# Patient Record
Sex: MALE | Race: WHITE | NOT HISPANIC OR LATINO | Employment: OTHER | URBAN - NONMETROPOLITAN AREA
[De-identification: names, ages, dates, MRNs, and addresses within clinical notes are randomized per-mention and may not be internally consistent; named-entity substitution may affect disease eponyms.]

---

## 2017-01-16 RX ORDER — PRAVASTATIN SODIUM 40 MG
TABLET ORAL
Qty: 90 TABLET | Refills: 0 | Status: SHIPPED | OUTPATIENT
Start: 2017-01-16 | End: 2017-03-13 | Stop reason: SDUPTHER

## 2017-02-27 RX ORDER — NAPROXEN 500 MG/1
TABLET ORAL
Qty: 90 TABLET | Refills: 0 | Status: SHIPPED | OUTPATIENT
Start: 2017-02-27 | End: 2017-06-05 | Stop reason: SDUPTHER

## 2017-03-08 ENCOUNTER — LAB (OUTPATIENT)
Dept: LAB | Facility: OTHER | Age: 77
End: 2017-03-08

## 2017-03-08 DIAGNOSIS — Z12.5 SCREENING FOR PROSTATE CANCER: ICD-10-CM

## 2017-03-08 DIAGNOSIS — I10 ESSENTIAL HYPERTENSION: ICD-10-CM

## 2017-03-08 DIAGNOSIS — E11.9 TYPE 2 DIABETES MELLITUS WITHOUT COMPLICATION (HCC): Chronic | ICD-10-CM

## 2017-03-08 DIAGNOSIS — E78.5 HYPERLIPIDEMIA: Chronic | ICD-10-CM

## 2017-03-08 DIAGNOSIS — E03.9 ACQUIRED HYPOTHYROIDISM: Chronic | ICD-10-CM

## 2017-03-08 LAB
ALBUMIN SERPL-MCNC: 4.1 G/DL (ref 3.2–5.5)
ALBUMIN/GLOB SERPL: 1.4 G/DL (ref 1–3)
ALP SERPL-CCNC: 74 U/L (ref 15–121)
ALT SERPL W P-5'-P-CCNC: 19 U/L (ref 10–60)
ANION GAP SERPL CALCULATED.3IONS-SCNC: 9 MMOL/L (ref 5–15)
AST SERPL-CCNC: 21 U/L (ref 10–60)
BASOPHILS # BLD AUTO: 0.04 10*3/MM3 (ref 0–0.2)
BASOPHILS NFR BLD AUTO: 0.4 % (ref 0–2)
BILIRUB SERPL-MCNC: 1.8 MG/DL (ref 0.2–1)
BILIRUB UR QL STRIP: NEGATIVE
BUN BLD-MCNC: 27 MG/DL (ref 8–25)
BUN/CREAT SERPL: 18 (ref 7–25)
CALCIUM SPEC-SCNC: 9.3 MG/DL (ref 8.4–10.8)
CHLORIDE SERPL-SCNC: 104 MMOL/L (ref 100–112)
CHOLEST SERPL-MCNC: 139 MG/DL (ref 150–200)
CLARITY UR: CLEAR
CO2 SERPL-SCNC: 26 MMOL/L (ref 20–32)
COLOR UR: YELLOW
CREAT BLD-MCNC: 1.5 MG/DL (ref 0.4–1.3)
DEPRECATED RDW RBC AUTO: 44.4 FL (ref 35.1–43.9)
EOSINOPHIL # BLD AUTO: 0.3 10*3/MM3 (ref 0–0.7)
EOSINOPHIL NFR BLD AUTO: 3.3 % (ref 0–7)
ERYTHROCYTE [DISTWIDTH] IN BLOOD BY AUTOMATED COUNT: 14.3 % (ref 11.5–14.5)
GFR SERPL CREATININE-BSD FRML MDRD: 46 ML/MIN/1.73 (ref 42–98)
GLOBULIN UR ELPH-MCNC: 3 GM/DL (ref 2.5–4.6)
GLUCOSE BLD-MCNC: 189 MG/DL (ref 70–100)
GLUCOSE UR STRIP-MCNC: NEGATIVE MG/DL
HCT VFR BLD AUTO: 42.4 % (ref 39–49)
HDLC SERPL-MCNC: 37 MG/DL (ref 35–100)
HGB BLD-MCNC: 14 G/DL (ref 13.7–17.3)
HGB UR QL STRIP.AUTO: NEGATIVE
KETONES UR QL STRIP: NEGATIVE
LDLC SERPL CALC-MCNC: 66 MG/DL
LDLC/HDLC SERPL: 1.78 {RATIO}
LEUKOCYTE ESTERASE UR QL STRIP.AUTO: NEGATIVE
LYMPHOCYTES # BLD AUTO: 1.79 10*3/MM3 (ref 0.6–4.2)
LYMPHOCYTES NFR BLD AUTO: 19.4 % (ref 10–50)
MCH RBC QN AUTO: 28.3 PG (ref 26.5–34)
MCHC RBC AUTO-ENTMCNC: 33 G/DL (ref 31.5–36.3)
MCV RBC AUTO: 85.7 FL (ref 80–98)
MONOCYTES # BLD AUTO: 1.21 10*3/MM3 (ref 0–0.9)
MONOCYTES NFR BLD AUTO: 13.1 % (ref 0–12)
NEUTROPHILS # BLD AUTO: 5.87 10*3/MM3 (ref 2–8.6)
NEUTROPHILS NFR BLD AUTO: 63.8 % (ref 37–80)
NITRITE UR QL STRIP: NEGATIVE
PH UR STRIP.AUTO: 5.5 [PH] (ref 5.5–8)
PLATELET # BLD AUTO: 248 10*3/MM3 (ref 150–450)
PMV BLD AUTO: 11.1 FL (ref 8–12)
POTASSIUM BLD-SCNC: 4.1 MMOL/L (ref 3.4–5.4)
PROT SERPL-MCNC: 7.1 G/DL (ref 6.7–8.2)
PROT UR QL STRIP: NEGATIVE
RBC # BLD AUTO: 4.95 10*6/MM3 (ref 4.37–5.74)
SODIUM BLD-SCNC: 139 MMOL/L (ref 134–146)
SP GR UR STRIP: 1.02 (ref 1–1.03)
TRIGL SERPL-MCNC: 180 MG/DL (ref 35–160)
UROBILINOGEN UR QL STRIP: NORMAL
VLDLC SERPL-MCNC: 36 MG/DL
WBC NRBC COR # BLD: 9.21 10*3/MM3 (ref 3.2–9.8)

## 2017-03-08 PROCEDURE — 84439 ASSAY OF FREE THYROXINE: CPT | Performed by: INTERNAL MEDICINE

## 2017-03-08 PROCEDURE — 80053 COMPREHEN METABOLIC PANEL: CPT | Performed by: INTERNAL MEDICINE

## 2017-03-08 PROCEDURE — 36415 COLL VENOUS BLD VENIPUNCTURE: CPT | Performed by: INTERNAL MEDICINE

## 2017-03-08 PROCEDURE — 85025 COMPLETE CBC W/AUTO DIFF WBC: CPT | Performed by: INTERNAL MEDICINE

## 2017-03-08 PROCEDURE — 80061 LIPID PANEL: CPT | Performed by: INTERNAL MEDICINE

## 2017-03-08 PROCEDURE — 84443 ASSAY THYROID STIM HORMONE: CPT | Performed by: INTERNAL MEDICINE

## 2017-03-08 PROCEDURE — G0103 PSA SCREENING: HCPCS | Performed by: INTERNAL MEDICINE

## 2017-03-08 PROCEDURE — 81003 URINALYSIS AUTO W/O SCOPE: CPT | Performed by: INTERNAL MEDICINE

## 2017-03-08 PROCEDURE — 82043 UR ALBUMIN QUANTITATIVE: CPT | Performed by: INTERNAL MEDICINE

## 2017-03-08 PROCEDURE — 83036 HEMOGLOBIN GLYCOSYLATED A1C: CPT | Performed by: INTERNAL MEDICINE

## 2017-03-09 LAB
ALBUMIN UR-MCNC: 1.3 MG/L
HBA1C MFR BLD: 7.26 % (ref 4–5.6)
PSA SERPL-MCNC: 2.26 NG/ML (ref 0–4)
T4 FREE SERPL-MCNC: 1.48 NG/DL (ref 0.78–2.19)
TSH SERPL DL<=0.05 MIU/L-ACNC: 3.84 MIU/ML (ref 0.46–4.68)

## 2017-03-13 ENCOUNTER — OFFICE VISIT (OUTPATIENT)
Dept: FAMILY MEDICINE CLINIC | Facility: CLINIC | Age: 77
End: 2017-03-13

## 2017-03-13 VITALS
BODY MASS INDEX: 29.8 KG/M2 | WEIGHT: 220 LBS | HEART RATE: 88 BPM | SYSTOLIC BLOOD PRESSURE: 134 MMHG | DIASTOLIC BLOOD PRESSURE: 70 MMHG | TEMPERATURE: 97 F | HEIGHT: 72 IN

## 2017-03-13 DIAGNOSIS — I10 ESSENTIAL HYPERTENSION: Chronic | ICD-10-CM

## 2017-03-13 DIAGNOSIS — E78.2 MIXED HYPERLIPIDEMIA: Chronic | ICD-10-CM

## 2017-03-13 DIAGNOSIS — H61.21 IMPACTED CERUMEN OF RIGHT EAR: ICD-10-CM

## 2017-03-13 DIAGNOSIS — E03.9 ACQUIRED HYPOTHYROIDISM: Chronic | ICD-10-CM

## 2017-03-13 DIAGNOSIS — E11.9 TYPE 2 DIABETES MELLITUS WITHOUT COMPLICATION, WITHOUT LONG-TERM CURRENT USE OF INSULIN (HCC): Primary | Chronic | ICD-10-CM

## 2017-03-13 PROCEDURE — 99214 OFFICE O/P EST MOD 30 MIN: CPT | Performed by: INTERNAL MEDICINE

## 2017-03-13 PROCEDURE — 69210 REMOVE IMPACTED EAR WAX UNI: CPT | Performed by: INTERNAL MEDICINE

## 2017-03-13 RX ORDER — ASPIRIN 81 MG/1
81 TABLET ORAL DAILY
COMMUNITY

## 2017-03-13 RX ORDER — PRAVASTATIN SODIUM 40 MG
40 TABLET ORAL NIGHTLY
Qty: 90 TABLET | Refills: 3 | Status: SHIPPED | OUTPATIENT
Start: 2017-03-13 | End: 2017-08-28 | Stop reason: SDUPTHER

## 2017-03-13 RX ORDER — CLOBETASOL PROPIONATE 0.5 MG/G
AEROSOL, FOAM TOPICAL
Refills: 5 | COMMUNITY
Start: 2016-12-31 | End: 2022-05-16 | Stop reason: ALTCHOICE

## 2017-03-13 NOTE — PATIENT INSTRUCTIONS

## 2017-03-13 NOTE — PROGRESS NOTES
Subjective   Darwin Fraga is a 77 y.o. male who presents to the office for follow-up and review of labs. He has diabetes and his blood sugar has been controlled, however He has noticed his blood sugar running a little higher over all past couple of months.  He is tolerating his medications without any side effects.  He has hypertension and his blood pressure has been controlled.  He takes Synthroid daily for treatment of hypothyroidism.  He has osteoarthritis and takes naproxen as needed.  He uses this sparingly secondary to renal insufficiency. He has a history of coronary artery disease and had a mild heart attack a number of years ago.  He follows with cardiology for this.  He has BPH and takes Flomax daily.  He denies any urinary symptoms.  He has GERD which is well controlled with Protonix.  He has a history of a GI bleed.    He complains of pressure and discomfort in the right ear.  This is usually present each morning upon awakening.  The ear then seems to open up after a few hours.  He has been using over-the-counter ear wax softener but has not noticed any change in the symptoms.      History of Present Illness     The following portions of the patient's history were reviewed and updated as appropriate: allergies, current medications, past family history, past medical history, past social history, past surgical history and problem list.    Review of Systems   Constitutional: Negative for chills, fatigue and fever.   HENT: Positive for ear pain. Negative for congestion, sneezing, sore throat and trouble swallowing.    Eyes: Negative for visual disturbance.   Respiratory: Negative for cough, chest tightness, shortness of breath and wheezing.    Cardiovascular: Negative for chest pain, palpitations and leg swelling.   Gastrointestinal: Negative for abdominal pain, constipation, diarrhea, nausea and vomiting.   Genitourinary: Negative for dysuria, frequency and urgency.   Musculoskeletal: Negative for neck  pain.   Skin: Negative for rash.   Neurological: Negative for dizziness, weakness and headaches.   Psychiatric/Behavioral:        Patient denies any feelings of depression and has not felt down, hopeless or lost interest in any activities.   All other systems reviewed and are negative.      Objective   Physical Exam   Constitutional: He is oriented to person, place, and time. He appears well-developed and well-nourished. No distress.   HENT:   Head: Normocephalic and atraumatic.   Nose: Nose normal.   Mouth/Throat: Oropharynx is clear and moist. No oropharyngeal exudate.   The right TM is occluded secondary to cerumen impaction   Eyes: Conjunctivae and EOM are normal. Pupils are equal, round, and reactive to light. No scleral icterus.   Neck: Normal range of motion. Neck supple.   Cardiovascular: Normal rate, regular rhythm and normal heart sounds.  Exam reveals no gallop and no friction rub.    No murmur heard.  Pulmonary/Chest: Effort normal and breath sounds normal. No respiratory distress. He has no wheezes. He has no rales.   Abdominal: Soft. Bowel sounds are normal. He exhibits no distension. There is no tenderness. There is no rebound and no guarding.   Musculoskeletal: Normal range of motion. He exhibits no edema.    Darwin had a diabetic foot exam performed today.   During the foot exam he had a monofilament test performed.    Neurological Sensory Findings - Unaltered hot/cold right ankle/foot discrimination and unaltered hot/cold left ankle/foot discrimination. Unaltered sharp/dull right ankle/foot discrimination and unaltered sharp/dull left ankle/foot discrimination. No right ankle/foot altered proprioception and no left ankle/foot altered proprioception    Vascular Status -  His exam exhibits right foot vasculature normal. His exam exhibits no right foot edema. His exam exhibits left foot vasculature normal. His exam exhibits no left foot edema.   Skin Integrity  -  His right foot skin is intact.      Darwin 's left foot skin is intact. .   Foot Structure and Biomechanics -  His right foot has no claw toes and no hammer toes present. His left foot has no claw toes and no hammer toes.      Lymphadenopathy:     He has no cervical adenopathy.   Neurological: He is alert and oriented to person, place, and time. No cranial nerve deficit.   Skin: Skin is warm and dry. No rash noted.   Psychiatric: He has a normal mood and affect. His behavior is normal. Judgment and thought content normal.   Nursing note and vitals reviewed.        Assessment/Plan   Darwin was seen today for diabetes, hypertension, hyperlipidemia and hypothyroidism.    Diagnoses and all orders for this visit:    Type 2 diabetes mellitus without complication, without long-term current use of insulin  -     Hemoglobin A1c; Future  -     Urinalysis With / Culture If Indicated; Future    Essential hypertension  -     CBC & Differential; Future  -     Comprehensive Metabolic Panel; Future    Mixed hyperlipidemia  -     LDL Cholesterol, Direct; Future  -     Triglycerides; Future  -     pravastatin (PRAVACHOL) 40 MG tablet; Take 1 tablet by mouth Every Night.    Acquired hypothyroidism  -     T4, Free; Future  -     TSH; Future    Impacted cerumen of right ear  -     Ear Cerumen Removal Instrumentation         Labs are reviewed with patient. His diabetes is controlled, although his hemoglobin A1c is a little higher than the previous level.  He may monitor blood sugar one time daily.  He will continue with his current diabetic medication.  He does not currently take an ACE inhibitor secondary to his creatinine.  His lipids are doing well and he will continue with pravastatin and niacin.  His TSH is normal so he will continue with the current dose of Synthroid.  His blood pressure is well-controlled.     Patients BMI indicates that he is overweight.  I discussed the importance of weight loss, and have recommended a diet and exercise regimen.    Ear Cerumen  Removal Instrumentation  Date/Time: 3/13/2017 11:15 AM  Performed by: RICK TOLEDO  Authorized by: RICK TOLEDO  Local anesthetic: none  Location details: right ear  Procedure type: curette and irrigation  Patient sedated: no  Patient tolerance: Patient tolerated the procedure well with no immediate complications  Comments: The cerumen impaction was removed.  The TM was then visualized and appeared normal.  The patient noticed improvement in his hearing, and some relief of pressure.          PHQ-9 Depression Screening 3/13/2017   Little interest or pleasure in doing things 0   Feeling down, depressed, or hopeless 0   Trouble falling or staying asleep, or sleeping too much 0   Feeling tired or having little energy 0   Poor appetite or overeating 0   Feeling bad about yourself - or that you are a failure or have let yourself or your family down 0   Trouble concentrating on things, such as reading the newspaper or watching television 0   Moving or speaking so slowly that other people could have noticed. Or the opposite - being so fidgety or restless that you have been moving around a lot more than usual 0   Thoughts that you would be better off dead, or of hurting yourself in some way 0   PHQ-9 Total Score 0         Lab on 03/08/2017   Component Date Value Ref Range Status   • Glucose 03/08/2017 189* 70 - 100 mg/dL Final   • BUN 03/08/2017 27* 8 - 25 mg/dL Final   • Creatinine 03/08/2017 1.50* 0.40 - 1.30 mg/dL Final   • Sodium 03/08/2017 139  134 - 146 mmol/L Final   • Potassium 03/08/2017 4.1  3.4 - 5.4 mmol/L Final   • Chloride 03/08/2017 104  100 - 112 mmol/L Final   • CO2 03/08/2017 26.0  20.0 - 32.0 mmol/L Final   • Calcium 03/08/2017 9.3  8.4 - 10.8 mg/dL Final   • Total Protein 03/08/2017 7.1  6.7 - 8.2 g/dL Final   • Albumin 03/08/2017 4.10  3.20 - 5.50 g/dL Final   • ALT (SGPT) 03/08/2017 19  10 - 60 U/L Final   • AST (SGOT) 03/08/2017 21  10 - 60 U/L Final   • Alkaline Phosphatase 03/08/2017 74  15 -  121 U/L Final   • Total Bilirubin 03/08/2017 1.8* 0.2 - 1.0 mg/dL Final   • eGFR Non African Amer 03/08/2017 46  42 - 98 mL/min/1.73 Final   • Globulin 03/08/2017 3.0  2.5 - 4.6 gm/dL Final   • A/G Ratio 03/08/2017 1.4  1.0 - 3.0 g/dL Final   • BUN/Creatinine Ratio 03/08/2017 18.0  7.0 - 25.0 Final   • Anion Gap 03/08/2017 9.0  5.0 - 15.0 mmol/L Final   • Hemoglobin A1C 03/08/2017 7.26* 4 - 5.6 % Final   • Total Cholesterol 03/08/2017 139* 150 - 200 mg/dL Final   • Triglycerides 03/08/2017 180* 35 - 160 mg/dL Final   • HDL Cholesterol 03/08/2017 37  35 - 100 mg/dL Final   • LDL Cholesterol  03/08/2017 66  mg/dL Final   • VLDL Cholesterol 03/08/2017 36  mg/dL Final   • LDL/HDL Ratio 03/08/2017 1.78   Final   • Microalbumin, Urine 03/08/2017 1.3  mg/L Final   • PSA 03/08/2017 2.260  0.000 - 4.000 ng/mL Final   • Free T4 03/08/2017 1.48  0.78 - 2.19 ng/dL Final   • TSH 03/08/2017 3.840  0.460 - 4.680 mIU/mL Final   • Color, UA 03/08/2017 Yellow  Yellow, Straw Final   • Appearance, UA 03/08/2017 Clear  Clear Final   • pH, UA 03/08/2017 5.5  5.5 - 8.0 Final   • Specific Gravity, UA 03/08/2017 1.025  1.005 - 1.030 Final   • Glucose, UA 03/08/2017 Negative  Negative Final   • Ketones, UA 03/08/2017 Negative  Negative Final   • Bilirubin, UA 03/08/2017 Negative  Negative Final   • Blood, UA 03/08/2017 Negative  Negative Final   • Protein, UA 03/08/2017 Negative  Negative Final   • Leuk Esterase, UA 03/08/2017 Negative  Negative Final   • Nitrite, UA 03/08/2017 Negative  Negative Final   • Urobilinogen, UA 03/08/2017 0.2 E.U./dL  0.2 - 1.0 E.U./dL Final   • WBC 03/08/2017 9.21  3.20 - 9.80 10*3/mm3 Final   • RBC 03/08/2017 4.95  4.37 - 5.74 10*6/mm3 Final   • Hemoglobin 03/08/2017 14.0  13.7 - 17.3 g/dL Final   • Hematocrit 03/08/2017 42.4  39.0 - 49.0 % Final   • MCV 03/08/2017 85.7  80.0 - 98.0 fL Final   • MCH 03/08/2017 28.3  26.5 - 34.0 pg Final   • MCHC 03/08/2017 33.0  31.5 - 36.3 g/dL Final   • RDW 03/08/2017 14.3   11.5 - 14.5 % Final   • RDW-SD 03/08/2017 44.4* 35.1 - 43.9 fl Final   • MPV 03/08/2017 11.1  8.0 - 12.0 fL Final   • Platelets 03/08/2017 248  150 - 450 10*3/mm3 Final   • Neutrophil % 03/08/2017 63.8  37.0 - 80.0 % Final   • Lymphocyte % 03/08/2017 19.4  10.0 - 50.0 % Final   • Monocyte % 03/08/2017 13.1* 0.0 - 12.0 % Final   • Eosinophil % 03/08/2017 3.3  0.0 - 7.0 % Final   • Basophil % 03/08/2017 0.4  0.0 - 2.0 % Final   • Neutrophils, Absolute 03/08/2017 5.87  2.00 - 8.60 10*3/mm3 Final   • Lymphocytes, Absolute 03/08/2017 1.79  0.60 - 4.20 10*3/mm3 Final   • Monocytes, Absolute 03/08/2017 1.21* 0.00 - 0.90 10*3/mm3 Final   • Eosinophils, Absolute 03/08/2017 0.30  0.00 - 0.70 10*3/mm3 Final   • Basophils, Absolute 03/08/2017 0.04  0.00 - 0.20 10*3/mm3 Final   ]

## 2017-06-05 RX ORDER — NAPROXEN 500 MG/1
500 TABLET ORAL DAILY PRN
Qty: 90 TABLET | Refills: 0 | Status: SHIPPED | OUTPATIENT
Start: 2017-06-05 | End: 2017-08-28 | Stop reason: SDUPTHER

## 2017-08-28 DIAGNOSIS — E03.9 ACQUIRED HYPOTHYROIDISM: Chronic | ICD-10-CM

## 2017-08-28 DIAGNOSIS — I25.10 CORONARY ARTERY DISEASE INVOLVING NATIVE CORONARY ARTERY OF NATIVE HEART WITHOUT ANGINA PECTORIS: Chronic | ICD-10-CM

## 2017-08-28 DIAGNOSIS — I10 ESSENTIAL HYPERTENSION: Chronic | ICD-10-CM

## 2017-08-28 DIAGNOSIS — E78.2 MIXED HYPERLIPIDEMIA: Chronic | ICD-10-CM

## 2017-08-28 RX ORDER — GLIMEPIRIDE 4 MG/1
4 TABLET ORAL 2 TIMES DAILY
Qty: 180 TABLET | Refills: 0 | Status: SHIPPED | OUTPATIENT
Start: 2017-08-28 | End: 2017-09-11 | Stop reason: SDUPTHER

## 2017-08-28 RX ORDER — PRAVASTATIN SODIUM 40 MG
40 TABLET ORAL NIGHTLY
Qty: 90 TABLET | Refills: 0 | Status: SHIPPED | OUTPATIENT
Start: 2017-08-28 | End: 2017-09-11 | Stop reason: SDUPTHER

## 2017-08-28 RX ORDER — LABETALOL 100 MG/1
100 TABLET, FILM COATED ORAL 2 TIMES DAILY
Qty: 180 TABLET | Refills: 0 | Status: SHIPPED | OUTPATIENT
Start: 2017-08-28 | End: 2017-09-11 | Stop reason: SDUPTHER

## 2017-08-28 RX ORDER — PIOGLITAZONEHYDROCHLORIDE 15 MG/1
15 TABLET ORAL DAILY
Qty: 90 TABLET | Refills: 0 | Status: SHIPPED | OUTPATIENT
Start: 2017-08-28 | End: 2017-09-11 | Stop reason: SDUPTHER

## 2017-08-28 RX ORDER — HYDROCHLOROTHIAZIDE 12.5 MG/1
12.5 CAPSULE, GELATIN COATED ORAL DAILY
Qty: 90 CAPSULE | Refills: 0 | Status: SHIPPED | OUTPATIENT
Start: 2017-08-28 | End: 2017-09-11 | Stop reason: SDUPTHER

## 2017-08-28 RX ORDER — LEVOTHYROXINE SODIUM 0.03 MG/1
25 TABLET ORAL DAILY
Qty: 90 TABLET | Refills: 0 | Status: SHIPPED | OUTPATIENT
Start: 2017-08-28 | End: 2017-09-11 | Stop reason: SDUPTHER

## 2017-08-28 RX ORDER — FOLIC ACID 1 MG/1
1 TABLET ORAL DAILY
Qty: 90 TABLET | Refills: 0 | Status: SHIPPED | OUTPATIENT
Start: 2017-08-28 | End: 2017-09-11 | Stop reason: SDUPTHER

## 2017-08-28 RX ORDER — NAPROXEN 500 MG/1
500 TABLET ORAL DAILY PRN
Qty: 90 TABLET | Refills: 0 | Status: SHIPPED | OUTPATIENT
Start: 2017-08-28 | End: 2018-02-04 | Stop reason: SDUPTHER

## 2017-08-28 RX ORDER — AMLODIPINE BESYLATE 10 MG/1
10 TABLET ORAL DAILY
Qty: 90 TABLET | Refills: 0 | Status: SHIPPED | OUTPATIENT
Start: 2017-08-28 | End: 2017-09-11 | Stop reason: SDUPTHER

## 2017-09-05 ENCOUNTER — LAB (OUTPATIENT)
Dept: LAB | Facility: OTHER | Age: 77
End: 2017-09-05

## 2017-09-05 DIAGNOSIS — E11.9 TYPE 2 DIABETES MELLITUS WITHOUT COMPLICATION, WITHOUT LONG-TERM CURRENT USE OF INSULIN (HCC): Chronic | ICD-10-CM

## 2017-09-05 DIAGNOSIS — I10 ESSENTIAL HYPERTENSION: ICD-10-CM

## 2017-09-05 DIAGNOSIS — E78.2 MIXED HYPERLIPIDEMIA: Chronic | ICD-10-CM

## 2017-09-05 DIAGNOSIS — E03.9 ACQUIRED HYPOTHYROIDISM: Chronic | ICD-10-CM

## 2017-09-05 LAB
ALBUMIN SERPL-MCNC: 4.1 G/DL (ref 3.2–5.5)
ALBUMIN/GLOB SERPL: 1.4 G/DL (ref 1–3)
ALP SERPL-CCNC: 64 U/L (ref 15–121)
ALT SERPL W P-5'-P-CCNC: 18 U/L (ref 10–60)
ANION GAP SERPL CALCULATED.3IONS-SCNC: 12 MMOL/L (ref 5–15)
ARTICHOKE IGE QN: 80 MG/DL (ref 0–129)
AST SERPL-CCNC: 22 U/L (ref 10–60)
BASOPHILS # BLD AUTO: 0.03 10*3/MM3 (ref 0–0.2)
BASOPHILS NFR BLD AUTO: 0.2 % (ref 0–2)
BILIRUB SERPL-MCNC: 1.8 MG/DL (ref 0.2–1)
BILIRUB UR QL STRIP: NEGATIVE
BUN BLD-MCNC: 32 MG/DL (ref 8–25)
BUN/CREAT SERPL: 21.3 (ref 7–25)
CALCIUM SPEC-SCNC: 9 MG/DL (ref 8.4–10.8)
CHLORIDE SERPL-SCNC: 101 MMOL/L (ref 100–112)
CLARITY UR: CLEAR
CO2 SERPL-SCNC: 25 MMOL/L (ref 20–32)
COLOR UR: YELLOW
CREAT BLD-MCNC: 1.5 MG/DL (ref 0.4–1.3)
DEPRECATED RDW RBC AUTO: 43.9 FL (ref 35.1–43.9)
EOSINOPHIL # BLD AUTO: 0.34 10*3/MM3 (ref 0–0.7)
EOSINOPHIL NFR BLD AUTO: 2.2 % (ref 0–7)
ERYTHROCYTE [DISTWIDTH] IN BLOOD BY AUTOMATED COUNT: 13.8 % (ref 11.5–14.5)
GFR SERPL CREATININE-BSD FRML MDRD: 46 ML/MIN/1.73 (ref 42–98)
GLOBULIN UR ELPH-MCNC: 3 GM/DL (ref 2.5–4.6)
GLUCOSE BLD-MCNC: 174 MG/DL (ref 70–100)
GLUCOSE UR STRIP-MCNC: NEGATIVE MG/DL
HBA1C MFR BLD: 6.9 % (ref 4–5.6)
HCT VFR BLD AUTO: 42.1 % (ref 39–49)
HGB BLD-MCNC: 13.9 G/DL (ref 13.7–17.3)
HGB UR QL STRIP.AUTO: NEGATIVE
KETONES UR QL STRIP: NEGATIVE
LEUKOCYTE ESTERASE UR QL STRIP.AUTO: NEGATIVE
LYMPHOCYTES # BLD AUTO: 2.12 10*3/MM3 (ref 0.6–4.2)
LYMPHOCYTES NFR BLD AUTO: 13.8 % (ref 10–50)
MCH RBC QN AUTO: 29.3 PG (ref 26.5–34)
MCHC RBC AUTO-ENTMCNC: 33 G/DL (ref 31.5–36.3)
MCV RBC AUTO: 88.6 FL (ref 80–98)
MONOCYTES # BLD AUTO: 1.9 10*3/MM3 (ref 0–0.9)
MONOCYTES NFR BLD AUTO: 12.4 % (ref 0–12)
NEUTROPHILS # BLD AUTO: 10.98 10*3/MM3 (ref 2–8.6)
NEUTROPHILS NFR BLD AUTO: 71.4 % (ref 37–80)
NITRITE UR QL STRIP: NEGATIVE
PH UR STRIP.AUTO: 5.5 [PH] (ref 5.5–8)
PLATELET # BLD AUTO: 204 10*3/MM3 (ref 150–450)
PMV BLD AUTO: 10.9 FL (ref 8–12)
POTASSIUM BLD-SCNC: 4.1 MMOL/L (ref 3.4–5.4)
PROT SERPL-MCNC: 7.1 G/DL (ref 6.7–8.2)
PROT UR QL STRIP: NEGATIVE
RBC # BLD AUTO: 4.75 10*6/MM3 (ref 4.37–5.74)
SODIUM BLD-SCNC: 138 MMOL/L (ref 134–146)
SP GR UR STRIP: 1.02 (ref 1–1.03)
T4 FREE SERPL-MCNC: 1.3 NG/DL (ref 0.78–2.19)
TRIGL SERPL-MCNC: 156 MG/DL (ref 35–160)
TSH SERPL DL<=0.05 MIU/L-ACNC: 3.84 MIU/ML (ref 0.46–4.68)
UROBILINOGEN UR QL STRIP: NORMAL
WBC NRBC COR # BLD: 15.37 10*3/MM3 (ref 3.2–9.8)

## 2017-09-05 PROCEDURE — 36415 COLL VENOUS BLD VENIPUNCTURE: CPT | Performed by: INTERNAL MEDICINE

## 2017-09-05 PROCEDURE — 84478 ASSAY OF TRIGLYCERIDES: CPT | Performed by: INTERNAL MEDICINE

## 2017-09-05 PROCEDURE — 83721 ASSAY OF BLOOD LIPOPROTEIN: CPT | Performed by: INTERNAL MEDICINE

## 2017-09-05 PROCEDURE — 80053 COMPREHEN METABOLIC PANEL: CPT | Performed by: INTERNAL MEDICINE

## 2017-09-05 PROCEDURE — 81003 URINALYSIS AUTO W/O SCOPE: CPT | Performed by: INTERNAL MEDICINE

## 2017-09-05 PROCEDURE — 84443 ASSAY THYROID STIM HORMONE: CPT | Performed by: INTERNAL MEDICINE

## 2017-09-05 PROCEDURE — 84439 ASSAY OF FREE THYROXINE: CPT | Performed by: INTERNAL MEDICINE

## 2017-09-05 PROCEDURE — 85025 COMPLETE CBC W/AUTO DIFF WBC: CPT | Performed by: INTERNAL MEDICINE

## 2017-09-05 PROCEDURE — 83036 HEMOGLOBIN GLYCOSYLATED A1C: CPT | Performed by: INTERNAL MEDICINE

## 2017-09-11 ENCOUNTER — OFFICE VISIT (OUTPATIENT)
Dept: FAMILY MEDICINE CLINIC | Facility: CLINIC | Age: 77
End: 2017-09-11

## 2017-09-11 VITALS
SYSTOLIC BLOOD PRESSURE: 130 MMHG | HEART RATE: 70 BPM | WEIGHT: 205 LBS | HEIGHT: 72 IN | BODY MASS INDEX: 27.77 KG/M2 | DIASTOLIC BLOOD PRESSURE: 60 MMHG

## 2017-09-11 DIAGNOSIS — IMO0001 NO DIABETIC RETINOPATHY IN BOTH EYES: ICD-10-CM

## 2017-09-11 DIAGNOSIS — I25.10 CORONARY ARTERY DISEASE INVOLVING NATIVE CORONARY ARTERY OF NATIVE HEART WITHOUT ANGINA PECTORIS: Chronic | ICD-10-CM

## 2017-09-11 DIAGNOSIS — E03.9 ACQUIRED HYPOTHYROIDISM: Chronic | ICD-10-CM

## 2017-09-11 DIAGNOSIS — N18.2 CHRONIC RENAL IMPAIRMENT, STAGE 2 (MILD): Chronic | ICD-10-CM

## 2017-09-11 DIAGNOSIS — K21.9 GASTROESOPHAGEAL REFLUX DISEASE WITHOUT ESOPHAGITIS: Chronic | ICD-10-CM

## 2017-09-11 DIAGNOSIS — I10 ESSENTIAL HYPERTENSION: Chronic | ICD-10-CM

## 2017-09-11 DIAGNOSIS — E78.2 MIXED HYPERLIPIDEMIA: Chronic | ICD-10-CM

## 2017-09-11 DIAGNOSIS — M15.9 PRIMARY OSTEOARTHRITIS INVOLVING MULTIPLE JOINTS: Chronic | ICD-10-CM

## 2017-09-11 DIAGNOSIS — E11.9 TYPE 2 DIABETES MELLITUS WITHOUT COMPLICATION, WITHOUT LONG-TERM CURRENT USE OF INSULIN (HCC): Primary | Chronic | ICD-10-CM

## 2017-09-11 DIAGNOSIS — N40.1 BENIGN NON-NODULAR PROSTATIC HYPERPLASIA WITH LOWER URINARY TRACT SYMPTOMS: Chronic | ICD-10-CM

## 2017-09-11 DIAGNOSIS — Z12.5 SCREENING FOR PROSTATE CANCER: ICD-10-CM

## 2017-09-11 PROBLEM — H35.30 MACULAR DEGENERATION OF BOTH EYES: Chronic | Status: ACTIVE | Noted: 2017-09-11

## 2017-09-11 PROCEDURE — 99214 OFFICE O/P EST MOD 30 MIN: CPT | Performed by: INTERNAL MEDICINE

## 2017-09-11 RX ORDER — NIACIN 500 MG/1
500 TABLET, EXTENDED RELEASE ORAL NIGHTLY
Qty: 90 TABLET | Refills: 3 | Status: SHIPPED | OUTPATIENT
Start: 2017-09-11 | End: 2018-08-27 | Stop reason: SDUPTHER

## 2017-09-11 RX ORDER — PIOGLITAZONEHYDROCHLORIDE 15 MG/1
15 TABLET ORAL DAILY
Qty: 90 TABLET | Refills: 3 | Status: SHIPPED | OUTPATIENT
Start: 2017-09-11 | End: 2018-03-12 | Stop reason: SDUPTHER

## 2017-09-11 RX ORDER — MV-MIN/FA/VIT K/LUTEIN/ZEAXANT 200MCG-5MG
1 CAPSULE ORAL 2 TIMES DAILY
COMMUNITY

## 2017-09-11 RX ORDER — LABETALOL 100 MG/1
100 TABLET, FILM COATED ORAL 2 TIMES DAILY
Qty: 180 TABLET | Refills: 3 | Status: SHIPPED | OUTPATIENT
Start: 2017-09-11 | End: 2018-09-13 | Stop reason: SDUPTHER

## 2017-09-11 RX ORDER — TAMSULOSIN HYDROCHLORIDE 0.4 MG/1
1 CAPSULE ORAL DAILY
Qty: 90 CAPSULE | Refills: 3 | Status: SHIPPED | OUTPATIENT
Start: 2017-09-11 | End: 2018-09-07 | Stop reason: SDUPTHER

## 2017-09-11 RX ORDER — AMLODIPINE BESYLATE 10 MG/1
10 TABLET ORAL DAILY
Qty: 90 TABLET | Refills: 3 | Status: SHIPPED | OUTPATIENT
Start: 2017-09-11 | End: 2018-09-13 | Stop reason: SDUPTHER

## 2017-09-11 RX ORDER — PANTOPRAZOLE SODIUM 40 MG/1
40 TABLET, DELAYED RELEASE ORAL DAILY
Qty: 90 TABLET | Refills: 3 | Status: SHIPPED | OUTPATIENT
Start: 2017-09-11 | End: 2018-09-05 | Stop reason: SDUPTHER

## 2017-09-11 RX ORDER — HYDROCHLOROTHIAZIDE 12.5 MG/1
12.5 CAPSULE, GELATIN COATED ORAL DAILY
Qty: 90 CAPSULE | Refills: 3 | Status: SHIPPED | OUTPATIENT
Start: 2017-09-11 | End: 2018-09-13 | Stop reason: SDUPTHER

## 2017-09-11 RX ORDER — FOLIC ACID 1 MG/1
1 TABLET ORAL DAILY
Qty: 90 TABLET | Refills: 3 | Status: SHIPPED | OUTPATIENT
Start: 2017-09-11 | End: 2018-09-13 | Stop reason: SDUPTHER

## 2017-09-11 RX ORDER — PRAVASTATIN SODIUM 40 MG
40 TABLET ORAL NIGHTLY
Qty: 90 TABLET | Refills: 3 | Status: SHIPPED | OUTPATIENT
Start: 2017-09-11 | End: 2018-09-05 | Stop reason: SDUPTHER

## 2017-09-11 RX ORDER — SITAGLIPTIN AND METFORMIN HYDROCHLORIDE 500; 50 MG/1; MG/1
1 TABLET, FILM COATED ORAL 2 TIMES DAILY
Qty: 180 TABLET | Refills: 3 | Status: SHIPPED | OUTPATIENT
Start: 2017-09-11 | End: 2018-09-13 | Stop reason: SDUPTHER

## 2017-09-11 RX ORDER — ASPIRIN 81 MG
100 TABLET, DELAYED RELEASE (ENTERIC COATED) ORAL DAILY
COMMUNITY

## 2017-09-11 RX ORDER — GLIMEPIRIDE 4 MG/1
4 TABLET ORAL 2 TIMES DAILY
Qty: 180 TABLET | Refills: 3 | Status: SHIPPED | OUTPATIENT
Start: 2017-09-11 | End: 2018-09-13 | Stop reason: SDUPTHER

## 2017-09-11 RX ORDER — LEVOTHYROXINE SODIUM 0.03 MG/1
25 TABLET ORAL DAILY
Qty: 90 TABLET | Refills: 0 | Status: SHIPPED | OUTPATIENT
Start: 2017-09-11 | End: 2018-03-12 | Stop reason: SDUPTHER

## 2017-09-11 NOTE — PROGRESS NOTES
Chief Complaint   Patient presents with   • Diabetes   • Hypothyroidism   • Hypertension   • Hyperlipidemia     Subjective   Darwin Fraga is a 77 y.o. male who presents to the office for follow-up and review of labs. He has diabetes and his blood sugar has been controlled. He is tolerating his medications without any side effects.  He has hypertension and his blood pressure has been controlled.  He takes Synthroid daily for treatment of hypothyroidism.  He has osteoarthritis and takes naproxen as needed.  He uses this sparingly secondary to renal insufficiency. He has a history of coronary artery disease and had a mild heart attack a number of years ago.  He follows with cardiology for this.  He has BPH and takes Flomax daily.  He denies any urinary symptoms.  He has GERD which is well controlled with Protonix.  He has a history of a GI bleed.    He has recently been having increased pain in his left hip.  At times this becomes so severe that he has difficulty walking.  He has an appointment with orthopedics later this week.  He also had an eye exam a couple of weeks ago and was diagnosed with early macular degeneration.  He is starting to notice some decreased vision at nighttime.    The following portions of the patient's history were reviewed and updated as appropriate: allergies, current medications, past family history, past medical history, past social history, past surgical history and problem list.    Review of Systems   Constitutional: Negative for chills, fatigue and fever.   HENT: Negative for congestion, ear pain, sneezing, sore throat and trouble swallowing.    Eyes: Positive for visual disturbance.   Respiratory: Negative for cough, chest tightness, shortness of breath and wheezing.    Cardiovascular: Negative for chest pain, palpitations and leg swelling.   Gastrointestinal: Negative for abdominal pain, constipation, diarrhea, nausea and vomiting.   Genitourinary: Negative for dysuria, frequency  "and urgency.   Musculoskeletal: Positive for arthralgias. Negative for neck pain.   Skin: Negative for rash.   Neurological: Negative for dizziness, weakness and headaches.   Psychiatric/Behavioral:        Patient denies any feelings of depression and has not felt down, hopeless or lost interest in any activities.   All other systems reviewed and are negative.      Objective   Vitals:    09/11/17 0814   BP: 130/60   BP Location: Left arm   Patient Position: Sitting   Cuff Size: Adult   Pulse: 70   Weight: 205 lb (93 kg)   Height: 72\" (182.9 cm)   PainSc: 0-No pain  Comment: left hip pain with weight bearing   PainLoc: Hip     Physical Exam   Constitutional: He is oriented to person, place, and time. He appears well-developed and well-nourished. No distress.   HENT:   Head: Normocephalic and atraumatic.   Nose: Nose normal.   Mouth/Throat: Oropharynx is clear and moist. No oropharyngeal exudate.   Eyes: Conjunctivae and EOM are normal. Pupils are equal, round, and reactive to light. No scleral icterus.   Neck: Normal range of motion. Neck supple.   Cardiovascular: Normal rate, regular rhythm and normal heart sounds.  Exam reveals no gallop and no friction rub.    No murmur heard.  Pulmonary/Chest: Effort normal and breath sounds normal. No respiratory distress. He has no wheezes. He has no rales.   Abdominal: Soft. Bowel sounds are normal. He exhibits no distension. There is no tenderness. There is no rebound and no guarding.   Musculoskeletal: Normal range of motion. He exhibits no edema.   Lymphadenopathy:     He has no cervical adenopathy.   Neurological: He is alert and oriented to person, place, and time. No cranial nerve deficit.   Skin: Skin is warm and dry. No rash noted.   Psychiatric: He has a normal mood and affect. His behavior is normal. Judgment and thought content normal.   Nursing note and vitals reviewed.      Assessment/Plan   Darwin was seen today for diabetes, hypothyroidism, hypertension and " hyperlipidemia.    Diagnoses and all orders for this visit:    Type 2 diabetes mellitus without complication, without long-term current use of insulin  -     Hemoglobin A1c; Future  -     Microalbumin / Creatinine Urine Ratio; Future  -     glimepiride (AMARYL) 4 MG tablet; Take 1 tablet by mouth 2 (Two) Times a Day.  -     JANUMET  MG per tablet; Take 1 tablet by mouth 2 (Two) Times a Day.  -     pioglitazone (ACTOS) 15 MG tablet; Take 1 tablet by mouth Daily.    Essential hypertension  -     CBC & Differential; Future  -     Comprehensive Metabolic Panel; Future  -     Urinalysis With / Culture If Indicated; Future  -     amLODIPine (NORVASC) 10 MG tablet; Take 1 tablet by mouth Daily.  -     hydrochlorothiazide (MICROZIDE) 12.5 MG capsule; Take 1 capsule by mouth Daily.  -     labetalol (NORMODYNE) 100 MG tablet; Take 1 tablet by mouth 2 (Two) Times a Day.    Mixed hyperlipidemia  -     Lipid Panel; Future  -     niacin (NIASPAN) 500 MG CR tablet; Take 1 tablet by mouth Every Night.  -     pravastatin (PRAVACHOL) 40 MG tablet; Take 1 tablet by mouth Every Night.    Acquired hypothyroidism  -     T4, Free; Future  -     TSH; Future  -     levothyroxine (SYNTHROID, LEVOTHROID) 25 MCG tablet; Take 1 tablet by mouth Daily.    Primary osteoarthritis involving multiple joints  Comments:  Severe Left Hip Pain at times, has ortho appt on Wednesday.    Gastroesophageal reflux disease without esophagitis  -     pantoprazole (PROTONIX) 40 MG EC tablet; Take 1 tablet by mouth Daily.    Benign non-nodular prostatic hyperplasia with lower urinary tract symptoms  -     tamsulosin (FLOMAX) 0.4 MG capsule 24 hr capsule; Take 1 capsule by mouth Daily.    Coronary artery disease involving native coronary artery of native heart without angina pectoris  -     folic acid (FOLVITE) 1 MG tablet; Take 1 tablet by mouth Daily.    Chronic renal impairment, stage 2 (mild)    Screening for prostate cancer  -     PSA Screen;  Future         Labs are reviewed with patient. His diabetes is controlled, and his hemoglobin A1c has improved compared to the previous level.  He may monitor blood sugar one time daily.  He will continue with his current diabetic medication.  He does not currently take an ACE inhibitor secondary to his creatinine.  His lipids are doing well and he will continue with pravastatin and niacin.  His TSH is normal so he will continue with the current dose of Synthroid.  His blood pressure is well-controlled.     He will follow-up with orthopedics as scheduled for further workup of the left hip pain.  He is due for a flu shot, but receives this at work each year.  He will let me know once he has received it.    He had a diabetic eye exam in May which showed no retinopathy.    PHQ-2/PHQ-9 Depression Screening 9/11/2017   Little interest or pleasure in doing things 0   Feeling down, depressed, or hopeless 0   Trouble falling or staying asleep, or sleeping too much 0   Feeling tired or having little energy 0   Poor appetite or overeating 0   Feeling bad about yourself - or that you are a failure or have let yourself or your family down 0   Trouble concentrating on things, such as reading the newspaper or watching television 0   Moving or speaking so slowly that other people could have noticed. Or the opposite - being so fidgety or restless that you have been moving around a lot more than usual 0   Thoughts that you would be better off dead, or of hurting yourself in some way 0   Total Score 0         Lab on 09/05/2017   Component Date Value Ref Range Status   • Glucose 09/05/2017 174* 70 - 100 mg/dL Final   • BUN 09/05/2017 32* 8 - 25 mg/dL Final   • Creatinine 09/05/2017 1.50* 0.40 - 1.30 mg/dL Final   • Sodium 09/05/2017 138  134 - 146 mmol/L Final   • Potassium 09/05/2017 4.1  3.4 - 5.4 mmol/L Final   • Chloride 09/05/2017 101  100 - 112 mmol/L Final   • CO2 09/05/2017 25.0  20.0 - 32.0 mmol/L Final   • Calcium 09/05/2017  9.0  8.4 - 10.8 mg/dL Final   • Total Protein 09/05/2017 7.1  6.7 - 8.2 g/dL Final   • Albumin 09/05/2017 4.10  3.20 - 5.50 g/dL Final   • ALT (SGPT) 09/05/2017 18  10 - 60 U/L Final   • AST (SGOT) 09/05/2017 22  10 - 60 U/L Final   • Alkaline Phosphatase 09/05/2017 64  15 - 121 U/L Final   • Total Bilirubin 09/05/2017 1.8* 0.2 - 1.0 mg/dL Final   • eGFR Non African Amer 09/05/2017 46  42 - 98 mL/min/1.73 Final   • Globulin 09/05/2017 3.0  2.5 - 4.6 gm/dL Final   • A/G Ratio 09/05/2017 1.4  1.0 - 3.0 g/dL Final   • BUN/Creatinine Ratio 09/05/2017 21.3  7.0 - 25.0 Final   • Anion Gap 09/05/2017 12.0  5.0 - 15.0 mmol/L Final   • Hemoglobin A1C 09/05/2017 6.9* 4 - 5.6 % Final   • LDL Cholesterol  09/05/2017 80  0 - 129 mg/dL Final   • Free T4 09/05/2017 1.30  0.78 - 2.19 ng/dL Final   • Triglycerides 09/05/2017 156  35 - 160 mg/dL Final   • TSH 09/05/2017 3.840  0.460 - 4.680 mIU/mL Final   • Color, UA 09/05/2017 Yellow  Yellow, Straw Final   • Appearance, UA 09/05/2017 Clear  Clear Final   • pH, UA 09/05/2017 5.5  5.5 - 8.0 Final   • Specific Gravity, UA 09/05/2017 1.020  1.005 - 1.030 Final   • Glucose, UA 09/05/2017 Negative  Negative Final   • Ketones, UA 09/05/2017 Negative  Negative Final   • Bilirubin, UA 09/05/2017 Negative  Negative Final   • Blood, UA 09/05/2017 Negative  Negative Final   • Protein, UA 09/05/2017 Negative  Negative Final   • Leuk Esterase, UA 09/05/2017 Negative  Negative Final   • Nitrite, UA 09/05/2017 Negative  Negative Final   • Urobilinogen, UA 09/05/2017 1.0 E.U./dL  0.2 - 1.0 E.U./dL Final   • WBC 09/05/2017 15.37* 3.20 - 9.80 10*3/mm3 Final   • RBC 09/05/2017 4.75  4.37 - 5.74 10*6/mm3 Final   • Hemoglobin 09/05/2017 13.9  13.7 - 17.3 g/dL Final   • Hematocrit 09/05/2017 42.1  39.0 - 49.0 % Final   • MCV 09/05/2017 88.6  80.0 - 98.0 fL Final   • MCH 09/05/2017 29.3  26.5 - 34.0 pg Final   • MCHC 09/05/2017 33.0  31.5 - 36.3 g/dL Final   • RDW 09/05/2017 13.8  11.5 - 14.5 % Final   •  RDW-SD 09/05/2017 43.9  35.1 - 43.9 fl Final   • MPV 09/05/2017 10.9  8.0 - 12.0 fL Final   • Platelets 09/05/2017 204  150 - 450 10*3/mm3 Final   • Neutrophil % 09/05/2017 71.4  37.0 - 80.0 % Final   • Lymphocyte % 09/05/2017 13.8  10.0 - 50.0 % Final   • Monocyte % 09/05/2017 12.4* 0.0 - 12.0 % Final   • Eosinophil % 09/05/2017 2.2  0.0 - 7.0 % Final   • Basophil % 09/05/2017 0.2  0.0 - 2.0 % Final   • Neutrophils, Absolute 09/05/2017 10.98* 2.00 - 8.60 10*3/mm3 Final   • Lymphocytes, Absolute 09/05/2017 2.12  0.60 - 4.20 10*3/mm3 Final   • Monocytes, Absolute 09/05/2017 1.90* 0.00 - 0.90 10*3/mm3 Final   • Eosinophils, Absolute 09/05/2017 0.34  0.00 - 0.70 10*3/mm3 Final   • Basophils, Absolute 09/05/2017 0.03  0.00 - 0.20 10*3/mm3 Final   ]

## 2018-01-22 PROBLEM — IMO0001 NO DIABETIC RETINOPATHY IN BOTH EYES: Status: ACTIVE | Noted: 2018-01-22

## 2018-02-05 RX ORDER — NAPROXEN 500 MG/1
TABLET ORAL
Qty: 90 TABLET | Refills: 0 | Status: SHIPPED | OUTPATIENT
Start: 2018-02-05 | End: 2018-03-12 | Stop reason: SDUPTHER

## 2018-03-08 ENCOUNTER — LAB (OUTPATIENT)
Dept: LAB | Facility: OTHER | Age: 78
End: 2018-03-08

## 2018-03-08 DIAGNOSIS — I10 ESSENTIAL HYPERTENSION: ICD-10-CM

## 2018-03-08 DIAGNOSIS — Z12.5 SCREENING FOR PROSTATE CANCER: ICD-10-CM

## 2018-03-08 DIAGNOSIS — E11.9 TYPE 2 DIABETES MELLITUS WITHOUT COMPLICATION, WITHOUT LONG-TERM CURRENT USE OF INSULIN (HCC): Chronic | ICD-10-CM

## 2018-03-08 DIAGNOSIS — E78.2 MIXED HYPERLIPIDEMIA: Chronic | ICD-10-CM

## 2018-03-08 DIAGNOSIS — E03.9 ACQUIRED HYPOTHYROIDISM: Chronic | ICD-10-CM

## 2018-03-08 LAB
ALBUMIN SERPL-MCNC: 4.2 G/DL (ref 3.2–5.5)
ALBUMIN UR-MCNC: 1.5 MG/L
ALBUMIN/GLOB SERPL: 1.4 G/DL (ref 1–3)
ALP SERPL-CCNC: 70 U/L (ref 15–121)
ALT SERPL W P-5'-P-CCNC: 21 U/L (ref 10–60)
ANION GAP SERPL CALCULATED.3IONS-SCNC: 9 MMOL/L (ref 5–15)
AST SERPL-CCNC: 22 U/L (ref 10–60)
BASOPHILS # BLD AUTO: 0.03 10*3/MM3 (ref 0–0.2)
BASOPHILS NFR BLD AUTO: 0.3 % (ref 0–2)
BILIRUB SERPL-MCNC: 1.2 MG/DL (ref 0.2–1)
BILIRUB UR QL STRIP: NEGATIVE
BUN BLD-MCNC: 29 MG/DL (ref 8–25)
BUN/CREAT SERPL: 18.1 (ref 7–25)
CALCIUM SPEC-SCNC: 9.5 MG/DL (ref 8.4–10.8)
CHLORIDE SERPL-SCNC: 101 MMOL/L (ref 100–112)
CHOLEST SERPL-MCNC: 163 MG/DL (ref 150–200)
CLARITY UR: ABNORMAL
CO2 SERPL-SCNC: 28 MMOL/L (ref 20–32)
COLOR UR: YELLOW
CREAT BLD-MCNC: 1.6 MG/DL (ref 0.4–1.3)
CREAT UR-MCNC: 242.9 MG/DL
DEPRECATED RDW RBC AUTO: 44.2 FL (ref 35.1–43.9)
EOSINOPHIL # BLD AUTO: 0.34 10*3/MM3 (ref 0–0.7)
EOSINOPHIL NFR BLD AUTO: 3.5 % (ref 0–7)
ERYTHROCYTE [DISTWIDTH] IN BLOOD BY AUTOMATED COUNT: 13.9 % (ref 11.5–14.5)
GFR SERPL CREATININE-BSD FRML MDRD: 42 ML/MIN/1.73 (ref 42–98)
GLOBULIN UR ELPH-MCNC: 2.9 GM/DL (ref 2.5–4.6)
GLUCOSE BLD-MCNC: 173 MG/DL (ref 70–100)
GLUCOSE UR STRIP-MCNC: NEGATIVE MG/DL
HBA1C MFR BLD: 7 % (ref 4–5.6)
HCT VFR BLD AUTO: 43.9 % (ref 39–49)
HDLC SERPL-MCNC: 38 MG/DL (ref 35–100)
HGB BLD-MCNC: 14.5 G/DL (ref 13.7–17.3)
HGB UR QL STRIP.AUTO: NEGATIVE
KETONES UR QL STRIP: ABNORMAL
LDLC SERPL CALC-MCNC: 96 MG/DL
LDLC/HDLC SERPL: 2.53 {RATIO}
LEUKOCYTE ESTERASE UR QL STRIP.AUTO: NEGATIVE
LYMPHOCYTES # BLD AUTO: 2.28 10*3/MM3 (ref 0.6–4.2)
LYMPHOCYTES NFR BLD AUTO: 23.3 % (ref 10–50)
MCH RBC QN AUTO: 29.2 PG (ref 26.5–34)
MCHC RBC AUTO-ENTMCNC: 33 G/DL (ref 31.5–36.3)
MCV RBC AUTO: 88.5 FL (ref 80–98)
MICROALBUMIN/CREAT UR: 6.2 MG/G (ref 0–30)
MONOCYTES # BLD AUTO: 1.25 10*3/MM3 (ref 0–0.9)
MONOCYTES NFR BLD AUTO: 12.8 % (ref 0–12)
NEUTROPHILS # BLD AUTO: 5.89 10*3/MM3 (ref 2–8.6)
NEUTROPHILS NFR BLD AUTO: 60.1 % (ref 37–80)
NITRITE UR QL STRIP: NEGATIVE
PH UR STRIP.AUTO: 5.5 [PH] (ref 5.5–8)
PLATELET # BLD AUTO: 219 10*3/MM3 (ref 150–450)
PMV BLD AUTO: 10.7 FL (ref 8–12)
POTASSIUM BLD-SCNC: 3.9 MMOL/L (ref 3.4–5.4)
PROT SERPL-MCNC: 7.1 G/DL (ref 6.7–8.2)
PROT UR QL STRIP: NEGATIVE
PSA SERPL-MCNC: 2.19 NG/ML (ref 0–4)
RBC # BLD AUTO: 4.96 10*6/MM3 (ref 4.37–5.74)
SODIUM BLD-SCNC: 138 MMOL/L (ref 134–146)
SP GR UR STRIP: 1.02 (ref 1–1.03)
T4 FREE SERPL-MCNC: 1.37 NG/DL (ref 0.78–2.19)
TRIGL SERPL-MCNC: 145 MG/DL (ref 35–160)
TSH SERPL DL<=0.05 MIU/L-ACNC: 3.5 MIU/ML (ref 0.46–4.68)
UROBILINOGEN UR QL STRIP: ABNORMAL
VLDLC SERPL-MCNC: 29 MG/DL
WBC NRBC COR # BLD: 9.79 10*3/MM3 (ref 3.2–9.8)

## 2018-03-08 PROCEDURE — 81003 URINALYSIS AUTO W/O SCOPE: CPT | Performed by: INTERNAL MEDICINE

## 2018-03-08 PROCEDURE — 85025 COMPLETE CBC W/AUTO DIFF WBC: CPT | Performed by: INTERNAL MEDICINE

## 2018-03-08 PROCEDURE — 36415 COLL VENOUS BLD VENIPUNCTURE: CPT | Performed by: INTERNAL MEDICINE

## 2018-03-08 PROCEDURE — 83036 HEMOGLOBIN GLYCOSYLATED A1C: CPT | Performed by: INTERNAL MEDICINE

## 2018-03-08 PROCEDURE — 80053 COMPREHEN METABOLIC PANEL: CPT | Performed by: INTERNAL MEDICINE

## 2018-03-08 PROCEDURE — 82570 ASSAY OF URINE CREATININE: CPT | Performed by: INTERNAL MEDICINE

## 2018-03-08 PROCEDURE — 84443 ASSAY THYROID STIM HORMONE: CPT | Performed by: INTERNAL MEDICINE

## 2018-03-08 PROCEDURE — 84439 ASSAY OF FREE THYROXINE: CPT | Performed by: INTERNAL MEDICINE

## 2018-03-08 PROCEDURE — 80061 LIPID PANEL: CPT | Performed by: INTERNAL MEDICINE

## 2018-03-08 PROCEDURE — 82043 UR ALBUMIN QUANTITATIVE: CPT | Performed by: INTERNAL MEDICINE

## 2018-03-08 PROCEDURE — G0103 PSA SCREENING: HCPCS | Performed by: INTERNAL MEDICINE

## 2018-03-12 ENCOUNTER — OFFICE VISIT (OUTPATIENT)
Dept: FAMILY MEDICINE CLINIC | Facility: CLINIC | Age: 78
End: 2018-03-12

## 2018-03-12 VITALS
WEIGHT: 207 LBS | HEART RATE: 88 BPM | SYSTOLIC BLOOD PRESSURE: 136 MMHG | TEMPERATURE: 97.2 F | DIASTOLIC BLOOD PRESSURE: 66 MMHG | HEIGHT: 72 IN | BODY MASS INDEX: 28.04 KG/M2

## 2018-03-12 DIAGNOSIS — E03.9 ACQUIRED HYPOTHYROIDISM: Chronic | ICD-10-CM

## 2018-03-12 DIAGNOSIS — E11.8 TYPE 2 DIABETES MELLITUS WITH COMPLICATION, WITHOUT LONG-TERM CURRENT USE OF INSULIN (HCC): Primary | Chronic | ICD-10-CM

## 2018-03-12 DIAGNOSIS — M15.9 PRIMARY OSTEOARTHRITIS INVOLVING MULTIPLE JOINTS: Chronic | ICD-10-CM

## 2018-03-12 DIAGNOSIS — N40.1 BENIGN NON-NODULAR PROSTATIC HYPERPLASIA WITH LOWER URINARY TRACT SYMPTOMS: Chronic | ICD-10-CM

## 2018-03-12 DIAGNOSIS — N18.30 CHRONIC RENAL IMPAIRMENT, STAGE 3 (MODERATE) (HCC): Chronic | ICD-10-CM

## 2018-03-12 DIAGNOSIS — E11.69 ERECTILE DYSFUNCTION ASSOCIATED WITH TYPE 2 DIABETES MELLITUS (HCC): Chronic | ICD-10-CM

## 2018-03-12 DIAGNOSIS — I10 ESSENTIAL HYPERTENSION: Chronic | ICD-10-CM

## 2018-03-12 DIAGNOSIS — N52.1 ERECTILE DYSFUNCTION ASSOCIATED WITH TYPE 2 DIABETES MELLITUS (HCC): Chronic | ICD-10-CM

## 2018-03-12 DIAGNOSIS — E78.2 MIXED HYPERLIPIDEMIA: Chronic | ICD-10-CM

## 2018-03-12 DIAGNOSIS — I25.10 CORONARY ARTERY DISEASE INVOLVING NATIVE CORONARY ARTERY OF NATIVE HEART WITHOUT ANGINA PECTORIS: Chronic | ICD-10-CM

## 2018-03-12 DIAGNOSIS — IMO0001 NO DIABETIC RETINOPATHY IN BOTH EYES: Chronic | ICD-10-CM

## 2018-03-12 PROCEDURE — 99214 OFFICE O/P EST MOD 30 MIN: CPT | Performed by: INTERNAL MEDICINE

## 2018-03-12 RX ORDER — GABAPENTIN 100 MG/1
100 CAPSULE ORAL
COMMUNITY
End: 2018-09-13

## 2018-03-12 RX ORDER — PIOGLITAZONEHYDROCHLORIDE 15 MG/1
15 TABLET ORAL DAILY
Qty: 90 TABLET | Refills: 3 | Status: SHIPPED | OUTPATIENT
Start: 2018-03-12 | End: 2019-02-27 | Stop reason: SDUPTHER

## 2018-03-12 RX ORDER — LEVOTHYROXINE SODIUM 0.03 MG/1
25 TABLET ORAL DAILY
Qty: 90 TABLET | Refills: 3 | Status: SHIPPED | OUTPATIENT
Start: 2018-03-12 | End: 2019-03-14 | Stop reason: SDUPTHER

## 2018-03-12 RX ORDER — SILDENAFIL CITRATE 20 MG/1
TABLET ORAL
Qty: 30 TABLET | Refills: 5 | Status: SHIPPED | OUTPATIENT
Start: 2018-03-12 | End: 2018-09-13

## 2018-03-12 RX ORDER — NAPROXEN 500 MG/1
500 TABLET ORAL DAILY PRN
Qty: 90 TABLET | Refills: 3 | Status: SHIPPED | OUTPATIENT
Start: 2018-03-12 | End: 2019-03-14 | Stop reason: SDUPTHER

## 2018-03-12 NOTE — PROGRESS NOTES
Chief Complaint   Patient presents with   • Hypertension   • Hyperlipidemia   • Diabetes     Subjective   Darwin Fraga is a 78 y.o. male who presents to the office for follow-up and review of labs. He has diabetes and his blood sugar has been controlled. He is tolerating his medications without any side effects.  He has hypertension and his blood pressure has been controlled.  He takes Synthroid daily for treatment of hypothyroidism.  He has osteoarthritis and takes naproxen as needed.  He uses this sparingly secondary to renal insufficiency. He has a history of coronary artery disease and had a mild heart attack a number of years ago.  He follows with cardiology for this.  He has BPH and takes Flomax daily.  He denies any urinary symptoms.  He has GERD which is well controlled with Protonix. He complains of erectile dysfunction which has recently been getting worse.  He has never taken medication for this.  He was recently started on gabapentin for treatment of neuropathic pain in his legs.  This has been working well for him.    The following portions of the patient's history were reviewed and updated as appropriate: allergies, current medications, past family history, past medical history, past social history, past surgical history and problem list.    Review of Systems   Constitutional: Negative for chills, fatigue and fever.   HENT: Negative for congestion, sneezing, sore throat and trouble swallowing.    Eyes: Negative for visual disturbance.   Respiratory: Negative for cough, chest tightness, shortness of breath and wheezing.    Cardiovascular: Negative for chest pain, palpitations and leg swelling.   Gastrointestinal: Negative for abdominal pain, constipation, diarrhea, nausea and vomiting.   Genitourinary: Negative for dysuria, frequency and urgency.   Musculoskeletal: Negative for neck pain.   Skin: Negative for rash.   Neurological: Negative for dizziness, weakness and headaches.  "  Psychiatric/Behavioral:        Patient denies any feelings of depression and has not felt down, hopeless or lost interest in any activities.   All other systems reviewed and are negative.      Objective   Vitals:    03/12/18 0839   BP: 136/66   BP Location: Left arm   Patient Position: Sitting   Cuff Size: Large Adult   Pulse: 88   Temp: 97.2 °F (36.2 °C)   TempSrc: Oral   Weight: 93.9 kg (207 lb)   Height: 182.9 cm (72\")   PainSc: 0-No pain     Physical Exam   Constitutional: He is oriented to person, place, and time. He appears well-developed and well-nourished. No distress.   HENT:   Head: Normocephalic and atraumatic.   Nose: Nose normal.   Mouth/Throat: Oropharynx is clear and moist. No oropharyngeal exudate.   Eyes: Conjunctivae and EOM are normal. Pupils are equal, round, and reactive to light. No scleral icterus.   Neck: Normal range of motion. Neck supple.   Cardiovascular: Normal rate, regular rhythm and normal heart sounds.  Exam reveals no gallop and no friction rub.    No murmur heard.  Pulmonary/Chest: Effort normal and breath sounds normal. No respiratory distress. He has no wheezes. He has no rales.   Abdominal: Soft. Bowel sounds are normal. He exhibits no distension. There is no tenderness. There is no rebound and no guarding.   Musculoskeletal: Normal range of motion. He exhibits no edema.    Darwin had a diabetic foot exam performed today.   During the foot exam he had a monofilament test performed.    Neurological Sensory Findings - Unaltered hot/cold right ankle/foot discrimination and unaltered hot/cold left ankle/foot discrimination. Unaltered sharp/dull right ankle/foot discrimination and unaltered sharp/dull left ankle/foot discrimination. No right ankle/foot altered proprioception and no left ankle/foot altered proprioception  Vascular Status -  His right foot exhibits abnormal right foot vasculature . His right foot exhibits normal right foot edema. His left foot exhibits abnormal left " foot vasculature . His left foot exhibits normal left foot edema.  Skin Integrity  -  His right foot skin is not intact. He has right foot onychomycosis.  His left foot skin is not intact.He has left foot onychomycosis..   Foot Structure and Biomechanics -  His right foot has no claw toes and no hammer toes present. His left foot has no claw toes and no hammer toes.  Lymphadenopathy:     He has no cervical adenopathy.   Neurological: He is alert and oriented to person, place, and time. No cranial nerve deficit.   Skin: Skin is warm and dry. No rash noted.   Psychiatric: He has a normal mood and affect. His behavior is normal. Judgment and thought content normal.   Nursing note and vitals reviewed.      Assessment/Plan   Darwin was seen today for hypertension, hyperlipidemia and diabetes.    Diagnoses and all orders for this visit:    Type 2 diabetes mellitus with complication, without long-term current use of insulin  -     Hemoglobin A1c; Future  -     pioglitazone (ACTOS) 15 MG tablet; Take 1 tablet by mouth Daily.    Essential hypertension  -     CBC & Differential; Future  -     Comprehensive Metabolic Panel; Future  -     Urinalysis With / Culture If Indicated - Urine, Clean Catch; Future    Mixed hyperlipidemia  -     LDL Cholesterol, Direct; Future    Coronary artery disease involving native coronary artery of native heart without angina pectoris    Acquired hypothyroidism  -     T4, Free; Future  -     TSH; Future  -     levothyroxine (SYNTHROID, LEVOTHROID) 25 MCG tablet; Take 1 tablet by mouth Daily.    Benign non-nodular prostatic hyperplasia with lower urinary tract symptoms    Chronic renal impairment, stage 3 (moderate)    Primary osteoarthritis involving multiple joints  -     naproxen (NAPROSYN) 500 MG tablet; Take 1 tablet by mouth Daily As Needed for Mild Pain  or Moderate Pain .    Erectile dysfunction associated with type 2 diabetes mellitus  -     sildenafil (REVATIO) 20 MG tablet; Use 1-5  tablets as needed for erectile dysfunction. Do not exceed 100mg in 24 hours.    No diabetic retinopathy in both eyes  Comments:  Eye Exam: 05/19/2017         Labs are reviewed with patient. His diabetes is controlled. He may monitor blood sugar one time daily.  He will continue with his current diabetic medication.  His creatinine is elevated but baseline at 1.6.  He does not currently take an ACE inhibitor secondary to his creatinine.  His lipids are doing well and he will continue with pravastatin and niacin.  His TSH is normal so he will continue with the current dose of Synthroid.  His blood pressure is well-controlled.  He will continue with Flomax for treatment of the BPH.  He is given sildenafil for treatment of the erectile dysfunction.  He will continue with gabapentin for treatment of the neuropathic pain.      PHQ-2/PHQ-9 Depression Screening 3/12/2018   Little interest or pleasure in doing things 0   Feeling down, depressed, or hopeless 0   Trouble falling or staying asleep, or sleeping too much 0   Feeling tired or having little energy 0   Poor appetite or overeating 0   Feeling bad about yourself - or that you are a failure or have let yourself or your family down 0   Trouble concentrating on things, such as reading the newspaper or watching television 0   Moving or speaking so slowly that other people could have noticed. Or the opposite - being so fidgety or restless that you have been moving around a lot more than usual 0   Thoughts that you would be better off dead, or of hurting yourself in some way 0   Total Score 0         Lab on 03/08/2018   Component Date Value Ref Range Status   • Glucose 03/08/2018 173* 70 - 100 mg/dL Final   • BUN 03/08/2018 29* 8 - 25 mg/dL Final   • Creatinine 03/08/2018 1.60* 0.40 - 1.30 mg/dL Final   • Sodium 03/08/2018 138  134 - 146 mmol/L Final   • Potassium 03/08/2018 3.9  3.4 - 5.4 mmol/L Final   • Chloride 03/08/2018 101  100 - 112 mmol/L Final   • CO2 03/08/2018  28.0  20.0 - 32.0 mmol/L Final   • Calcium 03/08/2018 9.5  8.4 - 10.8 mg/dL Final   • Total Protein 03/08/2018 7.1  6.7 - 8.2 g/dL Final   • Albumin 03/08/2018 4.20  3.20 - 5.50 g/dL Final   • ALT (SGPT) 03/08/2018 21  10 - 60 U/L Final   • AST (SGOT) 03/08/2018 22  10 - 60 U/L Final   • Alkaline Phosphatase 03/08/2018 70  15 - 121 U/L Final   • Total Bilirubin 03/08/2018 1.2* 0.2 - 1.0 mg/dL Final   • eGFR Non African Amer 03/08/2018 42  42 - 98 mL/min/1.73 Final   • Globulin 03/08/2018 2.9  2.5 - 4.6 gm/dL Final   • A/G Ratio 03/08/2018 1.4  1.0 - 3.0 g/dL Final   • BUN/Creatinine Ratio 03/08/2018 18.1  7.0 - 25.0 Final   • Anion Gap 03/08/2018 9.0  5.0 - 15.0 mmol/L Final   • Hemoglobin A1C 03/08/2018 7.0* 4 - 5.6 % Final   • Total Cholesterol 03/08/2018 163  150 - 200 mg/dL Final   • Triglycerides 03/08/2018 145  35 - 160 mg/dL Final   • HDL Cholesterol 03/08/2018 38  35 - 100 mg/dL Final   • LDL Cholesterol  03/08/2018 96  mg/dL Final   • VLDL Cholesterol 03/08/2018 29  mg/dL Final   • LDL/HDL Ratio 03/08/2018 2.53   Final   • PSA 03/08/2018 2.190  0.000 - 4.000 ng/mL Final   • Free T4 03/08/2018 1.37  0.78 - 2.19 ng/dL Final   • TSH 03/08/2018 3.500  0.460 - 4.680 mIU/mL Final   • Color, UA 03/08/2018 Yellow  Yellow, Straw Final   • Appearance, UA 03/08/2018 Slightly Cloudy* Clear Final   • pH, UA 03/08/2018 5.5  5.5 - 8.0 Final   • Specific Gravity, UA 03/08/2018 1.025  1.005 - 1.030 Final   • Glucose, UA 03/08/2018 Negative  Negative Final   • Ketones, UA 03/08/2018 Trace* Negative Final   • Bilirubin, UA 03/08/2018 Negative  Negative Final   • Blood, UA 03/08/2018 Negative  Negative Final   • Protein, UA 03/08/2018 Negative  Negative Final   • Leuk Esterase, UA 03/08/2018 Negative  Negative Final   • Nitrite, UA 03/08/2018 Negative  Negative Final   • Urobilinogen, UA 03/08/2018 0.2 E.U./dL  0.2 - 1.0 E.U./dL Final   • Microalbumin/Creatinine Ratio 03/08/2018 6.2  0.0 - 30.0 mg/g Final   • Creatinine, Urine  03/08/2018 242.9  mg/dL Final   • Microalbumin, Urine 03/08/2018 1.5  mg/L Final   • WBC 03/08/2018 9.79  3.20 - 9.80 10*3/mm3 Final   • RBC 03/08/2018 4.96  4.37 - 5.74 10*6/mm3 Final   • Hemoglobin 03/08/2018 14.5  13.7 - 17.3 g/dL Final   • Hematocrit 03/08/2018 43.9  39.0 - 49.0 % Final   • MCV 03/08/2018 88.5  80.0 - 98.0 fL Final   • MCH 03/08/2018 29.2  26.5 - 34.0 pg Final   • MCHC 03/08/2018 33.0  31.5 - 36.3 g/dL Final   • RDW 03/08/2018 13.9  11.5 - 14.5 % Final   • RDW-SD 03/08/2018 44.2* 35.1 - 43.9 fl Final   • MPV 03/08/2018 10.7  8.0 - 12.0 fL Final   • Platelets 03/08/2018 219  150 - 450 10*3/mm3 Final   • Neutrophil % 03/08/2018 60.1  37.0 - 80.0 % Final   • Lymphocyte % 03/08/2018 23.3  10.0 - 50.0 % Final   • Monocyte % 03/08/2018 12.8* 0.0 - 12.0 % Final   • Eosinophil % 03/08/2018 3.5  0.0 - 7.0 % Final   • Basophil % 03/08/2018 0.3  0.0 - 2.0 % Final   • Neutrophils, Absolute 03/08/2018 5.89  2.00 - 8.60 10*3/mm3 Final   • Lymphocytes, Absolute 03/08/2018 2.28  0.60 - 4.20 10*3/mm3 Final   • Monocytes, Absolute 03/08/2018 1.25* 0.00 - 0.90 10*3/mm3 Final   • Eosinophils, Absolute 03/08/2018 0.34  0.00 - 0.70 10*3/mm3 Final   • Basophils, Absolute 03/08/2018 0.03  0.00 - 0.20 10*3/mm3 Final   ]

## 2018-03-21 PROBLEM — IMO0001 NO DIABETIC RETINOPATHY IN BOTH EYES: Chronic | Status: ACTIVE | Noted: 2018-01-22

## 2018-04-08 DIAGNOSIS — I10 ESSENTIAL HYPERTENSION: Chronic | ICD-10-CM

## 2018-04-09 RX ORDER — AMLODIPINE BESYLATE 10 MG/1
TABLET ORAL
Qty: 90 TABLET | Refills: 3 | Status: SHIPPED | OUTPATIENT
Start: 2018-04-09 | End: 2018-09-13 | Stop reason: SDUPTHER

## 2018-08-23 DIAGNOSIS — E11.9 TYPE 2 DIABETES MELLITUS WITHOUT COMPLICATION, WITHOUT LONG-TERM CURRENT USE OF INSULIN (HCC): Chronic | ICD-10-CM

## 2018-08-23 RX ORDER — SITAGLIPTIN AND METFORMIN HYDROCHLORIDE 500; 50 MG/1; MG/1
TABLET, FILM COATED ORAL
Qty: 180 TABLET | Refills: 3 | OUTPATIENT
Start: 2018-08-23

## 2018-08-28 DIAGNOSIS — E78.2 MIXED HYPERLIPIDEMIA: Chronic | ICD-10-CM

## 2018-08-28 RX ORDER — NIACIN 500 MG/1
TABLET, EXTENDED RELEASE ORAL
Qty: 90 TABLET | Refills: 0 | Status: SHIPPED | OUTPATIENT
Start: 2018-08-28 | End: 2018-11-08 | Stop reason: SDUPTHER

## 2018-09-04 ENCOUNTER — LAB (OUTPATIENT)
Dept: LAB | Facility: OTHER | Age: 78
End: 2018-09-04

## 2018-09-04 DIAGNOSIS — E78.2 MIXED HYPERLIPIDEMIA: Chronic | ICD-10-CM

## 2018-09-04 DIAGNOSIS — E11.8 TYPE 2 DIABETES MELLITUS WITH COMPLICATION, WITHOUT LONG-TERM CURRENT USE OF INSULIN (HCC): Chronic | ICD-10-CM

## 2018-09-04 DIAGNOSIS — E03.9 ACQUIRED HYPOTHYROIDISM: Chronic | ICD-10-CM

## 2018-09-04 DIAGNOSIS — I10 ESSENTIAL HYPERTENSION: ICD-10-CM

## 2018-09-04 LAB
ALBUMIN SERPL-MCNC: 4.1 G/DL (ref 3.5–5)
ALBUMIN/GLOB SERPL: 1.4 G/DL (ref 1.1–1.8)
ALP SERPL-CCNC: 99 U/L (ref 38–126)
ALT SERPL W P-5'-P-CCNC: 18 U/L
ANION GAP SERPL CALCULATED.3IONS-SCNC: 11 MMOL/L (ref 5–15)
ARTICHOKE IGE QN: 77 MG/DL (ref 1–129)
AST SERPL-CCNC: 23 U/L (ref 17–59)
BASOPHILS # BLD AUTO: 0.05 10*3/MM3 (ref 0–0.2)
BASOPHILS NFR BLD AUTO: 0.6 % (ref 0–2)
BILIRUB SERPL-MCNC: 1 MG/DL (ref 0.2–1.3)
BILIRUB UR QL STRIP: NEGATIVE
BUN BLD-MCNC: 26 MG/DL (ref 9–20)
BUN/CREAT SERPL: 17 (ref 7–25)
CALCIUM SPEC-SCNC: 9.2 MG/DL (ref 8.4–10.2)
CHLORIDE SERPL-SCNC: 106 MMOL/L (ref 98–107)
CLARITY UR: CLEAR
CO2 SERPL-SCNC: 24 MMOL/L (ref 22–30)
COLOR UR: YELLOW
CREAT BLD-MCNC: 1.53 MG/DL (ref 0.66–1.25)
DEPRECATED RDW RBC AUTO: 45.5 FL (ref 35.1–43.9)
EOSINOPHIL # BLD AUTO: 0.39 10*3/MM3 (ref 0–0.7)
EOSINOPHIL NFR BLD AUTO: 4.3 % (ref 0–7)
ERYTHROCYTE [DISTWIDTH] IN BLOOD BY AUTOMATED COUNT: 14.2 % (ref 11.5–14.5)
GFR SERPL CREATININE-BSD FRML MDRD: 44 ML/MIN/1.73 (ref 42–98)
GLOBULIN UR ELPH-MCNC: 3 GM/DL (ref 2.3–3.5)
GLUCOSE BLD-MCNC: 157 MG/DL (ref 74–99)
GLUCOSE UR STRIP-MCNC: NEGATIVE MG/DL
HBA1C MFR BLD: 7.1 % (ref 4–5.6)
HCT VFR BLD AUTO: 44.1 % (ref 39–49)
HGB BLD-MCNC: 14.4 G/DL (ref 13.7–17.3)
HGB UR QL STRIP.AUTO: NEGATIVE
KETONES UR QL STRIP: NEGATIVE
LEUKOCYTE ESTERASE UR QL STRIP.AUTO: NEGATIVE
LYMPHOCYTES # BLD AUTO: 2.14 10*3/MM3 (ref 0.6–4.2)
LYMPHOCYTES NFR BLD AUTO: 23.8 % (ref 10–50)
MCH RBC QN AUTO: 29.2 PG (ref 26.5–34)
MCHC RBC AUTO-ENTMCNC: 32.7 G/DL (ref 31.5–36.3)
MCV RBC AUTO: 89.5 FL (ref 80–98)
MONOCYTES # BLD AUTO: 1.35 10*3/MM3 (ref 0–0.9)
MONOCYTES NFR BLD AUTO: 15 % (ref 0–12)
NEUTROPHILS # BLD AUTO: 5.08 10*3/MM3 (ref 2–8.6)
NEUTROPHILS NFR BLD AUTO: 56.3 % (ref 37–80)
NITRITE UR QL STRIP: NEGATIVE
PH UR STRIP.AUTO: 5.5 [PH] (ref 5.5–8)
PLATELET # BLD AUTO: 207 10*3/MM3 (ref 150–450)
PMV BLD AUTO: 10.8 FL (ref 8–12)
POTASSIUM BLD-SCNC: 3.6 MMOL/L (ref 3.4–5)
PROT SERPL-MCNC: 7.1 G/DL (ref 6.3–8.2)
PROT UR QL STRIP: NEGATIVE
RBC # BLD AUTO: 4.93 10*6/MM3 (ref 4.37–5.74)
SODIUM BLD-SCNC: 141 MMOL/L (ref 137–145)
SP GR UR STRIP: 1.02 (ref 1–1.03)
T4 FREE SERPL-MCNC: 1.42 NG/DL (ref 0.78–2.19)
TSH SERPL DL<=0.05 MIU/L-ACNC: 3.53 MIU/ML (ref 0.46–4.68)
UROBILINOGEN UR QL STRIP: NORMAL
WBC NRBC COR # BLD: 9.01 10*3/MM3 (ref 3.2–9.8)

## 2018-09-04 PROCEDURE — 85025 COMPLETE CBC W/AUTO DIFF WBC: CPT | Performed by: INTERNAL MEDICINE

## 2018-09-04 PROCEDURE — 80053 COMPREHEN METABOLIC PANEL: CPT | Performed by: INTERNAL MEDICINE

## 2018-09-04 PROCEDURE — 84443 ASSAY THYROID STIM HORMONE: CPT | Performed by: INTERNAL MEDICINE

## 2018-09-04 PROCEDURE — 81003 URINALYSIS AUTO W/O SCOPE: CPT | Performed by: INTERNAL MEDICINE

## 2018-09-04 PROCEDURE — 36415 COLL VENOUS BLD VENIPUNCTURE: CPT | Performed by: INTERNAL MEDICINE

## 2018-09-04 PROCEDURE — 83036 HEMOGLOBIN GLYCOSYLATED A1C: CPT | Performed by: INTERNAL MEDICINE

## 2018-09-04 PROCEDURE — 83721 ASSAY OF BLOOD LIPOPROTEIN: CPT | Performed by: INTERNAL MEDICINE

## 2018-09-04 PROCEDURE — 84439 ASSAY OF FREE THYROXINE: CPT | Performed by: INTERNAL MEDICINE

## 2018-09-05 DIAGNOSIS — K21.9 GASTROESOPHAGEAL REFLUX DISEASE WITHOUT ESOPHAGITIS: Chronic | ICD-10-CM

## 2018-09-05 DIAGNOSIS — E78.2 MIXED HYPERLIPIDEMIA: Chronic | ICD-10-CM

## 2018-09-06 RX ORDER — PRAVASTATIN SODIUM 40 MG
TABLET ORAL
Qty: 90 TABLET | Refills: 3 | Status: SHIPPED | OUTPATIENT
Start: 2018-09-06 | End: 2019-09-11 | Stop reason: SDUPTHER

## 2018-09-06 RX ORDER — PANTOPRAZOLE SODIUM 40 MG/1
TABLET, DELAYED RELEASE ORAL
Qty: 90 TABLET | Refills: 3 | Status: SHIPPED | OUTPATIENT
Start: 2018-09-06 | End: 2019-03-14 | Stop reason: SDUPTHER

## 2018-09-07 DIAGNOSIS — N40.1 BENIGN NON-NODULAR PROSTATIC HYPERPLASIA WITH LOWER URINARY TRACT SYMPTOMS: Chronic | ICD-10-CM

## 2018-09-07 RX ORDER — TAMSULOSIN HYDROCHLORIDE 0.4 MG/1
CAPSULE ORAL
Qty: 90 CAPSULE | Refills: 0 | Status: SHIPPED | OUTPATIENT
Start: 2018-09-07 | End: 2019-01-13 | Stop reason: SDUPTHER

## 2018-09-13 ENCOUNTER — OFFICE VISIT (OUTPATIENT)
Dept: FAMILY MEDICINE CLINIC | Facility: CLINIC | Age: 78
End: 2018-09-13

## 2018-09-13 VITALS
DIASTOLIC BLOOD PRESSURE: 78 MMHG | SYSTOLIC BLOOD PRESSURE: 128 MMHG | HEIGHT: 72 IN | TEMPERATURE: 97.4 F | OXYGEN SATURATION: 98 % | BODY MASS INDEX: 29.26 KG/M2 | WEIGHT: 216 LBS | HEART RATE: 79 BPM

## 2018-09-13 DIAGNOSIS — E03.9 ACQUIRED HYPOTHYROIDISM: Chronic | ICD-10-CM

## 2018-09-13 DIAGNOSIS — E11.8 TYPE 2 DIABETES MELLITUS WITH COMPLICATION, WITHOUT LONG-TERM CURRENT USE OF INSULIN (HCC): Chronic | ICD-10-CM

## 2018-09-13 DIAGNOSIS — Z12.5 SCREENING FOR PROSTATE CANCER: ICD-10-CM

## 2018-09-13 DIAGNOSIS — Z00.00 INITIAL MEDICARE ANNUAL WELLNESS VISIT: Primary | ICD-10-CM

## 2018-09-13 DIAGNOSIS — I25.10 CORONARY ARTERY DISEASE INVOLVING NATIVE CORONARY ARTERY OF NATIVE HEART WITHOUT ANGINA PECTORIS: Chronic | ICD-10-CM

## 2018-09-13 DIAGNOSIS — N52.1 ERECTILE DYSFUNCTION ASSOCIATED WITH TYPE 2 DIABETES MELLITUS (HCC): Chronic | ICD-10-CM

## 2018-09-13 DIAGNOSIS — N40.1 BENIGN NON-NODULAR PROSTATIC HYPERPLASIA WITH LOWER URINARY TRACT SYMPTOMS: Chronic | ICD-10-CM

## 2018-09-13 DIAGNOSIS — I10 ESSENTIAL HYPERTENSION: Chronic | ICD-10-CM

## 2018-09-13 DIAGNOSIS — E11.69 ERECTILE DYSFUNCTION ASSOCIATED WITH TYPE 2 DIABETES MELLITUS (HCC): Chronic | ICD-10-CM

## 2018-09-13 DIAGNOSIS — N18.30 CHRONIC RENAL IMPAIRMENT, STAGE 3 (MODERATE) (HCC): Chronic | ICD-10-CM

## 2018-09-13 DIAGNOSIS — E78.2 MIXED HYPERLIPIDEMIA: Chronic | ICD-10-CM

## 2018-09-13 PROCEDURE — 99214 OFFICE O/P EST MOD 30 MIN: CPT | Performed by: INTERNAL MEDICINE

## 2018-09-13 PROCEDURE — G0438 PPPS, INITIAL VISIT: HCPCS | Performed by: INTERNAL MEDICINE

## 2018-09-13 RX ORDER — HYDROCHLOROTHIAZIDE 12.5 MG/1
12.5 CAPSULE, GELATIN COATED ORAL DAILY
Qty: 90 CAPSULE | Refills: 3 | Status: SHIPPED | OUTPATIENT
Start: 2018-09-13 | End: 2019-09-16 | Stop reason: SDUPTHER

## 2018-09-13 RX ORDER — AMLODIPINE BESYLATE 10 MG/1
10 TABLET ORAL DAILY
Qty: 90 TABLET | Refills: 3 | Status: SHIPPED | OUTPATIENT
Start: 2018-09-13 | End: 2019-09-16 | Stop reason: SDUPTHER

## 2018-09-13 RX ORDER — FOLIC ACID 1 MG/1
1 TABLET ORAL DAILY
Qty: 90 TABLET | Refills: 3 | Status: SHIPPED | OUTPATIENT
Start: 2018-09-13 | End: 2019-10-08 | Stop reason: SDUPTHER

## 2018-09-13 RX ORDER — SITAGLIPTIN AND METFORMIN HYDROCHLORIDE 500; 50 MG/1; MG/1
1 TABLET, FILM COATED ORAL 2 TIMES DAILY
Qty: 180 TABLET | Refills: 3 | Status: SHIPPED | OUTPATIENT
Start: 2018-09-13 | End: 2019-10-08 | Stop reason: SDUPTHER

## 2018-09-13 RX ORDER — GLIMEPIRIDE 4 MG/1
4 TABLET ORAL DAILY
Qty: 180 TABLET | Refills: 3 | Status: SHIPPED | OUTPATIENT
Start: 2018-09-13 | End: 2018-09-13 | Stop reason: SDUPTHER

## 2018-09-13 RX ORDER — TADALAFIL 20 MG/1
20 TABLET ORAL
Qty: 3 TABLET | Refills: 2 | Status: SHIPPED | OUTPATIENT
Start: 2018-09-13 | End: 2019-09-16

## 2018-09-13 RX ORDER — GLIMEPIRIDE 4 MG/1
4 TABLET ORAL 2 TIMES DAILY
Qty: 180 TABLET | Refills: 3 | Status: SHIPPED | OUTPATIENT
Start: 2018-09-13 | End: 2019-10-08 | Stop reason: SDUPTHER

## 2018-09-13 RX ORDER — LABETALOL 100 MG/1
100 TABLET, FILM COATED ORAL 2 TIMES DAILY
Qty: 180 TABLET | Refills: 3 | Status: SHIPPED | OUTPATIENT
Start: 2018-09-13 | End: 2019-09-16 | Stop reason: SDUPTHER

## 2018-09-13 NOTE — PROGRESS NOTES
Chief Complaint   Patient presents with   • Wellness     Initial Medicare Wellness   • Diabetes   • Hypertension   • Hyperlipidemia     Subjective   Darwin Fraga is a 78 y.o. male who presents to the office for follow-up and review of labs. He has diabetes and his blood sugar has been controlled. He is tolerating his medications without any side effects.  He has hypertension and his blood pressure has been controlled.  He takes Synthroid daily for treatment of hypothyroidism.  He has osteoarthritis and takes naproxen as needed.  He uses this sparingly secondary to renal insufficiency. He has a history of coronary artery disease and had a mild heart attack a number of years ago.  He continues to follow with cardiology for this.  He has BPH and takes Flomax daily.  He denies any urinary symptoms.  He has GERD which is well controlled with Protonix.     He was previously taking gabapentin for treatment of neuropathic pain in his legs.  The pain resolved, so he discontinued the gabapentin.  He has not had any further symptoms of the neuropathy.      At the last visit, I gave him sildenafil for treatment of erectile dysfunction.  He did not notice any improvement with this medication.  He is asking if there are any other medications to try.    The following portions of the patient's history were reviewed and updated as appropriate: allergies, current medications, past family history, past medical history, past social history, past surgical history and problem list.    Review of Systems   Constitutional: Negative for chills, fatigue and fever.   HENT: Negative for congestion, sneezing, sore throat and trouble swallowing.    Eyes: Negative for visual disturbance.   Respiratory: Negative for cough, chest tightness, shortness of breath and wheezing.    Cardiovascular: Negative for chest pain, palpitations and leg swelling.   Gastrointestinal: Negative for abdominal pain, constipation, diarrhea, nausea and vomiting.  "  Genitourinary: Negative for dysuria, frequency and urgency.   Musculoskeletal: Negative for neck pain.   Skin: Negative for rash.   Neurological: Negative for dizziness, weakness and headaches.   Psychiatric/Behavioral:        Patient denies any feelings of depression and has not felt down, hopeless or lost interest in any activities.   All other systems reviewed and are negative.      Objective   Vitals:    09/13/18 0856   BP: 128/78   BP Location: Left arm   Patient Position: Sitting   Cuff Size: Adult   Pulse: 79   Temp: 97.4 °F (36.3 °C)   TempSrc: Oral   SpO2: 98%   Weight: 98 kg (216 lb)  Comment: Pt is a  and has gun belt on   Height: 182.9 cm (72\")   PainSc: 0-No pain     Physical Exam   Constitutional: He is oriented to person, place, and time. He appears well-developed and well-nourished. No distress.   HENT:   Head: Normocephalic and atraumatic.   Nose: Nose normal.   Mouth/Throat: Oropharynx is clear and moist. No oropharyngeal exudate.   Eyes: Pupils are equal, round, and reactive to light. Conjunctivae and EOM are normal. No scleral icterus.   Neck: Normal range of motion. Neck supple.   Cardiovascular: Normal rate, regular rhythm and normal heart sounds.  Exam reveals no gallop and no friction rub.    No murmur heard.  Pulmonary/Chest: Effort normal and breath sounds normal. No respiratory distress. He has no wheezes. He has no rales.   Abdominal: Soft. Bowel sounds are normal. He exhibits no distension. There is no tenderness. There is no rebound and no guarding.   Musculoskeletal: Normal range of motion. He exhibits no edema.   Lymphadenopathy:     He has no cervical adenopathy.   Neurological: He is alert and oriented to person, place, and time. No cranial nerve deficit.   Skin: Skin is warm and dry. No rash noted.   Psychiatric: He has a normal mood and affect. His behavior is normal. Judgment and thought content normal.   Nursing note and vitals reviewed.      Assessment/Plan "   Darwin was seen today for wellness, diabetes, hypertension and hyperlipidemia.    Diagnoses and all orders for this visit:    Initial Medicare annual wellness visit    Type 2 diabetes mellitus with complication, without long-term current use of insulin (CMS/[facility])  -     Hemoglobin A1c; Future  -     Microalbumin / Creatinine Urine Ratio - Urine, Clean Catch; Future  -     glimepiride (AMARYL) 4 MG tablet; Take 1 tablet by mouth Daily.  -     JANUMET  MG per tablet; Take 1 tablet by mouth 2 (Two) Times a Day.    Essential hypertension  -     CBC & Differential; Future  -     Comprehensive Metabolic Panel; Future  -     Urinalysis With Culture If Indicated - Urine, Clean Catch; Future  -     amLODIPine (NORVASC) 10 MG tablet; Take 1 tablet by mouth Daily.  -     hydrochlorothiazide (MICROZIDE) 12.5 MG capsule; Take 1 capsule by mouth Daily.  -     labetalol (NORMODYNE) 100 MG tablet; Take 1 tablet by mouth 2 (Two) Times a Day.    Mixed hyperlipidemia  -     Lipid Panel; Future    Acquired hypothyroidism  -     T4, Free; Future  -     TSH; Future    Chronic renal impairment, stage 3 (moderate)    Benign non-nodular prostatic hyperplasia with lower urinary tract symptoms    Screening for prostate cancer  -     PSA Screen; Future    Erectile dysfunction associated with type 2 diabetes mellitus (CMS/[facility])  -     tadalafil (CIALIS) 20 MG tablet; Take 1 tablet by mouth Every 72 (Seventy-Two) Hours As Needed for erectile dysfunction.    Coronary artery disease involving native coronary artery of native heart without angina pectoris  -     folic acid (FOLVITE) 1 MG tablet; Take 1 tablet by mouth Daily.         Labs are reviewed with patient. His diabetes is controlled. He may monitor blood sugar one time daily.  He will continue with his current diabetic medication.  His creatinine is elevated but baseline at 1.5.  He does not currently take an ACE inhibitor secondary to his creatinine.  His LDL is at goal, and he will  continue with pravastatin and niacin.  His TSH is normal so he will continue with the current dose of Synthroid.  His blood pressure is well-controlled.  He will continue with Flomax for treatment of the BPH.  He will try Cialis for treatment of the erectile dysfunction.  If this is in effective, I have recommended urology referral to explore other options.      PHQ-2/PHQ-9 Depression Screening 9/13/2018   Little interest or pleasure in doing things 0   Feeling down, depressed, or hopeless 0   Trouble falling or staying asleep, or sleeping too much -   Feeling tired or having little energy -   Poor appetite or overeating -   Feeling bad about yourself - or that you are a failure or have let yourself or your family down -   Trouble concentrating on things, such as reading the newspaper or watching television -   Moving or speaking so slowly that other people could have noticed. Or the opposite - being so fidgety or restless that you have been moving around a lot more than usual -   Thoughts that you would be better off dead, or of hurting yourself in some way -   Total Score 0         Lab on 09/04/2018   Component Date Value Ref Range Status   • Color, UA 09/04/2018 Yellow  Yellow, Straw Final   • Appearance, UA 09/04/2018 Clear  Clear Final   • pH, UA 09/04/2018 5.5  5.5 - 8.0 Final   • Specific Gravity, UA 09/04/2018 1.020  1.005 - 1.030 Final   • Glucose, UA 09/04/2018 Negative  Negative Final   • Ketones, UA 09/04/2018 Negative  Negative Final   • Bilirubin, UA 09/04/2018 Negative  Negative Final   • Blood, UA 09/04/2018 Negative  Negative Final   • Protein, UA 09/04/2018 Negative  Negative Final   • Leuk Esterase, UA 09/04/2018 Negative  Negative Final   • Nitrite, UA 09/04/2018 Negative  Negative Final   • Urobilinogen, UA 09/04/2018 0.2 E.U./dL  0.2 - 1.0 E.U./dL Final   • Glucose 09/04/2018 157* 74 - 99 mg/dL Final   • BUN 09/04/2018 26* 9 - 20 mg/dL Final   • Creatinine 09/04/2018 1.53* 0.66 - 1.25 mg/dL  Final   • Sodium 09/04/2018 141  137 - 145 mmol/L Final   • Potassium 09/04/2018 3.6  3.4 - 5.0 mmol/L Final   • Chloride 09/04/2018 106  98 - 107 mmol/L Final   • CO2 09/04/2018 24.0  22.0 - 30.0 mmol/L Final   • Calcium 09/04/2018 9.2  8.4 - 10.2 mg/dL Final   • Total Protein 09/04/2018 7.1  6.3 - 8.2 g/dL Final   • Albumin 09/04/2018 4.10  3.50 - 5.00 g/dL Final   • ALT (SGPT) 09/04/2018 18  <=50 U/L Final   • AST (SGOT) 09/04/2018 23  17 - 59 U/L Final   • Alkaline Phosphatase 09/04/2018 99  38 - 126 U/L Final   • Total Bilirubin 09/04/2018 1.0  0.2 - 1.3 mg/dL Final   • eGFR Non African Amer 09/04/2018 44  42 - 98 mL/min/1.73 Final   • Globulin 09/04/2018 3.0  2.3 - 3.5 gm/dL Final   • A/G Ratio 09/04/2018 1.4  1.1 - 1.8 g/dL Final   • BUN/Creatinine Ratio 09/04/2018 17.0  7.0 - 25.0 Final   • Anion Gap 09/04/2018 11.0  5.0 - 15.0 mmol/L Final   • Hemoglobin A1C 09/04/2018 7.1* 4 - 5.6 % Final   • LDL Cholesterol  09/04/2018 77  1 - 129 mg/dL Final   • Free T4 09/04/2018 1.42  0.78 - 2.19 ng/dL Final   • TSH 09/04/2018 3.530  0.460 - 4.680 mIU/mL Final   • WBC 09/04/2018 9.01  3.20 - 9.80 10*3/mm3 Final   • RBC 09/04/2018 4.93  4.37 - 5.74 10*6/mm3 Final   • Hemoglobin 09/04/2018 14.4  13.7 - 17.3 g/dL Final   • Hematocrit 09/04/2018 44.1  39.0 - 49.0 % Final   • MCV 09/04/2018 89.5  80.0 - 98.0 fL Final   • MCH 09/04/2018 29.2  26.5 - 34.0 pg Final   • MCHC 09/04/2018 32.7  31.5 - 36.3 g/dL Final   • RDW 09/04/2018 14.2  11.5 - 14.5 % Final   • RDW-SD 09/04/2018 45.5* 35.1 - 43.9 fl Final   • MPV 09/04/2018 10.8  8.0 - 12.0 fL Final   • Platelets 09/04/2018 207  150 - 450 10*3/mm3 Final   • Neutrophil % 09/04/2018 56.3  37.0 - 80.0 % Final   • Lymphocyte % 09/04/2018 23.8  10.0 - 50.0 % Final   • Monocyte % 09/04/2018 15.0* 0.0 - 12.0 % Final   • Eosinophil % 09/04/2018 4.3  0.0 - 7.0 % Final   • Basophil % 09/04/2018 0.6  0.0 - 2.0 % Final   • Neutrophils, Absolute 09/04/2018 5.08  2.00 - 8.60 10*3/mm3 Final    • Lymphocytes, Absolute 09/04/2018 2.14  0.60 - 4.20 10*3/mm3 Final   • Monocytes, Absolute 09/04/2018 1.35* 0.00 - 0.90 10*3/mm3 Final   • Eosinophils, Absolute 09/04/2018 0.39  0.00 - 0.70 10*3/mm3 Final   • Basophils, Absolute 09/04/2018 0.05  0.00 - 0.20 10*3/mm3 Final   ]        .

## 2018-09-13 NOTE — PROGRESS NOTES
QUICK REFERENCE INFORMATION:  The ABCs of the Annual Wellness Visit    Initial Medicare Wellness Visit    HEALTH RISK ASSESSMENT    1940    Recent Hospitalizations:  No hospitalization(s) within the last year..        Current Medical Providers:  Patient Care Team:  Serg Dale MD as PCP - General (Family Medicine)  Berny Merida MD as PCP - Claims Attributed  Ashu Escamilla (Ophthalmology)  Birdie Spencer MD (Pulmonary Disease)        Smoking Status:  History   Smoking Status   • Never Smoker   Smokeless Tobacco   • Never Used       Alcohol Consumption:  History   Alcohol Use No       Depression Screen:   PHQ-2/PHQ-9 Depression Screening 9/13/2018   Little interest or pleasure in doing things 0   Feeling down, depressed, or hopeless 0   Trouble falling or staying asleep, or sleeping too much -   Feeling tired or having little energy -   Poor appetite or overeating -   Feeling bad about yourself - or that you are a failure or have let yourself or your family down -   Trouble concentrating on things, such as reading the newspaper or watching television -   Moving or speaking so slowly that other people could have noticed. Or the opposite - being so fidgety or restless that you have been moving around a lot more than usual -   Thoughts that you would be better off dead, or of hurting yourself in some way -   Total Score 0       Health Habits and Functional and Cognitive Screening:  Functional & Cognitive Status 9/13/2018   Do you have difficulty preparing food and eating? No   Do you have difficulty bathing yourself, getting dressed or grooming yourself? No   Do you have difficulty using the toilet? No   Do you have difficulty moving around from place to place? No   Do you have trouble with steps or getting out of a bed or a chair? No   In the past year have you fallen or experienced a near fall? No   Current Diet Well Balanced Diet   Dental Exam Up to date   Eye Exam Up to date   Exercise  (times per week) 3 times per week   Current Exercise Activities Include Walking   Do you need help using the phone?  No   Are you deaf or do you have serious difficulty hearing?  No   Do you need help with transportation? No   Do you need help shopping? No   Do you need help preparing meals?  No   Do you need help with housework?  No   Do you need help with laundry? No   Do you need help taking your medications? No   Do you need help managing money? No   Do you ever drive or ride in a car without wearing a seat belt? No           Does the patient have evidence of cognitive impairment? No    Asiprin use counseling: Taking ASA appropriately as indicated      Recent Lab Results:    Visual Acuity:  No exam data present    Age-appropriate Screening Schedule:  Refer to the list below for future screening recommendations based on patient's age, sex and/or medical conditions. Orders for these recommended tests are listed in the plan section. The patient has been provided with a written plan.    Health Maintenance   Topic Date Due   • TDAP/TD VACCINES (1 - Tdap) 01/16/1961   • ZOSTER VACCINE (2 of 3) 12/04/2015   • INFLUENZA VACCINE  08/01/2018   • HEMOGLOBIN A1C  03/04/2019   • URINE MICROALBUMIN  03/08/2019   • LIPID PANEL  09/04/2019   • COLONOSCOPY  02/07/2025   • PNEUMOCOCCAL VACCINES (65+ LOW/MEDIUM RISK)  Completed        Subjective   History of Present Illness    Darwin Fraga is a 78 y.o. male who presents for an Annual Wellness Visit.    The following portions of the patient's history were reviewed and updated as appropriate: allergies, current medications, past family history, past medical history, past social history, past surgical history and problem list.    Outpatient Medications Prior to Visit   Medication Sig Dispense Refill   • aspirin 81 MG EC tablet Take 81 mg by mouth Daily.     • Calcium Polycarbophil (FIBERCON PO) Take 2 tablets by mouth Daily.     • Docusate Sodium (STOOL SOFTENER) 100 MG capsule  Take 100 mg by mouth Daily.     • levothyroxine (SYNTHROID, LEVOTHROID) 25 MCG tablet Take 1 tablet by mouth Daily. 90 tablet 3   • naproxen (NAPROSYN) 500 MG tablet Take 1 tablet by mouth Daily As Needed for Mild Pain  or Moderate Pain . 90 tablet 3   • niacin (NIASPAN) 500 MG CR tablet TAKE 1 TABLET EVERY NIGHT 90 tablet 0   • pantoprazole (PROTONIX) 40 MG EC tablet TAKE 1 TABLET DAILY 90 tablet 3   • pioglitazone (ACTOS) 15 MG tablet Take 1 tablet by mouth Daily. 90 tablet 3   • pravastatin (PRAVACHOL) 40 MG tablet TAKE 1 TABLET EVERY NIGHT 90 tablet 3   • Probiotic Product (PROBIOTIC DAILY PO) Take 1 capsule by mouth Daily.     • tamsulosin (FLOMAX) 0.4 MG capsule 24 hr capsule TAKE 1 CAPSULE DAILY 90 capsule 0   • amLODIPine (NORVASC) 10 MG tablet Take 1 tablet by mouth Daily. 90 tablet 3   • folic acid (FOLVITE) 1 MG tablet Take 1 tablet by mouth Daily. 90 tablet 3   • glimepiride (AMARYL) 4 MG tablet Take 1 tablet by mouth 2 (Two) Times a Day. 180 tablet 3   • hydrochlorothiazide (MICROZIDE) 12.5 MG capsule Take 1 capsule by mouth Daily. 90 capsule 3   • JANUMET  MG per tablet Take 1 tablet by mouth 2 (Two) Times a Day. 180 tablet 3   • labetalol (NORMODYNE) 100 MG tablet Take 1 tablet by mouth 2 (Two) Times a Day. 180 tablet 3   • sildenafil (REVATIO) 20 MG tablet Use 1-5 tablets as needed for erectile dysfunction. Do not exceed 100mg in 24 hours. 30 tablet 5   • clobetasol (OLUX) 0.05 % topical foam   5   • Multiple Vitamins-Minerals (PRESERVISION AREDS 2+MULTI VIT) capsule Take 1 capsule by mouth 2 (Two) Times a Day.     • amLODIPine (NORVASC) 10 MG tablet TAKE 1 TABLET DAILY 90 tablet 3   • gabapentin (NEURONTIN) 100 MG capsule Take 100 mg by mouth.       No facility-administered medications prior to visit.        Patient Active Problem List   Diagnosis   • Type 2 diabetes mellitus with complication, without long-term current use of insulin (CMS/HCC)   • Primary osteoarthritis involving multiple  "joints   • Mixed hyperlipidemia   • Essential hypertension   • Benign non-nodular prostatic hyperplasia with lower urinary tract symptoms   • Acquired hypothyroidism   • Gastroesophageal reflux disease without esophagitis   • Coronary artery disease involving native coronary artery of native heart without angina pectoris   • Chronic renal impairment, stage 3 (moderate)   • Macular degeneration of both eyes   • No diabetic retinopathy in both eyes   • Erectile dysfunction associated with type 2 diabetes mellitus (CMS/[facility])       Advance Care Planning:  has an advance directive - a copy HAS NOT been provided    Identification of Risk Factors:  Risk factors include: weight  and cardiovascular risk.    Review of Systems    Compared to one year ago, the patient feels his physical health is the same.  Compared to one year ago, the patient feels his mental health is the same.    Objective     Physical Exam    Vitals:    09/13/18 0856   BP: 128/78   BP Location: Left arm   Patient Position: Sitting   Cuff Size: Adult   Pulse: 79   Temp: 97.4 °F (36.3 °C)   TempSrc: Oral   SpO2: 98%   Weight: 98 kg (216 lb)   Height: 182.9 cm (72\")   PainSc: 0-No pain       Patient's Body mass index is 29.29 kg/m². BMI is above normal parameters. Recommendations include: educational material.      Assessment/Plan   Patient Self-Management and Personalized Health Advice  The patient has been provided with information about: diet, exercise and weight management and preventive services including:   · Exercise counseling provided, Fall Risk assessment done, Nutrition counseling provided, Prostate cancer screening discussed.    Visit Diagnoses:    ICD-10-CM ICD-9-CM   1. Initial Medicare annual wellness visit Z00.00 V70.0   2. Type 2 diabetes mellitus with complication, without long-term current use of insulin (CMS/[facility]) E11.8 250.90   3. Essential hypertension I10 401.9   4. Mixed hyperlipidemia E78.2 272.2   5. Acquired hypothyroidism E03.9 244.9 "   6. Chronic renal impairment, stage 3 (moderate) N18.3 585.3   7. Benign non-nodular prostatic hyperplasia with lower urinary tract symptoms N40.1 600.91   8. Screening for prostate cancer Z12.5 V76.44   9. Erectile dysfunction associated with type 2 diabetes mellitus (CMS/HCC) E11.69 250.80    N52.1 607.84   10. Coronary artery disease involving native coronary artery of native heart without angina pectoris I25.10 414.01       Orders Placed This Encounter   Procedures   • Comprehensive Metabolic Panel     Standing Status:   Future     Standing Expiration Date:   9/13/2019   • Hemoglobin A1c     Standing Status:   Future     Standing Expiration Date:   9/13/2019   • Lipid Panel     Standing Status:   Future     Standing Expiration Date:   9/13/2019   • Microalbumin / Creatinine Urine Ratio - Urine, Clean Catch     Standing Status:   Future     Standing Expiration Date:   9/13/2019   • PSA Screen     Standing Status:   Future     Standing Expiration Date:   9/13/2019   • T4, Free     Standing Status:   Future     Standing Expiration Date:   9/13/2019   • TSH     Standing Status:   Future     Standing Expiration Date:   9/13/2019   • Urinalysis With Culture If Indicated - Urine, Clean Catch     Standing Status:   Future     Standing Expiration Date:   9/13/2019   • CBC & Differential     Standing Status:   Future     Standing Expiration Date:   9/13/2019     Order Specific Question:   Manual Differential     Answer:   No       Outpatient Encounter Prescriptions as of 9/13/2018   Medication Sig Dispense Refill   • amLODIPine (NORVASC) 10 MG tablet Take 1 tablet by mouth Daily. 90 tablet 3   • aspirin 81 MG EC tablet Take 81 mg by mouth Daily.     • Calcium Polycarbophil (FIBERCON PO) Take 2 tablets by mouth Daily.     • Docusate Sodium (STOOL SOFTENER) 100 MG capsule Take 100 mg by mouth Daily.     • folic acid (FOLVITE) 1 MG tablet Take 1 tablet by mouth Daily. 90 tablet 3   • glimepiride (AMARYL) 4 MG tablet Take 1  tablet by mouth Daily. 180 tablet 3   • hydrochlorothiazide (MICROZIDE) 12.5 MG capsule Take 1 capsule by mouth Daily. 90 capsule 3   • JANUMET  MG per tablet Take 1 tablet by mouth 2 (Two) Times a Day. 180 tablet 3   • labetalol (NORMODYNE) 100 MG tablet Take 1 tablet by mouth 2 (Two) Times a Day. 180 tablet 3   • levothyroxine (SYNTHROID, LEVOTHROID) 25 MCG tablet Take 1 tablet by mouth Daily. 90 tablet 3   • naproxen (NAPROSYN) 500 MG tablet Take 1 tablet by mouth Daily As Needed for Mild Pain  or Moderate Pain . 90 tablet 3   • niacin (NIASPAN) 500 MG CR tablet TAKE 1 TABLET EVERY NIGHT 90 tablet 0   • pantoprazole (PROTONIX) 40 MG EC tablet TAKE 1 TABLET DAILY 90 tablet 3   • pioglitazone (ACTOS) 15 MG tablet Take 1 tablet by mouth Daily. 90 tablet 3   • pravastatin (PRAVACHOL) 40 MG tablet TAKE 1 TABLET EVERY NIGHT 90 tablet 3   • Probiotic Product (PROBIOTIC DAILY PO) Take 1 capsule by mouth Daily.     • tamsulosin (FLOMAX) 0.4 MG capsule 24 hr capsule TAKE 1 CAPSULE DAILY 90 capsule 0   • [DISCONTINUED] amLODIPine (NORVASC) 10 MG tablet Take 1 tablet by mouth Daily. 90 tablet 3   • [DISCONTINUED] folic acid (FOLVITE) 1 MG tablet Take 1 tablet by mouth Daily. 90 tablet 3   • [DISCONTINUED] glimepiride (AMARYL) 4 MG tablet Take 1 tablet by mouth 2 (Two) Times a Day. 180 tablet 3   • [DISCONTINUED] hydrochlorothiazide (MICROZIDE) 12.5 MG capsule Take 1 capsule by mouth Daily. 90 capsule 3   • [DISCONTINUED] JANUMET  MG per tablet Take 1 tablet by mouth 2 (Two) Times a Day. 180 tablet 3   • [DISCONTINUED] labetalol (NORMODYNE) 100 MG tablet Take 1 tablet by mouth 2 (Two) Times a Day. 180 tablet 3   • [DISCONTINUED] sildenafil (REVATIO) 20 MG tablet Use 1-5 tablets as needed for erectile dysfunction. Do not exceed 100mg in 24 hours. 30 tablet 5   • clobetasol (OLUX) 0.05 % topical foam   5   • Multiple Vitamins-Minerals (PRESERVISION AREDS 2+MULTI VIT) capsule Take 1 capsule by mouth 2 (Two) Times a  Day.     • tadalafil (CIALIS) 20 MG tablet Take 1 tablet by mouth Every 72 (Seventy-Two) Hours As Needed for erectile dysfunction. 3 tablet 2   • [DISCONTINUED] amLODIPine (NORVASC) 10 MG tablet TAKE 1 TABLET DAILY 90 tablet 3   • [DISCONTINUED] gabapentin (NEURONTIN) 100 MG capsule Take 100 mg by mouth.       No facility-administered encounter medications on file as of 9/13/2018.        Reviewed use of high risk medication in the elderly: yes  Reviewed for potential of harmful drug interactions in the elderly: yes    Follow Up:  Return in about 6 months (around 3/13/2019) for Next scheduled follow up, Or sooner as needed With Labs prior to appointment.     An After Visit Summary and PPPS with all of these plans were given to the patient.

## 2018-11-08 DIAGNOSIS — E78.2 MIXED HYPERLIPIDEMIA: Chronic | ICD-10-CM

## 2018-11-08 RX ORDER — NIACIN 500 MG/1
TABLET, EXTENDED RELEASE ORAL
Qty: 90 TABLET | Refills: 1 | Status: SHIPPED | OUTPATIENT
Start: 2018-11-08 | End: 2019-03-14 | Stop reason: SDUPTHER

## 2019-01-13 DIAGNOSIS — N40.1 BENIGN NON-NODULAR PROSTATIC HYPERPLASIA WITH LOWER URINARY TRACT SYMPTOMS: Chronic | ICD-10-CM

## 2019-01-14 RX ORDER — TAMSULOSIN HYDROCHLORIDE 0.4 MG/1
CAPSULE ORAL
Qty: 90 CAPSULE | Refills: 0 | Status: SHIPPED | OUTPATIENT
Start: 2019-01-14

## 2019-02-27 DIAGNOSIS — E11.8 TYPE 2 DIABETES MELLITUS WITH COMPLICATION, WITHOUT LONG-TERM CURRENT USE OF INSULIN (HCC): Chronic | ICD-10-CM

## 2019-02-28 RX ORDER — PIOGLITAZONEHYDROCHLORIDE 15 MG/1
TABLET ORAL
Qty: 90 TABLET | Refills: 1 | Status: SHIPPED | OUTPATIENT
Start: 2019-02-28 | End: 2019-08-07 | Stop reason: SDUPTHER

## 2019-03-04 ENCOUNTER — LAB (OUTPATIENT)
Dept: LAB | Facility: OTHER | Age: 79
End: 2019-03-04

## 2019-03-04 DIAGNOSIS — E78.2 MIXED HYPERLIPIDEMIA: Chronic | ICD-10-CM

## 2019-03-04 DIAGNOSIS — I10 ESSENTIAL HYPERTENSION: ICD-10-CM

## 2019-03-04 DIAGNOSIS — E11.8 TYPE 2 DIABETES MELLITUS WITH COMPLICATION, WITHOUT LONG-TERM CURRENT USE OF INSULIN (HCC): Chronic | ICD-10-CM

## 2019-03-04 DIAGNOSIS — E03.9 ACQUIRED HYPOTHYROIDISM: Chronic | ICD-10-CM

## 2019-03-04 LAB
ALBUMIN SERPL-MCNC: 4.2 G/DL (ref 3.5–5)
ALBUMIN UR-MCNC: 1.5 MG/L
ALBUMIN/GLOB SERPL: 1.4 G/DL (ref 1.1–1.8)
ALP SERPL-CCNC: 89 U/L (ref 38–126)
ALT SERPL W P-5'-P-CCNC: 18 U/L
ANION GAP SERPL CALCULATED.3IONS-SCNC: 10 MMOL/L (ref 5–15)
AST SERPL-CCNC: 20 U/L (ref 17–59)
BASOPHILS # BLD AUTO: 0.04 10*3/MM3 (ref 0–0.2)
BASOPHILS NFR BLD AUTO: 0.5 % (ref 0–2)
BILIRUB SERPL-MCNC: 1.2 MG/DL (ref 0.2–1.3)
BILIRUB UR QL STRIP: NEGATIVE
BUN BLD-MCNC: 21 MG/DL (ref 9–20)
BUN/CREAT SERPL: 15.9 (ref 7–25)
CALCIUM SPEC-SCNC: 9.3 MG/DL (ref 8.4–10.2)
CHLORIDE SERPL-SCNC: 101 MMOL/L (ref 98–107)
CHOLEST SERPL-MCNC: 134 MG/DL (ref 150–200)
CLARITY UR: CLEAR
CO2 SERPL-SCNC: 28 MMOL/L (ref 22–30)
COLOR UR: YELLOW
CREAT BLD-MCNC: 1.32 MG/DL (ref 0.66–1.25)
CREAT UR-MCNC: 191.6 MG/DL
DEPRECATED RDW RBC AUTO: 44.9 FL (ref 35.1–43.9)
EOSINOPHIL # BLD AUTO: 0.32 10*3/MM3 (ref 0–0.7)
EOSINOPHIL NFR BLD AUTO: 3.9 % (ref 0–7)
ERYTHROCYTE [DISTWIDTH] IN BLOOD BY AUTOMATED COUNT: 14 % (ref 11.5–14.5)
GFR SERPL CREATININE-BSD FRML MDRD: 53 ML/MIN/1.73 (ref 42–98)
GLOBULIN UR ELPH-MCNC: 2.9 GM/DL (ref 2.3–3.5)
GLUCOSE BLD-MCNC: 149 MG/DL (ref 74–99)
GLUCOSE UR STRIP-MCNC: NEGATIVE MG/DL
HBA1C MFR BLD: 6.9 % (ref 4–5.6)
HCT VFR BLD AUTO: 42.8 % (ref 39–49)
HDLC SERPL-MCNC: 36 MG/DL (ref 40–59)
HGB BLD-MCNC: 13.8 G/DL (ref 13.7–17.3)
HGB UR QL STRIP.AUTO: NEGATIVE
KETONES UR QL STRIP: NEGATIVE
LDLC SERPL CALC-MCNC: 74 MG/DL
LDLC/HDLC SERPL: 2.04 {RATIO} (ref 0–3.55)
LEUKOCYTE ESTERASE UR QL STRIP.AUTO: NEGATIVE
LYMPHOCYTES # BLD AUTO: 1.85 10*3/MM3 (ref 0.6–4.2)
LYMPHOCYTES NFR BLD AUTO: 22.5 % (ref 10–50)
MCH RBC QN AUTO: 28.5 PG (ref 26.5–34)
MCHC RBC AUTO-ENTMCNC: 32.2 G/DL (ref 31.5–36.3)
MCV RBC AUTO: 88.2 FL (ref 80–98)
MICROALBUMIN/CREAT UR: 7.8 MG/G (ref 0–30)
MONOCYTES # BLD AUTO: 1.09 10*3/MM3 (ref 0–0.9)
MONOCYTES NFR BLD AUTO: 13.2 % (ref 0–12)
NEUTROPHILS # BLD AUTO: 4.94 10*3/MM3 (ref 2–8.6)
NEUTROPHILS NFR BLD AUTO: 59.9 % (ref 37–80)
NITRITE UR QL STRIP: NEGATIVE
PH UR STRIP.AUTO: 5.5 [PH] (ref 5.5–8)
PLATELET # BLD AUTO: 223 10*3/MM3 (ref 150–450)
PMV BLD AUTO: 11.1 FL (ref 8–12)
POTASSIUM BLD-SCNC: 4.3 MMOL/L (ref 3.4–5)
PROT SERPL-MCNC: 7.1 G/DL (ref 6.3–8.2)
PROT UR QL STRIP: NEGATIVE
RBC # BLD AUTO: 4.85 10*6/MM3 (ref 4.37–5.74)
SODIUM BLD-SCNC: 139 MMOL/L (ref 137–145)
SP GR UR STRIP: 1.02 (ref 1–1.03)
T4 FREE SERPL-MCNC: 1.11 NG/DL (ref 0.78–2.19)
TRIGL SERPL-MCNC: 122 MG/DL
TSH SERPL DL<=0.05 MIU/L-ACNC: 3.59 MIU/ML (ref 0.46–4.68)
UROBILINOGEN UR QL STRIP: NORMAL
VLDLC SERPL-MCNC: 24.4 MG/DL
WBC NRBC COR # BLD: 8.24 10*3/MM3 (ref 3.2–9.8)

## 2019-03-04 PROCEDURE — 82570 ASSAY OF URINE CREATININE: CPT | Performed by: INTERNAL MEDICINE

## 2019-03-04 PROCEDURE — 80061 LIPID PANEL: CPT | Performed by: INTERNAL MEDICINE

## 2019-03-04 PROCEDURE — 80053 COMPREHEN METABOLIC PANEL: CPT | Performed by: INTERNAL MEDICINE

## 2019-03-04 PROCEDURE — 81003 URINALYSIS AUTO W/O SCOPE: CPT | Performed by: INTERNAL MEDICINE

## 2019-03-04 PROCEDURE — 85025 COMPLETE CBC W/AUTO DIFF WBC: CPT | Performed by: INTERNAL MEDICINE

## 2019-03-04 PROCEDURE — 82043 UR ALBUMIN QUANTITATIVE: CPT | Performed by: INTERNAL MEDICINE

## 2019-03-04 PROCEDURE — 83036 HEMOGLOBIN GLYCOSYLATED A1C: CPT | Performed by: INTERNAL MEDICINE

## 2019-03-04 PROCEDURE — 84439 ASSAY OF FREE THYROXINE: CPT | Performed by: INTERNAL MEDICINE

## 2019-03-04 PROCEDURE — 36415 COLL VENOUS BLD VENIPUNCTURE: CPT | Performed by: INTERNAL MEDICINE

## 2019-03-04 PROCEDURE — 84443 ASSAY THYROID STIM HORMONE: CPT | Performed by: INTERNAL MEDICINE

## 2019-03-11 ENCOUNTER — LAB (OUTPATIENT)
Dept: LAB | Facility: OTHER | Age: 79
End: 2019-03-11

## 2019-03-11 DIAGNOSIS — Z12.5 SCREENING FOR PROSTATE CANCER: ICD-10-CM

## 2019-03-11 LAB — PSA SERPL-MCNC: 2.33 NG/ML (ref 0–4)

## 2019-03-11 PROCEDURE — G0103 PSA SCREENING: HCPCS | Performed by: INTERNAL MEDICINE

## 2019-03-11 PROCEDURE — 36415 COLL VENOUS BLD VENIPUNCTURE: CPT | Performed by: INTERNAL MEDICINE

## 2019-03-14 ENCOUNTER — OFFICE VISIT (OUTPATIENT)
Dept: FAMILY MEDICINE CLINIC | Facility: CLINIC | Age: 79
End: 2019-03-14

## 2019-03-14 VITALS
HEIGHT: 72 IN | SYSTOLIC BLOOD PRESSURE: 120 MMHG | DIASTOLIC BLOOD PRESSURE: 72 MMHG | BODY MASS INDEX: 27.36 KG/M2 | HEART RATE: 76 BPM | TEMPERATURE: 98.4 F | WEIGHT: 202 LBS

## 2019-03-14 DIAGNOSIS — M15.9 PRIMARY OSTEOARTHRITIS INVOLVING MULTIPLE JOINTS: Chronic | ICD-10-CM

## 2019-03-14 DIAGNOSIS — N18.30 CHRONIC RENAL IMPAIRMENT, STAGE 3 (MODERATE) (HCC): Chronic | ICD-10-CM

## 2019-03-14 DIAGNOSIS — E03.9 ACQUIRED HYPOTHYROIDISM: Chronic | ICD-10-CM

## 2019-03-14 DIAGNOSIS — N40.1 BENIGN NON-NODULAR PROSTATIC HYPERPLASIA WITH LOWER URINARY TRACT SYMPTOMS: Chronic | ICD-10-CM

## 2019-03-14 DIAGNOSIS — E78.2 MIXED HYPERLIPIDEMIA: Chronic | ICD-10-CM

## 2019-03-14 DIAGNOSIS — E11.8 TYPE 2 DIABETES MELLITUS WITH COMPLICATION, WITHOUT LONG-TERM CURRENT USE OF INSULIN (HCC): Primary | Chronic | ICD-10-CM

## 2019-03-14 DIAGNOSIS — K21.9 GASTROESOPHAGEAL REFLUX DISEASE WITHOUT ESOPHAGITIS: Chronic | ICD-10-CM

## 2019-03-14 DIAGNOSIS — I10 ESSENTIAL HYPERTENSION: Chronic | ICD-10-CM

## 2019-03-14 PROCEDURE — 99214 OFFICE O/P EST MOD 30 MIN: CPT | Performed by: INTERNAL MEDICINE

## 2019-03-14 RX ORDER — NIACIN 500 MG/1
500 TABLET, EXTENDED RELEASE ORAL NIGHTLY
Qty: 90 TABLET | Refills: 3 | Status: SHIPPED | OUTPATIENT
Start: 2019-03-14 | End: 2020-05-12

## 2019-03-14 RX ORDER — NAPROXEN 500 MG/1
500 TABLET ORAL DAILY PRN
Qty: 90 TABLET | Refills: 3 | Status: SHIPPED | OUTPATIENT
Start: 2019-03-14 | End: 2020-02-06

## 2019-03-14 RX ORDER — LEVOTHYROXINE SODIUM 0.03 MG/1
25 TABLET ORAL DAILY
Qty: 90 TABLET | Refills: 3 | Status: SHIPPED | OUTPATIENT
Start: 2019-03-14 | End: 2020-05-12

## 2019-03-14 RX ORDER — PANTOPRAZOLE SODIUM 40 MG/1
40 TABLET, DELAYED RELEASE ORAL DAILY
Qty: 90 TABLET | Refills: 3 | Status: SHIPPED | OUTPATIENT
Start: 2019-03-14 | End: 2020-05-12

## 2019-03-14 NOTE — PROGRESS NOTES
Chief Complaint   Patient presents with   • Diabetes     6 mo f/u with labs   • Hypertension     Subjective   Darwin Fraga is a 79 y.o. male who presents to the office for follow-up and review of labs. He has diabetes and his blood sugar has been controlled. He is tolerating his medications without any side effects.  He has hypertension and his blood pressure has been controlled.  He takes Synthroid daily for treatment of hypothyroidism.  He has osteoarthritis and takes naproxen as needed.  He uses this sparingly secondary to renal insufficiency. He has a history of coronary artery disease and had a mild heart attack a number of years ago.  He continues to follow with cardiology for this.  He has BPH and takes Flomax daily.  He denies any urinary symptoms.  He has GERD which is well controlled with Protonix. He has diabetic peripheral neuropathy, but does not require medication for this. He denies any significant neuropathic pain.    The following portions of the patient's history were reviewed and updated as appropriate: allergies, current medications, past family history, past medical history, past social history, past surgical history and problem list.    Review of Systems   Constitutional: Negative for chills, fatigue and fever.   HENT: Negative for congestion, sneezing, sore throat and trouble swallowing.    Eyes: Negative for visual disturbance.   Respiratory: Negative for cough, chest tightness, shortness of breath and wheezing.    Cardiovascular: Negative for chest pain, palpitations and leg swelling.   Gastrointestinal: Negative for abdominal pain, constipation, diarrhea, nausea and vomiting.   Genitourinary: Negative for dysuria, frequency and urgency.   Musculoskeletal: Negative for neck pain.   Skin: Negative for rash.   Neurological: Negative for dizziness, weakness and headaches.   Psychiatric/Behavioral:        Patient denies any feelings of depression and has not felt down, hopeless or lost  "interest in any activities.   All other systems reviewed and are negative.      Objective   Vitals:    03/14/19 0904   BP: 120/72   BP Location: Left arm   Patient Position: Sitting   Cuff Size: Adult   Pulse: 76   Temp: 98.4 °F (36.9 °C)   TempSrc: Oral   Weight: 91.6 kg (202 lb)   Height: 182.9 cm (72\")   PainSc: 0-No pain     Physical Exam   Constitutional: He is oriented to person, place, and time. He appears well-developed and well-nourished. No distress.   HENT:   Head: Normocephalic and atraumatic.   Nose: Nose normal.   Mouth/Throat: Oropharynx is clear and moist. No oropharyngeal exudate.   Eyes: Conjunctivae and EOM are normal. Pupils are equal, round, and reactive to light. No scleral icterus.   Neck: Normal range of motion. Neck supple.   Cardiovascular: Normal rate, regular rhythm and normal heart sounds. Exam reveals no gallop and no friction rub.   No murmur heard.  Pulmonary/Chest: Effort normal and breath sounds normal. No respiratory distress. He has no wheezes. He has no rales.   Abdominal: Soft. Bowel sounds are normal. He exhibits no distension. There is no tenderness. There is no rebound and no guarding.   Musculoskeletal: Normal range of motion. He exhibits no edema.   Lymphadenopathy:     He has no cervical adenopathy.   Neurological: He is alert and oriented to person, place, and time. No cranial nerve deficit.   Skin: Skin is warm and dry. No rash noted.   Psychiatric: He has a normal mood and affect. His behavior is normal. Judgment and thought content normal.   Nursing note and vitals reviewed.      Assessment/Plan   Darwin was seen today for diabetes and hypertension.    Diagnoses and all orders for this visit:    Type 2 diabetes mellitus with complication, without long-term current use of insulin (CMS/[facility])  -     Hemoglobin A1c; Future    Essential hypertension  -     CBC & Differential; Future  -     Comprehensive Metabolic Panel; Future  -     Urinalysis With Culture If Indicated - " Urine, Clean Catch; Future    Mixed hyperlipidemia  -     LDL Cholesterol, Direct; Future  -     niacin (NIASPAN) 500 MG CR tablet; Take 1 tablet by mouth Every Night.    Acquired hypothyroidism  -     T4, Free; Future  -     TSH; Future  -     levothyroxine (SYNTHROID, LEVOTHROID) 25 MCG tablet; Take 1 tablet by mouth Daily.    Chronic renal impairment, stage 3 (moderate) (CMS/HCC)    Benign non-nodular prostatic hyperplasia with lower urinary tract symptoms    Primary osteoarthritis involving multiple joints  -     naproxen (NAPROSYN) 500 MG tablet; Take 1 tablet by mouth Daily As Needed for Mild Pain  or Moderate Pain .    Gastroesophageal reflux disease without esophagitis  -     pantoprazole (PROTONIX) 40 MG EC tablet; Take 1 tablet by mouth Daily.         Labs are reviewed with patient. His diabetes is controlled, with a HbgA1C of 6.9. He may monitor blood sugar one time daily.  He will continue with his current diabetic medication.  His creatinine is elevated but baseline at 1.32.  He does not currently take an ACE inhibitor secondary to his creatinine. His GFR is 53.  His lipids are at goal, and he will continue with pravastatin and niacin.  His TSH is normal, and he will continue with the current dose of Synthroid.  His blood pressure is well-controlled.  He will continue with Flomax for treatment of the BPH.     PHQ-2/PHQ-9 Depression Screening 3/14/2019   Little interest or pleasure in doing things 0   Feeling down, depressed, or hopeless 0   Trouble falling or staying asleep, or sleeping too much -   Feeling tired or having little energy -   Poor appetite or overeating -   Feeling bad about yourself - or that you are a failure or have let yourself or your family down -   Trouble concentrating on things, such as reading the newspaper or watching television -   Moving or speaking so slowly that other people could have noticed. Or the opposite - being so fidgety or restless that you have been moving around  a lot more than usual -   Thoughts that you would be better off dead, or of hurting yourself in some way -   Total Score 0         Lab on 03/11/2019   Component Date Value Ref Range Status   • PSA 03/11/2019 2.330  0.000 - 4.000 ng/mL Final   Lab on 03/04/2019   Component Date Value Ref Range Status   • Microalbumin/Creatinine Ratio 03/04/2019 7.8  0.0 - 30.0 mg/g Final   • Creatinine, Urine 03/04/2019 191.6  mg/dL Final   • Microalbumin, Urine 03/04/2019 1.5  mg/L Final   • Color, UA 03/04/2019 Yellow  Yellow, Straw Final   • Appearance, UA 03/04/2019 Clear  Clear Final   • pH, UA 03/04/2019 5.5  5.5 - 8.0 Final   • Specific Gravity, UA 03/04/2019 1.025  1.005 - 1.030 Final   • Glucose, UA 03/04/2019 Negative  Negative Final   • Ketones, UA 03/04/2019 Negative  Negative Final   • Bilirubin, UA 03/04/2019 Negative  Negative Final   • Blood, UA 03/04/2019 Negative  Negative Final   • Protein, UA 03/04/2019 Negative  Negative Final   • Leuk Esterase, UA 03/04/2019 Negative  Negative Final   • Nitrite, UA 03/04/2019 Negative  Negative Final   • Urobilinogen, UA 03/04/2019 0.2 E.U./dL  0.2 - 1.0 E.U./dL Final   • Glucose 03/04/2019 149* 74 - 99 mg/dL Final   • BUN 03/04/2019 21* 9 - 20 mg/dL Final   • Creatinine 03/04/2019 1.32* 0.66 - 1.25 mg/dL Final   • Sodium 03/04/2019 139  137 - 145 mmol/L Final   • Potassium 03/04/2019 4.3  3.4 - 5.0 mmol/L Final   • Chloride 03/04/2019 101  98 - 107 mmol/L Final   • CO2 03/04/2019 28.0  22.0 - 30.0 mmol/L Final   • Calcium 03/04/2019 9.3  8.4 - 10.2 mg/dL Final   • Total Protein 03/04/2019 7.1  6.3 - 8.2 g/dL Final   • Albumin 03/04/2019 4.20  3.50 - 5.00 g/dL Final   • ALT (SGPT) 03/04/2019 18  <=50 U/L Final   • AST (SGOT) 03/04/2019 20  17 - 59 U/L Final   • Alkaline Phosphatase 03/04/2019 89  38 - 126 U/L Final   • Total Bilirubin 03/04/2019 1.2  0.2 - 1.3 mg/dL Final   • eGFR Non African Amer 03/04/2019 53  42 - 98 mL/min/1.73 Final   • Globulin 03/04/2019 2.9  2.3 - 3.5  gm/dL Final   • A/G Ratio 03/04/2019 1.4  1.1 - 1.8 g/dL Final   • BUN/Creatinine Ratio 03/04/2019 15.9  7.0 - 25.0 Final   • Anion Gap 03/04/2019 10.0  5.0 - 15.0 mmol/L Final   • Hemoglobin A1C 03/04/2019 6.9* 4 - 5.6 % Final   • Total Cholesterol 03/04/2019 134* 150 - 200 mg/dL Final   • Triglycerides 03/04/2019 122  <=150 mg/dL Final   • HDL Cholesterol 03/04/2019 36* 40 - 59 mg/dL Final   • LDL Cholesterol  03/04/2019 74  <=100 mg/dL Final   • VLDL Cholesterol 03/04/2019 24.4  mg/dL Final   • LDL/HDL Ratio 03/04/2019 2.04  0.00 - 3.55 Final   • Free T4 03/04/2019 1.11  0.78 - 2.19 ng/dL Final   • TSH 03/04/2019 3.590  0.460 - 4.680 mIU/mL Final   • WBC 03/04/2019 8.24  3.20 - 9.80 10*3/mm3 Final   • RBC 03/04/2019 4.85  4.37 - 5.74 10*6/mm3 Final   • Hemoglobin 03/04/2019 13.8  13.7 - 17.3 g/dL Final   • Hematocrit 03/04/2019 42.8  39.0 - 49.0 % Final   • MCV 03/04/2019 88.2  80.0 - 98.0 fL Final   • MCH 03/04/2019 28.5  26.5 - 34.0 pg Final   • MCHC 03/04/2019 32.2  31.5 - 36.3 g/dL Final   • RDW 03/04/2019 14.0  11.5 - 14.5 % Final   • RDW-SD 03/04/2019 44.9* 35.1 - 43.9 fl Final   • MPV 03/04/2019 11.1  8.0 - 12.0 fL Final   • Platelets 03/04/2019 223  150 - 450 10*3/mm3 Final   • Neutrophil % 03/04/2019 59.9  37.0 - 80.0 % Final   • Lymphocyte % 03/04/2019 22.5  10.0 - 50.0 % Final   • Monocyte % 03/04/2019 13.2* 0.0 - 12.0 % Final   • Eosinophil % 03/04/2019 3.9  0.0 - 7.0 % Final   • Basophil % 03/04/2019 0.5  0.0 - 2.0 % Final   • Neutrophils, Absolute 03/04/2019 4.94  2.00 - 8.60 10*3/mm3 Final   • Lymphocytes, Absolute 03/04/2019 1.85  0.60 - 4.20 10*3/mm3 Final   • Monocytes, Absolute 03/04/2019 1.09* 0.00 - 0.90 10*3/mm3 Final   • Eosinophils, Absolute 03/04/2019 0.32  0.00 - 0.70 10*3/mm3 Final   • Basophils, Absolute 03/04/2019 0.04  0.00 - 0.20 10*3/mm3 Final   ]        .

## 2019-08-07 DIAGNOSIS — E11.8 TYPE 2 DIABETES MELLITUS WITH COMPLICATION, WITHOUT LONG-TERM CURRENT USE OF INSULIN (HCC): Chronic | ICD-10-CM

## 2019-08-08 RX ORDER — PIOGLITAZONEHYDROCHLORIDE 15 MG/1
TABLET ORAL
Qty: 90 TABLET | Refills: 0 | Status: SHIPPED | OUTPATIENT
Start: 2019-08-08 | End: 2019-09-16 | Stop reason: SDUPTHER

## 2019-09-09 ENCOUNTER — LAB (OUTPATIENT)
Dept: LAB | Facility: OTHER | Age: 79
End: 2019-09-09

## 2019-09-09 DIAGNOSIS — I10 ESSENTIAL HYPERTENSION: ICD-10-CM

## 2019-09-09 DIAGNOSIS — E03.9 ACQUIRED HYPOTHYROIDISM: Chronic | ICD-10-CM

## 2019-09-09 DIAGNOSIS — E11.8 TYPE 2 DIABETES MELLITUS WITH COMPLICATION, WITHOUT LONG-TERM CURRENT USE OF INSULIN (HCC): Chronic | ICD-10-CM

## 2019-09-09 DIAGNOSIS — E78.2 MIXED HYPERLIPIDEMIA: Chronic | ICD-10-CM

## 2019-09-09 LAB
ALBUMIN SERPL-MCNC: 4.3 G/DL (ref 3.5–5)
ALBUMIN/GLOB SERPL: 1.5 G/DL (ref 1.1–1.8)
ALP SERPL-CCNC: 95 U/L (ref 38–126)
ALT SERPL W P-5'-P-CCNC: 19 U/L
ANION GAP SERPL CALCULATED.3IONS-SCNC: 12 MMOL/L (ref 5–15)
AST SERPL-CCNC: 23 U/L (ref 17–59)
BASOPHILS # BLD AUTO: 0.03 10*3/MM3 (ref 0–0.2)
BASOPHILS NFR BLD AUTO: 0.3 % (ref 0–1.5)
BILIRUB SERPL-MCNC: 0.7 MG/DL (ref 0.2–1.3)
BILIRUB UR QL STRIP: NEGATIVE
BUN BLD-MCNC: 27 MG/DL (ref 7–23)
BUN/CREAT SERPL: 17.8 (ref 7–25)
CALCIUM SPEC-SCNC: 9.4 MG/DL (ref 8.4–10.2)
CHLORIDE SERPL-SCNC: 105 MMOL/L (ref 101–112)
CLARITY UR: CLEAR
CO2 SERPL-SCNC: 26 MMOL/L (ref 22–30)
COLOR UR: YELLOW
CREAT BLD-MCNC: 1.52 MG/DL (ref 0.7–1.3)
DEPRECATED RDW RBC AUTO: 44.1 FL (ref 37–54)
EOSINOPHIL # BLD AUTO: 0.4 10*3/MM3 (ref 0–0.4)
EOSINOPHIL NFR BLD AUTO: 4.5 % (ref 0.3–6.2)
ERYTHROCYTE [DISTWIDTH] IN BLOOD BY AUTOMATED COUNT: 13.9 % (ref 12.3–15.4)
GFR SERPL CREATININE-BSD FRML MDRD: 45 ML/MIN/1.73 (ref 42–98)
GLOBULIN UR ELPH-MCNC: 2.9 GM/DL (ref 2.3–3.5)
GLUCOSE BLD-MCNC: 124 MG/DL (ref 70–99)
GLUCOSE UR STRIP-MCNC: NEGATIVE MG/DL
HCT VFR BLD AUTO: 42 % (ref 37.5–51)
HGB BLD-MCNC: 13.8 G/DL (ref 13–17.7)
HGB UR QL STRIP.AUTO: NEGATIVE
KETONES UR QL STRIP: NEGATIVE
LEUKOCYTE ESTERASE UR QL STRIP.AUTO: NEGATIVE
LYMPHOCYTES # BLD AUTO: 1.96 10*3/MM3 (ref 0.7–3.1)
LYMPHOCYTES NFR BLD AUTO: 22.2 % (ref 19.6–45.3)
MCH RBC QN AUTO: 29.4 PG (ref 26.6–33)
MCHC RBC AUTO-ENTMCNC: 32.9 G/DL (ref 31.5–35.7)
MCV RBC AUTO: 89.6 FL (ref 79–97)
MONOCYTES # BLD AUTO: 1.13 10*3/MM3 (ref 0.1–0.9)
MONOCYTES NFR BLD AUTO: 12.8 % (ref 5–12)
NEUTROPHILS # BLD AUTO: 5.3 10*3/MM3 (ref 1.7–7)
NEUTROPHILS NFR BLD AUTO: 60.2 % (ref 42.7–76)
NITRITE UR QL STRIP: NEGATIVE
PH UR STRIP.AUTO: 5.5 [PH] (ref 5.5–8)
PLATELET # BLD AUTO: 259 10*3/MM3 (ref 140–450)
PMV BLD AUTO: 10.7 FL (ref 6–12)
POTASSIUM BLD-SCNC: 3.7 MMOL/L (ref 3.4–5)
PROT SERPL-MCNC: 7.2 G/DL (ref 6.3–8.6)
PROT UR QL STRIP: NEGATIVE
RBC # BLD AUTO: 4.69 10*6/MM3 (ref 4.14–5.8)
SODIUM BLD-SCNC: 143 MMOL/L (ref 137–145)
SP GR UR STRIP: 1.02 (ref 1–1.03)
UROBILINOGEN UR QL STRIP: NORMAL
WBC NRBC COR # BLD: 8.82 10*3/MM3 (ref 3.4–10.8)

## 2019-09-09 PROCEDURE — 85025 COMPLETE CBC W/AUTO DIFF WBC: CPT | Performed by: INTERNAL MEDICINE

## 2019-09-09 PROCEDURE — 36415 COLL VENOUS BLD VENIPUNCTURE: CPT | Performed by: INTERNAL MEDICINE

## 2019-09-09 PROCEDURE — 83721 ASSAY OF BLOOD LIPOPROTEIN: CPT | Performed by: INTERNAL MEDICINE

## 2019-09-09 PROCEDURE — 80053 COMPREHEN METABOLIC PANEL: CPT | Performed by: INTERNAL MEDICINE

## 2019-09-09 PROCEDURE — 84443 ASSAY THYROID STIM HORMONE: CPT | Performed by: INTERNAL MEDICINE

## 2019-09-09 PROCEDURE — 81003 URINALYSIS AUTO W/O SCOPE: CPT | Performed by: INTERNAL MEDICINE

## 2019-09-09 PROCEDURE — 84439 ASSAY OF FREE THYROXINE: CPT | Performed by: INTERNAL MEDICINE

## 2019-09-10 LAB
ARTICHOKE IGE QN: 81 MG/DL (ref 0–100)
T4 FREE SERPL-MCNC: 1.57 NG/DL (ref 0.93–1.7)
TSH SERPL DL<=0.05 MIU/L-ACNC: 3.37 UIU/ML (ref 0.27–4.2)

## 2019-09-11 DIAGNOSIS — E78.2 MIXED HYPERLIPIDEMIA: Chronic | ICD-10-CM

## 2019-09-16 ENCOUNTER — OFFICE VISIT (OUTPATIENT)
Dept: FAMILY MEDICINE CLINIC | Facility: CLINIC | Age: 79
End: 2019-09-16

## 2019-09-16 VITALS
TEMPERATURE: 98.4 F | WEIGHT: 200 LBS | HEART RATE: 79 BPM | BODY MASS INDEX: 27.09 KG/M2 | DIASTOLIC BLOOD PRESSURE: 74 MMHG | HEIGHT: 72 IN | SYSTOLIC BLOOD PRESSURE: 144 MMHG

## 2019-09-16 DIAGNOSIS — I25.10 CORONARY ARTERY DISEASE INVOLVING NATIVE CORONARY ARTERY OF NATIVE HEART WITHOUT ANGINA PECTORIS: Chronic | ICD-10-CM

## 2019-09-16 DIAGNOSIS — E78.2 MIXED HYPERLIPIDEMIA: Chronic | ICD-10-CM

## 2019-09-16 DIAGNOSIS — Z12.5 SCREENING FOR PROSTATE CANCER: ICD-10-CM

## 2019-09-16 DIAGNOSIS — K21.9 GASTROESOPHAGEAL REFLUX DISEASE WITHOUT ESOPHAGITIS: Chronic | ICD-10-CM

## 2019-09-16 DIAGNOSIS — N18.30 CHRONIC RENAL IMPAIRMENT, STAGE 3 (MODERATE) (HCC): Chronic | ICD-10-CM

## 2019-09-16 DIAGNOSIS — E11.8 TYPE 2 DIABETES MELLITUS WITH COMPLICATION, WITHOUT LONG-TERM CURRENT USE OF INSULIN (HCC): Primary | Chronic | ICD-10-CM

## 2019-09-16 DIAGNOSIS — E03.9 ACQUIRED HYPOTHYROIDISM: Chronic | ICD-10-CM

## 2019-09-16 DIAGNOSIS — I10 ESSENTIAL HYPERTENSION: Chronic | ICD-10-CM

## 2019-09-16 PROCEDURE — 99214 OFFICE O/P EST MOD 30 MIN: CPT | Performed by: INTERNAL MEDICINE

## 2019-09-16 RX ORDER — AMLODIPINE BESYLATE 10 MG/1
10 TABLET ORAL DAILY
Qty: 90 TABLET | Refills: 3 | Status: SHIPPED | OUTPATIENT
Start: 2019-09-16 | End: 2020-09-25

## 2019-09-16 RX ORDER — HYDROCHLOROTHIAZIDE 12.5 MG/1
12.5 CAPSULE, GELATIN COATED ORAL DAILY
Qty: 90 CAPSULE | Refills: 3 | Status: SHIPPED | OUTPATIENT
Start: 2019-09-16 | End: 2020-05-12

## 2019-09-16 RX ORDER — PIOGLITAZONEHYDROCHLORIDE 15 MG/1
15 TABLET ORAL DAILY
Qty: 90 TABLET | Refills: 3 | Status: SHIPPED | OUTPATIENT
Start: 2019-09-16 | End: 2021-03-01 | Stop reason: SDUPTHER

## 2019-09-16 RX ORDER — LABETALOL 200 MG/1
200 TABLET, FILM COATED ORAL 2 TIMES DAILY
Qty: 180 TABLET | Refills: 3 | Status: SHIPPED | OUTPATIENT
Start: 2019-09-16 | End: 2020-08-03

## 2019-09-16 RX ORDER — PRAVASTATIN SODIUM 40 MG
TABLET ORAL
Qty: 90 TABLET | Refills: 4 | Status: SHIPPED | OUTPATIENT
Start: 2019-09-16 | End: 2020-10-26

## 2019-09-16 RX ORDER — ISOSORBIDE MONONITRATE 60 MG/1
60 TABLET, EXTENDED RELEASE ORAL DAILY
COMMUNITY
End: 2020-05-12

## 2019-09-16 NOTE — PROGRESS NOTES
Chief Complaint   Patient presents with   • Hospital Discharge Follow-Up     [facility], Heart attack, pace maker insertion   • Diabetes     6 mo f/u with labs     Subjective   Darwin Fraga is a 79 y.o. male who presents to the office for follow-up and review of labs. He has diabetes and his blood sugar has been controlled. He is tolerating his medications without any side effects.  He has hypertension and his blood pressure has been controlled.  He takes Synthroid daily for treatment of hypothyroidism.  He has osteoarthritis and takes naproxen as needed.  He uses this sparingly secondary to renal insufficiency.  He has BPH and takes Flomax daily.  He denies any urinary symptoms.  He has GERD which is well controlled with Protonix. He has diabetic peripheral neuropathy, but does not require medication for this. He denies any significant neuropathic pain.     He has coronary artery disease and had a mild heart attack a number of years ago.  He was also recently admitted to the hospital with chest pain and had a minimal elevation in his troponin at 1.0.  He had a heart catheterization which did show the presence of coronary artery disease.  He also had a pacemaker placed.  He had a consultation with cardiothoracic surgery.  It was felt that this was best managed medically.  The coronary artery disease was in the small vessels and distal to what could be managed by stenting or bypass.  He continues to follow with cardiology for this.  He was started on Imdur for medical management of the coronary artery disease.    While in the hospital, his TSH was slightly elevated.  He was told to stay on the same dose of Synthroid, however when he was discharged he received a prescription for the higher 50 mcg daily dose.  He has not started taking this dose, and is still taking 25 mcg daily.    The following portions of the patient's history were reviewed and updated as appropriate: allergies, current medications, past family  "history, past medical history, past social history, past surgical history and problem list.    Review of Systems   Constitutional: Negative for chills, fatigue and fever.   HENT: Negative for congestion, sneezing, sore throat and trouble swallowing.    Eyes: Negative for visual disturbance.   Respiratory: Negative for cough, chest tightness, shortness of breath and wheezing.    Cardiovascular: Negative for chest pain, palpitations and leg swelling.   Gastrointestinal: Negative for abdominal pain, constipation, diarrhea, nausea and vomiting.   Genitourinary: Negative for dysuria, frequency and urgency.   Musculoskeletal: Negative for neck pain.   Skin: Negative for rash.   Neurological: Negative for dizziness, weakness and headaches.   Psychiatric/Behavioral:        Patient denies any feelings of depression and has not felt down, hopeless or lost interest in any activities.   All other systems reviewed and are negative.      Objective   Vitals:    09/16/19 1033   BP: 144/74   BP Location: Left arm   Patient Position: Sitting   Cuff Size: Adult   Pulse: 79   Temp: 98.4 °F (36.9 °C)   TempSrc: Oral   Weight: 90.7 kg (200 lb)   Height: 182.9 cm (72\")   PainSc: 0-No pain     Physical Exam   Constitutional: He is oriented to person, place, and time. He appears well-developed and well-nourished. No distress.   HENT:   Head: Normocephalic and atraumatic.   Nose: Nose normal.   Mouth/Throat: Oropharynx is clear and moist. No oropharyngeal exudate.   Eyes: Conjunctivae and EOM are normal. Pupils are equal, round, and reactive to light. No scleral icterus.   Neck: Normal range of motion. Neck supple.   Cardiovascular: Normal rate, regular rhythm and normal heart sounds. Exam reveals no gallop and no friction rub.   No murmur heard.  Pulmonary/Chest: Effort normal and breath sounds normal. No respiratory distress. He has no wheezes. He has no rales.   Abdominal: Soft. Bowel sounds are normal. He exhibits no distension. There is " no tenderness. There is no rebound and no guarding.   Musculoskeletal: Normal range of motion. He exhibits no edema.   Lymphadenopathy:     He has no cervical adenopathy.   Neurological: He is alert and oriented to person, place, and time. No cranial nerve deficit.   Skin: Skin is warm and dry. No rash noted.   Psychiatric: He has a normal mood and affect. His behavior is normal. Judgment and thought content normal.   Nursing note and vitals reviewed.      Assessment/Plan   Darwin was seen today for hospital discharge follow-up and diabetes.    Diagnoses and all orders for this visit:    Type 2 diabetes mellitus with complication, without long-term current use of insulin (CMS/[facility])  -     Hemoglobin A1c; Future  -     pioglitazone (ACTOS) 15 MG tablet; Take 1 tablet by mouth Daily.  -     ONE TOUCH LANCETS misc; 1 each Daily.  -     glucose blood test strip; Use as instructed to check blood sugar one daily. Pt has True Read Monitor    Essential hypertension  -     CBC & Differential; Future  -     Comprehensive Metabolic Panel; Future  -     Urinalysis With Culture If Indicated -; Future  -     labetalol (NORMODYNE) 200 MG tablet; Take 1 tablet by mouth 2 (Two) Times a Day.  -     amLODIPine (NORVASC) 10 MG tablet; Take 1 tablet by mouth Daily.  -     hydrochlorothiazide (MICROZIDE) 12.5 MG capsule; Take 1 capsule by mouth Daily.    Mixed hyperlipidemia  -     Lipid Panel; Future    Chronic renal impairment, stage 3 (moderate) (CMS/[facility])    Acquired hypothyroidism  -     T4, Free; Future  -     TSH; Future    Gastroesophageal reflux disease without esophagitis    Coronary artery disease involving native coronary artery of native heart without angina pectoris    Screening for prostate cancer  -     PSA Screen; Future         Labs are reviewed with patient.  His glucose is 124.  His most recent hemoglobin A1c on 8/7/2019 is 6.9.  His diabetes is controlled. He may monitor blood sugar one time daily.  He will continue  with his current diabetic medication.  His creatinine is slightly elevated at 1.52.  He does not currently take an ACE inhibitor secondary to his creatinine. His GFR is 45.  His LDL is at goal, and he will continue with pravastatin and niacin.  His TSH is normal, and he will continue with the current 25 mcg dose of Synthroid.  He will continue with Flomax for treatment of the BPH.  He will continue to follow with cardiology secondary to the coronary artery disease.  He will continue with Protonix for treatment of GERD.  His blood pressure is slightly elevated today.  His labetalol was recently increased, so I will hold off on any additional changes in his blood pressure medication.  He will continue to monitor it at home.    He has erectile dysfunction and has previously been taking Cialis as needed.  Now that he is taking Imdur, he may no longer use Cialis.  I will discontinue this medication.  I have informed him that he should not take any of the prescription erectile dysfunction medications due to potential interaction with the Imdur.    Current outpatient and discharge medications have been reconciled for the patient.  Reviewed by: Serg Dale MD    PHQ-2/PHQ-9 Depression Screening 9/16/2019   Little interest or pleasure in doing things 0   Feeling down, depressed, or hopeless 0   Trouble falling or staying asleep, or sleeping too much -   Feeling tired or having little energy -   Poor appetite or overeating -   Feeling bad about yourself - or that you are a failure or have let yourself or your family down -   Trouble concentrating on things, such as reading the newspaper or watching television -   Moving or speaking so slowly that other people could have noticed. Or the opposite - being so fidgety or restless that you have been moving around a lot more than usual -   Thoughts that you would be better off dead, or of hurting yourself in some way -   Total Score 0         Lab on 09/09/2019   Component Date  Value Ref Range Status   • Glucose 09/09/2019 124* 70 - 99 mg/dL Final   • BUN 09/09/2019 27* 7 - 23 mg/dL Final   • Creatinine 09/09/2019 1.52* 0.70 - 1.30 mg/dL Final   • Sodium 09/09/2019 143  137 - 145 mmol/L Final   • Potassium 09/09/2019 3.7  3.4 - 5.0 mmol/L Final   • Chloride 09/09/2019 105  101 - 112 mmol/L Final   • CO2 09/09/2019 26.0  22.0 - 30.0 mmol/L Final   • Calcium 09/09/2019 9.4  8.4 - 10.2 mg/dL Final   • Total Protein 09/09/2019 7.2  6.3 - 8.6 g/dL Final   • Albumin 09/09/2019 4.30  3.50 - 5.00 g/dL Final   • ALT (SGPT) 09/09/2019 19  <=50 U/L Final   • AST (SGOT) 09/09/2019 23  17 - 59 U/L Final   • Alkaline Phosphatase 09/09/2019 95  38 - 126 U/L Final   • Total Bilirubin 09/09/2019 0.7  0.2 - 1.3 mg/dL Final   • eGFR Non African Amer 09/09/2019 45  42 - 98 mL/min/1.73 Final   • Globulin 09/09/2019 2.9  2.3 - 3.5 gm/dL Final   • A/G Ratio 09/09/2019 1.5  1.1 - 1.8 g/dL Final   • BUN/Creatinine Ratio 09/09/2019 17.8  7.0 - 25.0 Final   • Anion Gap 09/09/2019 12.0  5.0 - 15.0 mmol/L Final   • Free T4 09/09/2019 1.57  0.93 - 1.70 ng/dL Final   • TSH 09/09/2019 3.370  0.270 - 4.200 uIU/mL Final   • Color, UA 09/09/2019 Yellow  Yellow, Straw Final   • Appearance, UA 09/09/2019 Clear  Clear Final   • pH, UA 09/09/2019 5.5  5.5 - 8.0 Final   • Specific Gravity, UA 09/09/2019 1.020  1.005 - 1.030 Final   • Glucose, UA 09/09/2019 Negative  Negative Final   • Ketones, UA 09/09/2019 Negative  Negative Final   • Bilirubin, UA 09/09/2019 Negative  Negative Final   • Blood, UA 09/09/2019 Negative  Negative Final   • Protein, UA 09/09/2019 Negative  Negative Final   • Leuk Esterase, UA 09/09/2019 Negative  Negative Final   • Nitrite, UA 09/09/2019 Negative  Negative Final   • Urobilinogen, UA 09/09/2019 0.2 E.U./dL  0.2 - 1.0 E.U./dL Final   • LDL Cholesterol  09/09/2019 81  0 - 100 mg/dL Final   • WBC 09/09/2019 8.82  3.40 - 10.80 10*3/mm3 Final   • RBC 09/09/2019 4.69  4.14 - 5.80 10*6/mm3 Final   •  Hemoglobin 09/09/2019 13.8  13.0 - 17.7 g/dL Final   • Hematocrit 09/09/2019 42.0  37.5 - 51.0 % Final   • MCV 09/09/2019 89.6  79.0 - 97.0 fL Final   • MCH 09/09/2019 29.4  26.6 - 33.0 pg Final   • MCHC 09/09/2019 32.9  31.5 - 35.7 g/dL Final   • RDW 09/09/2019 13.9  12.3 - 15.4 % Final   • RDW-SD 09/09/2019 44.1  37.0 - 54.0 fl Final   • MPV 09/09/2019 10.7  6.0 - 12.0 fL Final   • Platelets 09/09/2019 259  140 - 450 10*3/mm3 Final   • Neutrophil % 09/09/2019 60.2  42.7 - 76.0 % Final   • Lymphocyte % 09/09/2019 22.2  19.6 - 45.3 % Final   • Monocyte % 09/09/2019 12.8* 5.0 - 12.0 % Final   • Eosinophil % 09/09/2019 4.5  0.3 - 6.2 % Final   • Basophil % 09/09/2019 0.3  0.0 - 1.5 % Final   • Neutrophils, Absolute 09/09/2019 5.30  1.70 - 7.00 10*3/mm3 Final   • Lymphocytes, Absolute 09/09/2019 1.96  0.70 - 3.10 10*3/mm3 Final   • Monocytes, Absolute 09/09/2019 1.13* 0.10 - 0.90 10*3/mm3 Final   • Eosinophils, Absolute 09/09/2019 0.40  0.00 - 0.40 10*3/mm3 Final   • Basophils, Absolute 09/09/2019 0.03  0.00 - 0.20 10*3/mm3 Final   ]        .

## 2019-09-17 DIAGNOSIS — E11.8 TYPE 2 DIABETES MELLITUS WITH COMPLICATION, WITHOUT LONG-TERM CURRENT USE OF INSULIN (HCC): Chronic | ICD-10-CM

## 2019-09-17 RX ORDER — BLOOD-GLUCOSE METER
1 KIT MISCELLANEOUS DAILY
Qty: 1 EACH | Refills: 0 | Status: SHIPPED | OUTPATIENT
Start: 2019-09-17

## 2019-09-17 RX ORDER — LANCETS 30 GAUGE
EACH MISCELLANEOUS
Qty: 100 EACH | Refills: 3 | Status: SHIPPED | OUTPATIENT
Start: 2019-09-17 | End: 2022-06-06

## 2019-10-08 DIAGNOSIS — E11.8 TYPE 2 DIABETES MELLITUS WITH COMPLICATION, WITHOUT LONG-TERM CURRENT USE OF INSULIN (HCC): Chronic | ICD-10-CM

## 2019-10-08 DIAGNOSIS — I25.10 CORONARY ARTERY DISEASE INVOLVING NATIVE CORONARY ARTERY OF NATIVE HEART WITHOUT ANGINA PECTORIS: Chronic | ICD-10-CM

## 2019-10-08 RX ORDER — SITAGLIPTIN AND METFORMIN HYDROCHLORIDE 500; 50 MG/1; MG/1
TABLET, FILM COATED ORAL
Qty: 180 TABLET | Refills: 1 | Status: SHIPPED | OUTPATIENT
Start: 2019-10-08 | End: 2020-03-19

## 2019-10-08 RX ORDER — FOLIC ACID 1 MG/1
TABLET ORAL
Qty: 90 TABLET | Refills: 1 | Status: SHIPPED | OUTPATIENT
Start: 2019-10-08 | End: 2020-03-19

## 2019-10-08 RX ORDER — GLIMEPIRIDE 4 MG/1
TABLET ORAL
Qty: 180 TABLET | Refills: 1 | Status: SHIPPED | OUTPATIENT
Start: 2019-10-08 | End: 2020-03-19

## 2019-12-09 ENCOUNTER — OFFICE VISIT (OUTPATIENT)
Dept: FAMILY MEDICINE CLINIC | Facility: CLINIC | Age: 79
End: 2019-12-09

## 2019-12-09 VITALS
DIASTOLIC BLOOD PRESSURE: 68 MMHG | WEIGHT: 202 LBS | TEMPERATURE: 97.5 F | BODY MASS INDEX: 27.36 KG/M2 | SYSTOLIC BLOOD PRESSURE: 134 MMHG | HEIGHT: 72 IN | HEART RATE: 84 BPM

## 2019-12-09 DIAGNOSIS — J40 BRONCHITIS: Primary | ICD-10-CM

## 2019-12-09 DIAGNOSIS — J01.00 ACUTE NON-RECURRENT MAXILLARY SINUSITIS: ICD-10-CM

## 2019-12-09 PROCEDURE — 99213 OFFICE O/P EST LOW 20 MIN: CPT | Performed by: INTERNAL MEDICINE

## 2019-12-09 PROCEDURE — 96372 THER/PROPH/DIAG INJ SC/IM: CPT | Performed by: INTERNAL MEDICINE

## 2019-12-09 RX ORDER — METHYLPREDNISOLONE ACETATE 80 MG/ML
80 INJECTION, SUSPENSION INTRA-ARTICULAR; INTRALESIONAL; INTRAMUSCULAR; SOFT TISSUE ONCE
Status: COMPLETED | OUTPATIENT
Start: 2019-12-09 | End: 2019-12-09

## 2019-12-09 RX ORDER — CEFDINIR 300 MG/1
300 CAPSULE ORAL 2 TIMES DAILY
Qty: 20 CAPSULE | Refills: 0 | Status: SHIPPED | OUTPATIENT
Start: 2019-12-09 | End: 2019-12-19

## 2019-12-09 RX ADMIN — METHYLPREDNISOLONE ACETATE 80 MG: 80 INJECTION, SUSPENSION INTRA-ARTICULAR; INTRALESIONAL; INTRAMUSCULAR; SOFT TISSUE at 11:34

## 2019-12-09 NOTE — PROGRESS NOTES
"Chief Complaint   Patient presents with   • Cough     x 1 week   • Sore Throat     Subjective   Darwin Fraga is a 79 y.o. male who presents to the office complaining of nasal congestion, sinus pressure and a sore throat which has been present for the past week.  He has also had a cough productive of a thick sputum.  He denies any fever or chills.     The following portions of the patient's history were reviewed and updated as appropriate: allergies, current medications, past family history, past medical history, past social history, past surgical history and problem list.    Review of Systems   Constitutional: Negative for chills, fatigue and fever.   HENT: Positive for congestion, sneezing and sore throat. Negative for trouble swallowing.    Eyes: Negative for visual disturbance.   Respiratory: Positive for cough. Negative for chest tightness, shortness of breath and wheezing.    Cardiovascular: Negative for chest pain, palpitations and leg swelling.   Gastrointestinal: Negative for abdominal pain, constipation, diarrhea, nausea and vomiting.   Genitourinary: Negative for dysuria, frequency and urgency.   Musculoskeletal: Negative for neck pain.   Skin: Negative for rash.   Neurological: Negative for dizziness, weakness and headaches.   Psychiatric/Behavioral:        Patient denies any feelings of depression and has not felt down, hopeless or lost interest in any activities.   All other systems reviewed and are negative.      Objective   Vitals:    12/09/19 1115   BP: 134/68   BP Location: Left arm   Patient Position: Sitting   Cuff Size: Adult   Pulse: 84   Temp: 97.5 °F (36.4 °C)   TempSrc: Oral   Weight: 91.6 kg (202 lb)   Height: 182.9 cm (72\")   PainSc: 0-No pain     Body mass index is 27.4 kg/m².  Physical Exam   Constitutional: He is oriented to person, place, and time. He appears well-developed and well-nourished. No distress.   HENT:   Head: Normocephalic and atraumatic.   Nose: Right sinus exhibits " maxillary sinus tenderness. Left sinus exhibits maxillary sinus tenderness.   Mouth/Throat: Posterior oropharyngeal erythema present. No oropharyngeal exudate.   Nose is congested   Eyes: Pupils are equal, round, and reactive to light. Conjunctivae and EOM are normal. No scleral icterus.   Neck: Normal range of motion. Neck supple.   Cardiovascular: Normal rate, regular rhythm and normal heart sounds. Exam reveals no gallop and no friction rub.   No murmur heard.  Pulmonary/Chest: Effort normal and breath sounds normal. No respiratory distress. He has no wheezes. He has no rales.   Abdominal: Soft. Bowel sounds are normal. He exhibits no distension. There is no tenderness. There is no rebound and no guarding.   Musculoskeletal: Normal range of motion. He exhibits no edema.   Lymphadenopathy:     He has no cervical adenopathy.   Neurological: He is alert and oriented to person, place, and time. No cranial nerve deficit.   Skin: Skin is warm and dry. No rash noted.   Psychiatric: He has a normal mood and affect. His behavior is normal. Judgment and thought content normal.   Nursing note and vitals reviewed.      Assessment/Plan   Darwin was seen today for cough and sore throat.    Diagnoses and all orders for this visit:    Bronchitis  -     cefdinir (OMNICEF) 300 MG capsule; Take 1 capsule by mouth 2 (Two) Times a Day for 10 days.  -     methylPREDNISolone acetate (DEPO-medrol) injection 80 mg    Acute non-recurrent maxillary sinusitis  -     cefdinir (OMNICEF) 300 MG capsule; Take 1 capsule by mouth 2 (Two) Times a Day for 10 days.  -     methylPREDNISolone acetate (DEPO-medrol) injection 80 mg    He is given Omnicef for treatment of the bronchitis and sinusitis.  He is also given Depo-Medrol 80 mg IM.  He may use Mucinex to help with congestion.         PHQ-2/PHQ-9 Depression Screening 12/9/2019   Little interest or pleasure in doing things 0   Feeling down, depressed, or hopeless 0   Trouble falling or staying  asleep, or sleeping too much -   Feeling tired or having little energy -   Poor appetite or overeating -   Feeling bad about yourself - or that you are a failure or have let yourself or your family down -   Trouble concentrating on things, such as reading the newspaper or watching television -   Moving or speaking so slowly that other people could have noticed. Or the opposite - being so fidgety or restless that you have been moving around a lot more than usual -   Thoughts that you would be better off dead, or of hurting yourself in some way -   Total Score 0

## 2020-02-04 DIAGNOSIS — M15.9 PRIMARY OSTEOARTHRITIS INVOLVING MULTIPLE JOINTS: Chronic | ICD-10-CM

## 2020-02-06 RX ORDER — NAPROXEN 500 MG/1
TABLET ORAL
Qty: 90 TABLET | Refills: 1 | Status: SHIPPED | OUTPATIENT
Start: 2020-02-06 | End: 2020-08-03

## 2020-03-12 ENCOUNTER — LAB (OUTPATIENT)
Dept: LAB | Facility: OTHER | Age: 80
End: 2020-03-12

## 2020-03-12 DIAGNOSIS — E03.9 ACQUIRED HYPOTHYROIDISM: Chronic | ICD-10-CM

## 2020-03-12 DIAGNOSIS — E78.2 MIXED HYPERLIPIDEMIA: Chronic | ICD-10-CM

## 2020-03-12 DIAGNOSIS — Z12.5 SCREENING FOR PROSTATE CANCER: ICD-10-CM

## 2020-03-12 DIAGNOSIS — E11.8 TYPE 2 DIABETES MELLITUS WITH COMPLICATION, WITHOUT LONG-TERM CURRENT USE OF INSULIN (HCC): Chronic | ICD-10-CM

## 2020-03-12 DIAGNOSIS — I10 ESSENTIAL HYPERTENSION: ICD-10-CM

## 2020-03-12 LAB
ALBUMIN SERPL-MCNC: 4.1 G/DL (ref 3.5–5)
ALBUMIN/GLOB SERPL: 1.4 G/DL (ref 1.1–1.8)
ALP SERPL-CCNC: 68 U/L (ref 38–126)
ALT SERPL W P-5'-P-CCNC: 18 U/L
ANION GAP SERPL CALCULATED.3IONS-SCNC: 10 MMOL/L (ref 5–15)
AST SERPL-CCNC: 22 U/L (ref 17–59)
BASOPHILS # BLD AUTO: 0.04 10*3/MM3 (ref 0–0.2)
BASOPHILS NFR BLD AUTO: 0.5 % (ref 0–1.5)
BILIRUB SERPL-MCNC: 0.7 MG/DL (ref 0.2–1.3)
BILIRUB UR QL STRIP: NEGATIVE
BUN BLD-MCNC: 21 MG/DL (ref 7–23)
BUN/CREAT SERPL: 15.6 (ref 7–25)
CALCIUM SPEC-SCNC: 9.2 MG/DL (ref 8.4–10.2)
CHLORIDE SERPL-SCNC: 105 MMOL/L (ref 101–112)
CHOLEST SERPL-MCNC: 143 MG/DL (ref 150–200)
CLARITY UR: CLEAR
CO2 SERPL-SCNC: 28 MMOL/L (ref 22–30)
COLOR UR: YELLOW
CREAT BLD-MCNC: 1.35 MG/DL (ref 0.7–1.3)
DEPRECATED RDW RBC AUTO: 47.8 FL (ref 37–54)
EOSINOPHIL # BLD AUTO: 0.36 10*3/MM3 (ref 0–0.4)
EOSINOPHIL NFR BLD AUTO: 4.1 % (ref 0.3–6.2)
ERYTHROCYTE [DISTWIDTH] IN BLOOD BY AUTOMATED COUNT: 15 % (ref 12.3–15.4)
GFR SERPL CREATININE-BSD FRML MDRD: 51 ML/MIN/1.73 (ref 42–98)
GLOBULIN UR ELPH-MCNC: 3 GM/DL (ref 2.3–3.5)
GLUCOSE BLD-MCNC: 89 MG/DL (ref 70–99)
GLUCOSE UR STRIP-MCNC: NEGATIVE MG/DL
HCT VFR BLD AUTO: 44.5 % (ref 37.5–51)
HDLC SERPL-MCNC: 35 MG/DL (ref 40–59)
HGB BLD-MCNC: 14.2 G/DL (ref 13–17.7)
HGB UR QL STRIP.AUTO: NEGATIVE
KETONES UR QL STRIP: NEGATIVE
LDLC SERPL CALC-MCNC: 81 MG/DL
LDLC/HDLC SERPL: 2.33 {RATIO} (ref 0–3.55)
LEUKOCYTE ESTERASE UR QL STRIP.AUTO: NEGATIVE
LYMPHOCYTES # BLD AUTO: 2.02 10*3/MM3 (ref 0.7–3.1)
LYMPHOCYTES NFR BLD AUTO: 22.9 % (ref 19.6–45.3)
MCH RBC QN AUTO: 28.3 PG (ref 26.6–33)
MCHC RBC AUTO-ENTMCNC: 31.9 G/DL (ref 31.5–35.7)
MCV RBC AUTO: 88.6 FL (ref 79–97)
MONOCYTES # BLD AUTO: 1.15 10*3/MM3 (ref 0.1–0.9)
MONOCYTES NFR BLD AUTO: 13.1 % (ref 5–12)
NEUTROPHILS # BLD AUTO: 5.24 10*3/MM3 (ref 1.7–7)
NEUTROPHILS NFR BLD AUTO: 59.4 % (ref 42.7–76)
NITRITE UR QL STRIP: NEGATIVE
PH UR STRIP.AUTO: 7 [PH] (ref 5.5–8)
PLATELET # BLD AUTO: 213 10*3/MM3 (ref 140–450)
PMV BLD AUTO: 11.6 FL (ref 6–12)
POTASSIUM BLD-SCNC: 4.4 MMOL/L (ref 3.4–5)
PROT SERPL-MCNC: 7.1 G/DL (ref 6.3–8.6)
PROT UR QL STRIP: ABNORMAL
RBC # BLD AUTO: 5.02 10*6/MM3 (ref 4.14–5.8)
SODIUM BLD-SCNC: 143 MMOL/L (ref 137–145)
SP GR UR STRIP: 1.01 (ref 1–1.03)
TRIGL SERPL-MCNC: 133 MG/DL
UROBILINOGEN UR QL STRIP: ABNORMAL
VLDLC SERPL-MCNC: 26.6 MG/DL
WBC NRBC COR # BLD: 8.81 10*3/MM3 (ref 3.4–10.8)

## 2020-03-12 PROCEDURE — 80061 LIPID PANEL: CPT | Performed by: INTERNAL MEDICINE

## 2020-03-12 PROCEDURE — 80053 COMPREHEN METABOLIC PANEL: CPT | Performed by: INTERNAL MEDICINE

## 2020-03-12 PROCEDURE — 84439 ASSAY OF FREE THYROXINE: CPT | Performed by: INTERNAL MEDICINE

## 2020-03-12 PROCEDURE — 81003 URINALYSIS AUTO W/O SCOPE: CPT | Performed by: INTERNAL MEDICINE

## 2020-03-12 PROCEDURE — 84443 ASSAY THYROID STIM HORMONE: CPT | Performed by: INTERNAL MEDICINE

## 2020-03-12 PROCEDURE — G0103 PSA SCREENING: HCPCS | Performed by: INTERNAL MEDICINE

## 2020-03-12 PROCEDURE — 85025 COMPLETE CBC W/AUTO DIFF WBC: CPT | Performed by: INTERNAL MEDICINE

## 2020-03-12 PROCEDURE — 36415 COLL VENOUS BLD VENIPUNCTURE: CPT | Performed by: INTERNAL MEDICINE

## 2020-03-12 PROCEDURE — 83036 HEMOGLOBIN GLYCOSYLATED A1C: CPT | Performed by: INTERNAL MEDICINE

## 2020-03-13 LAB
HBA1C MFR BLD: 6.5 % (ref 4.8–5.6)
PSA SERPL-MCNC: 2.12 NG/ML (ref 0–4)
T4 FREE SERPL-MCNC: 1.42 NG/DL (ref 0.93–1.7)
TSH SERPL DL<=0.05 MIU/L-ACNC: 2.49 UIU/ML (ref 0.27–4.2)

## 2020-03-17 DIAGNOSIS — I25.10 CORONARY ARTERY DISEASE INVOLVING NATIVE CORONARY ARTERY OF NATIVE HEART WITHOUT ANGINA PECTORIS: Chronic | ICD-10-CM

## 2020-03-17 DIAGNOSIS — E11.8 TYPE 2 DIABETES MELLITUS WITH COMPLICATION, WITHOUT LONG-TERM CURRENT USE OF INSULIN (HCC): Chronic | ICD-10-CM

## 2020-03-19 RX ORDER — SITAGLIPTIN AND METFORMIN HYDROCHLORIDE 500; 50 MG/1; MG/1
TABLET, FILM COATED ORAL
Qty: 180 TABLET | Refills: 0 | Status: SHIPPED | OUTPATIENT
Start: 2020-03-19 | End: 2020-06-11

## 2020-03-19 RX ORDER — FOLIC ACID 1 MG/1
TABLET ORAL
Qty: 90 TABLET | Refills: 0 | Status: SHIPPED | OUTPATIENT
Start: 2020-03-19 | End: 2020-06-11

## 2020-03-19 RX ORDER — GLIMEPIRIDE 4 MG/1
TABLET ORAL
Qty: 180 TABLET | Refills: 0 | Status: SHIPPED | OUTPATIENT
Start: 2020-03-19 | End: 2020-06-11

## 2020-03-19 NOTE — TELEPHONE ENCOUNTER
Last office visit 9-17-19. Next office visit was scheduled for 3-20-20 but due to Covid 19 epidemic, this will be rescheduled for a later time.

## 2020-05-11 DIAGNOSIS — E03.9 ACQUIRED HYPOTHYROIDISM: Chronic | ICD-10-CM

## 2020-05-11 DIAGNOSIS — K21.9 GASTROESOPHAGEAL REFLUX DISEASE WITHOUT ESOPHAGITIS: Chronic | ICD-10-CM

## 2020-05-12 ENCOUNTER — OFFICE VISIT (OUTPATIENT)
Dept: FAMILY MEDICINE CLINIC | Facility: CLINIC | Age: 80
End: 2020-05-12

## 2020-05-12 VITALS — DIASTOLIC BLOOD PRESSURE: 81 MMHG | SYSTOLIC BLOOD PRESSURE: 139 MMHG | HEART RATE: 83 BPM

## 2020-05-12 DIAGNOSIS — E11.8 TYPE 2 DIABETES MELLITUS WITH COMPLICATION, WITHOUT LONG-TERM CURRENT USE OF INSULIN (HCC): Primary | Chronic | ICD-10-CM

## 2020-05-12 DIAGNOSIS — N40.1 BENIGN NON-NODULAR PROSTATIC HYPERPLASIA WITH LOWER URINARY TRACT SYMPTOMS: Chronic | ICD-10-CM

## 2020-05-12 DIAGNOSIS — E03.9 ACQUIRED HYPOTHYROIDISM: Chronic | ICD-10-CM

## 2020-05-12 DIAGNOSIS — E78.2 MIXED HYPERLIPIDEMIA: Chronic | ICD-10-CM

## 2020-05-12 DIAGNOSIS — N18.30 CHRONIC RENAL IMPAIRMENT, STAGE 3 (MODERATE) (HCC): Chronic | ICD-10-CM

## 2020-05-12 DIAGNOSIS — I10 ESSENTIAL HYPERTENSION: Chronic | ICD-10-CM

## 2020-05-12 PROCEDURE — 99443 PR PHYS/QHP TELEPHONE EVALUATION 21-30 MIN: CPT | Performed by: INTERNAL MEDICINE

## 2020-05-12 RX ORDER — PANTOPRAZOLE SODIUM 40 MG/1
TABLET, DELAYED RELEASE ORAL
Qty: 90 TABLET | Refills: 3 | Status: SHIPPED | OUTPATIENT
Start: 2020-05-12 | End: 2021-04-15

## 2020-05-12 RX ORDER — LEVOTHYROXINE SODIUM 0.03 MG/1
TABLET ORAL
Qty: 90 TABLET | Refills: 3 | Status: SHIPPED | OUTPATIENT
Start: 2020-05-12 | End: 2021-04-15

## 2020-05-12 NOTE — PROGRESS NOTES
{     Telemedicine visit    You have chosen to receive care through a telephone visit. Do you consent to use a telephone visit for your medical care today? Yes      Chief Complaint   Patient presents with   • Diabetes     6 mo f/u w labs   • Hypertension     Subjective   Darwin Fraga is a 80 y.o. male who is seen via telephone visit for follow-up and review of labs. He has diabetes and his blood sugar has been controlled. He is tolerating his medications without any side effects.  He has hypertension and his blood pressure has been controlled.  He takes Synthroid daily for treatment of hypothyroidism.  He has osteoarthritis and takes naproxen as needed.  He uses this sparingly secondary to renal insufficiency.  He has BPH and takes Flomax daily.  He denies any urinary symptoms.  He has GERD which is well controlled with Protonix. He has diabetic peripheral neuropathy, but does not require medication for this. He denies any significant neuropathic pain.  He has coronary artery disease which is managed medically.  He follows with cardiology.    The following portions of the patient's history were reviewed and updated as appropriate: allergies, current medications, past family history, past medical history, past social history, past surgical history and problem list.    Review of Systems   Constitutional: Negative for chills, fatigue and fever.   HENT: Negative for congestion, sneezing, sore throat and trouble swallowing.    Eyes: Negative for visual disturbance.   Respiratory: Negative for cough, chest tightness, shortness of breath and wheezing.    Cardiovascular: Negative for chest pain, palpitations and leg swelling.   Gastrointestinal: Negative for abdominal pain, constipation, diarrhea, nausea and vomiting.   Genitourinary: Negative for dysuria, frequency and urgency.   Musculoskeletal: Negative for neck pain.   Skin: Negative for rash.   Neurological: Negative for dizziness, weakness and headaches.    Psychiatric/Behavioral:        Patient denies any feelings of depression and has not felt down, hopeless or lost interest in any activities.   All other systems reviewed and are negative.      Objective    Vitals:    05/12/20 1039   BP: 139/81  Comment: Patient Reported   Pulse: 83  Comment: Patient Reported       Physical Exam   Constitutional: He is oriented to person, place, and time. He appears well-developed and well-nourished. No distress.   HENT:   Head: Normocephalic and atraumatic.   Right Ear: Hearing and external ear normal.   Left Ear: Hearing and external ear normal.   Nose: Nose normal.   Eyes: Pupils are equal, round, and reactive to light. EOM are normal. Right eye exhibits no discharge. Left eye exhibits no discharge.   Neck: Normal range of motion.   Pulmonary/Chest: Effort normal.   Neurological: He is alert and oriented to person, place, and time. No cranial nerve deficit.   Psychiatric: He has a normal mood and affect. His behavior is normal. Judgment and thought content normal.       Assessment/Plan             Darwin was seen today for diabetes and hypertension.    Diagnoses and all orders for this visit:    Type 2 diabetes mellitus with complication, without long-term current use of insulin (CMS/[facility])  -     Hemoglobin A1c; Future  -     Microalbumin / Creatinine Urine Ratio - Urine, Clean Catch; Future    Essential hypertension  -     CBC & Differential; Future  -     Comprehensive Metabolic Panel; Future  -     Urinalysis With Culture If Indicated -; Future    Mixed hyperlipidemia  -     LDL Cholesterol, Direct; Future    Acquired hypothyroidism  -     T4, Free; Future  -     TSH; Future    Benign non-nodular prostatic hyperplasia with lower urinary tract symptoms    Chronic renal impairment, stage 3 (moderate) (CMS/[facility])         Labs are reviewed with patient. His glucose is 89.  His most recent hemoglobin A1c on 8/7/2019 is 6.50.  His diabetes is controlled. He may monitor blood sugar one  time daily.  He will continue with his current diabetic medication.  His creatinine is slightly elevated, but baseline at 1.35.  He does not currently take an ACE inhibitor secondary to his creatinine. His GFR is 51.  His lipids are at goal, and he will continue with pravastatin and niacin.  His TSH is normal, and he will continue with the current 25 mcg dose of Synthroid.  He will continue with Flomax for treatment of the BPH.  He will continue to follow with cardiology secondary to the coronary artery disease.  He will continue with Protonix for treatment of GERD.  His blood pressure is controlled, and he will continue with his current blood pressure medication.    ACP Discussion Status: ACP discussion was held with the patient during this visit. Patient has an advance directive (not in EMR), copy requested.    This visit has been rescheduled as a phone visit to comply with patient safety concerns in accordance with CDC recommendations. Total time of discussion was 25 minutes.      PHQ-2/PHQ-9 Depression Screening 5/12/2020   Little interest or pleasure in doing things 0   Feeling down, depressed, or hopeless 0   Trouble falling or staying asleep, or sleeping too much -   Feeling tired or having little energy -   Poor appetite or overeating -   Feeling bad about yourself - or that you are a failure or have let yourself or your family down -   Trouble concentrating on things, such as reading the newspaper or watching television -   Moving or speaking so slowly that other people could have noticed. Or the opposite - being so fidgety or restless that you have been moving around a lot more than usual -   Thoughts that you would be better off dead, or of hurting yourself in some way -   Total Score 0       Lab on 03/12/2020   Component Date Value Ref Range Status   • Glucose 03/12/2020 89  70 - 99 mg/dL Final   • BUN 03/12/2020 21  7 - 23 mg/dL Final   • Creatinine 03/12/2020 1.35* 0.70 - 1.30 mg/dL Final   • Sodium  03/12/2020 143  137 - 145 mmol/L Final   • Potassium 03/12/2020 4.4  3.4 - 5.0 mmol/L Final   • Chloride 03/12/2020 105  101 - 112 mmol/L Final   • CO2 03/12/2020 28.0  22.0 - 30.0 mmol/L Final   • Calcium 03/12/2020 9.2  8.4 - 10.2 mg/dL Final   • Total Protein 03/12/2020 7.1  6.3 - 8.6 g/dL Final   • Albumin 03/12/2020 4.10  3.50 - 5.00 g/dL Final   • ALT (SGPT) 03/12/2020 18  <=50 U/L Final   • AST (SGOT) 03/12/2020 22  17 - 59 U/L Final   • Alkaline Phosphatase 03/12/2020 68  38 - 126 U/L Final   • Total Bilirubin 03/12/2020 0.7  0.2 - 1.3 mg/dL Final   • eGFR Non African Amer 03/12/2020 51  42 - 98 mL/min/1.73 Final   • Globulin 03/12/2020 3.0  2.3 - 3.5 gm/dL Final   • A/G Ratio 03/12/2020 1.4  1.1 - 1.8 g/dL Final   • BUN/Creatinine Ratio 03/12/2020 15.6  7.0 - 25.0 Final   • Anion Gap 03/12/2020 10.0  5.0 - 15.0 mmol/L Final   • Free T4 03/12/2020 1.42  0.93 - 1.70 ng/dL Final   • TSH 03/12/2020 2.490  0.270 - 4.200 uIU/mL Final   • Color, UA 03/12/2020 Yellow  Yellow, Straw Final   • Appearance, UA 03/12/2020 Clear  Clear Final   • pH, UA 03/12/2020 7.0  5.5 - 8.0 Final   • Specific Gravity, UA 03/12/2020 1.015  1.005 - 1.030 Final   • Glucose, UA 03/12/2020 Negative  Negative Final   • Ketones, UA 03/12/2020 Negative  Negative Final   • Bilirubin, UA 03/12/2020 Negative  Negative Final   • Blood, UA 03/12/2020 Negative  Negative Final   • Protein, UA 03/12/2020 Trace* Negative Final   • Leuk Esterase, UA 03/12/2020 Negative  Negative Final   • Nitrite, UA 03/12/2020 Negative  Negative Final   • Urobilinogen, UA 03/12/2020 0.2 E.U./dL  0.2 - 1.0 E.U./dL Final   • Hemoglobin A1C 03/12/2020 6.50* 4.80 - 5.60 % Final   • Total Cholesterol 03/12/2020 143* 150 - 200 mg/dL Final   • Triglycerides 03/12/2020 133  <=150 mg/dL Final   • HDL Cholesterol 03/12/2020 35* 40 - 59 mg/dL Final   • LDL Cholesterol  03/12/2020 81  <=100 mg/dL Final   • VLDL Cholesterol 03/12/2020 26.6  mg/dL Final   • LDL/HDL Ratio  03/12/2020 2.33  0.00 - 3.55 Final   • PSA 03/12/2020 2.120  0.000 - 4.000 ng/mL Final   • WBC 03/12/2020 8.81  3.40 - 10.80 10*3/mm3 Final   • RBC 03/12/2020 5.02  4.14 - 5.80 10*6/mm3 Final   • Hemoglobin 03/12/2020 14.2  13.0 - 17.7 g/dL Final   • Hematocrit 03/12/2020 44.5  37.5 - 51.0 % Final   • MCV 03/12/2020 88.6  79.0 - 97.0 fL Final   • MCH 03/12/2020 28.3  26.6 - 33.0 pg Final   • MCHC 03/12/2020 31.9  31.5 - 35.7 g/dL Final   • RDW 03/12/2020 15.0  12.3 - 15.4 % Final   • RDW-SD 03/12/2020 47.8  37.0 - 54.0 fl Final   • MPV 03/12/2020 11.6  6.0 - 12.0 fL Final   • Platelets 03/12/2020 213  140 - 450 10*3/mm3 Final   • Neutrophil % 03/12/2020 59.4  42.7 - 76.0 % Final   • Lymphocyte % 03/12/2020 22.9  19.6 - 45.3 % Final   • Monocyte % 03/12/2020 13.1* 5.0 - 12.0 % Final   • Eosinophil % 03/12/2020 4.1  0.3 - 6.2 % Final   • Basophil % 03/12/2020 0.5  0.0 - 1.5 % Final   • Neutrophils, Absolute 03/12/2020 5.24  1.70 - 7.00 10*3/mm3 Final   • Lymphocytes, Absolute 03/12/2020 2.02  0.70 - 3.10 10*3/mm3 Final   • Monocytes, Absolute 03/12/2020 1.15* 0.10 - 0.90 10*3/mm3 Final   • Eosinophils, Absolute 03/12/2020 0.36  0.00 - 0.40 10*3/mm3 Final   • Basophils, Absolute 03/12/2020 0.04  0.00 - 0.20 10*3/mm3 Final   ]

## 2020-06-11 DIAGNOSIS — I25.10 CORONARY ARTERY DISEASE INVOLVING NATIVE CORONARY ARTERY OF NATIVE HEART WITHOUT ANGINA PECTORIS: Chronic | ICD-10-CM

## 2020-06-11 DIAGNOSIS — E11.8 TYPE 2 DIABETES MELLITUS WITH COMPLICATION, WITHOUT LONG-TERM CURRENT USE OF INSULIN (HCC): Chronic | ICD-10-CM

## 2020-06-11 RX ORDER — GLIMEPIRIDE 4 MG/1
TABLET ORAL
Qty: 180 TABLET | Refills: 1 | Status: SHIPPED | OUTPATIENT
Start: 2020-06-11 | End: 2020-12-08

## 2020-06-11 RX ORDER — FOLIC ACID 1 MG/1
TABLET ORAL
Qty: 90 TABLET | Refills: 1 | Status: SHIPPED | OUTPATIENT
Start: 2020-06-11 | End: 2020-12-08

## 2020-06-11 RX ORDER — SITAGLIPTIN AND METFORMIN HYDROCHLORIDE 500; 50 MG/1; MG/1
TABLET, FILM COATED ORAL
Qty: 180 TABLET | Refills: 1 | Status: SHIPPED | OUTPATIENT
Start: 2020-06-11 | End: 2020-12-08

## 2020-06-23 ENCOUNTER — OFFICE VISIT (OUTPATIENT)
Dept: FAMILY MEDICINE CLINIC | Facility: CLINIC | Age: 80
End: 2020-06-23

## 2020-06-23 VITALS
WEIGHT: 205 LBS | HEIGHT: 72 IN | BODY MASS INDEX: 27.77 KG/M2 | HEART RATE: 80 BPM | DIASTOLIC BLOOD PRESSURE: 72 MMHG | SYSTOLIC BLOOD PRESSURE: 134 MMHG

## 2020-06-23 DIAGNOSIS — R22.2 MASS OF RIGHT CHEST WALL: ICD-10-CM

## 2020-06-23 DIAGNOSIS — Z00.00 MEDICARE ANNUAL WELLNESS VISIT, SUBSEQUENT: Primary | ICD-10-CM

## 2020-06-23 PROCEDURE — G0439 PPPS, SUBSEQ VISIT: HCPCS | Performed by: INTERNAL MEDICINE

## 2020-06-23 PROCEDURE — 99213 OFFICE O/P EST LOW 20 MIN: CPT | Performed by: INTERNAL MEDICINE

## 2020-06-23 NOTE — PROGRESS NOTES
QUICK REFERENCE INFORMATION:  The ABCs of the Annual Wellness Visit    Subsequent Medicare Wellness Visit    HEALTH RISK ASSESSMENT    1940    Recent Hospitalizations:  Recently treated at the following:  Other: [facility].        Current Medical Providers:  Patient Care Team:  Serg Dale MD as PCP - General (Family Medicine)  Bandar Romero as PCP - Claims Attributed  Ashu Escamilla (Ophthalmology)  Birdie Spencer MD (Pulmonary Disease)        Smoking Status:  Social History     Tobacco Use   Smoking Status Never Smoker   Smokeless Tobacco Never Used       Alcohol Consumption:  Social History     Substance and Sexual Activity   Alcohol Use No       Depression Screen:   PHQ-2/PHQ-9 Depression Screening 6/23/2020   Little interest or pleasure in doing things 0   Feeling down, depressed, or hopeless 0   Trouble falling or staying asleep, or sleeping too much -   Feeling tired or having little energy -   Poor appetite or overeating -   Feeling bad about yourself - or that you are a failure or have let yourself or your family down -   Trouble concentrating on things, such as reading the newspaper or watching television -   Moving or speaking so slowly that other people could have noticed. Or the opposite - being so fidgety or restless that you have been moving around a lot more than usual -   Thoughts that you would be better off dead, or of hurting yourself in some way -   Total Score 0       Health Habits and Functional and Cognitive Screening:  Functional & Cognitive Status 6/23/2020   Do you have difficulty preparing food and eating? No   Do you have difficulty bathing yourself, getting dressed or grooming yourself? No   Do you have difficulty using the toilet? No   Do you have difficulty moving around from place to place? No   Do you have trouble with steps or getting out of a bed or a chair? No   Current Diet Well Balanced Diet   Dental Exam Up to date   Eye Exam Up to date    Exercise (times per week) 4 times per week   Current Exercise Activities Include Walking   Do you need help using the phone?  No   Are you deaf or do you have serious difficulty hearing?  No   Do you need help with transportation? No   Do you need help shopping? No   Do you need help preparing meals?  No   Do you need help with housework?  No   Do you need help with laundry? No   Do you need help taking your medications? No   Do you need help managing money? No   Do you ever drive or ride in a car without wearing a seat belt? No   Have you felt unusual stress, anger or loneliness in the last month? No   Who do you live with? Spouse   If you need help, do you have trouble finding someone available to you? No   Have you been bothered in the last four weeks by sexual problems? No   Do you have difficulty concentrating, remembering or making decisions? No           Does the patient have evidence of cognitive impairment? No    Asiprin use counseling: Taking ASA appropriately as indicated      Recent Lab Results:    Visual Acuity:  No exam data present    Age-appropriate Screening Schedule:  Refer to the list below for future screening recommendations based on patient's age, sex and/or medical conditions. Orders for these recommended tests are listed in the plan section. The patient has been provided with a written plan.    Health Maintenance   Topic Date Due   • TDAP/TD VACCINES (1 - Tdap) 01/16/1951   • ZOSTER VACCINE (2 of 3) 12/04/2015   • DIABETIC EYE EXAM  06/18/2019   • URINE MICROALBUMIN  03/04/2020   • INFLUENZA VACCINE  08/01/2020   • HEMOGLOBIN A1C  09/12/2020   • PROSTATE CANCER SCREENING  03/12/2021   • LIPID PANEL  03/12/2021   • COLONOSCOPY  02/07/2025        Subjective   History of Present Illness    Darwin Fraga is a 80 y.o. male who presents for an Annual Wellness Visit.    The following portions of the patient's history were reviewed and updated as appropriate: allergies, current medications, past  family history, past medical history, past social history, past surgical history and problem list.    Outpatient Medications Prior to Visit   Medication Sig Dispense Refill   • amLODIPine (NORVASC) 10 MG tablet Take 1 tablet by mouth Daily. 90 tablet 3   • aspirin 81 MG EC tablet Take 81 mg by mouth Daily.     • Calcium Polycarbophil (FIBERCON PO) Take 2 tablets by mouth Daily.     • clobetasol (OLUX) 0.05 % topical foam   5   • Docusate Sodium (STOOL SOFTENER) 100 MG capsule Take 100 mg by mouth Daily.     • folic acid (FOLVITE) 1 MG tablet TAKE 1 TABLET DAILY 90 tablet 1   • glimepiride (AMARYL) 4 MG tablet TAKE 1 TABLET TWICE A  tablet 1   • glucose blood test strip Use as instructed 100 each 3   • glucose monitor monitoring kit 1 each Daily. 1 each 0   • JANUMET  MG per tablet TAKE 1 TABLET TWICE A  tablet 1   • labetalol (NORMODYNE) 200 MG tablet Take 1 tablet by mouth 2 (Two) Times a Day. 180 tablet 3   • Lancets misc Use to test blood sugar once daily 100 each 3   • levothyroxine (SYNTHROID, LEVOTHROID) 25 MCG tablet TAKE 1 TABLET DAILY 90 tablet 3   • Multiple Vitamins-Minerals (PRESERVISION AREDS 2+MULTI VIT) capsule Take 1 capsule by mouth 2 (Two) Times a Day.     • naproxen (NAPROSYN) 500 MG tablet TAKE 1 TABLET DAILY AS NEEDED FOR MILD PAIN OR MODERATE PAIN 90 tablet 1   • ONE TOUCH LANCETS misc 1 each Daily. 200 each 2   • pantoprazole (PROTONIX) 40 MG EC tablet TAKE 1 TABLET DAILY 90 tablet 3   • pioglitazone (ACTOS) 15 MG tablet Take 1 tablet by mouth Daily. 90 tablet 3   • pravastatin (PRAVACHOL) 40 MG tablet TAKE 1 TABLET EVERY NIGHT 90 tablet 4   • Probiotic Product (PROBIOTIC DAILY PO) Take 1 capsule by mouth Daily.     • tamsulosin (FLOMAX) 0.4 MG capsule 24 hr capsule TAKE 1 CAPSULE DAILY 90 capsule 0     No facility-administered medications prior to visit.        Patient Active Problem List   Diagnosis   • Type 2 diabetes mellitus with complication, without long-term current  "use of insulin (CMS/[facility])   • Primary osteoarthritis involving multiple joints   • Mixed hyperlipidemia   • Essential hypertension   • Benign non-nodular prostatic hyperplasia with lower urinary tract symptoms   • Acquired hypothyroidism   • Gastroesophageal reflux disease without esophagitis   • Coronary artery disease involving native coronary artery of native heart without angina pectoris   • Chronic renal impairment, stage 3 (moderate) (CMS/[facility])   • Macular degeneration of both eyes   • No diabetic retinopathy in both eyes   • Erectile dysfunction associated with type 2 diabetes mellitus (CMS/[facility])       Advance Care Planning:  ACP Discussion Status: ACP discussion was held with the patient during this visit. Patient has an advance directive (not in EMR), copy requested.      Identification of Risk Factors:  Risk factors include: weight  and cardiovascular risk.    Review of Systems    Compared to one year ago, the patient feels his physical health is the same.  Compared to one year ago, the patient feels his mental health is the same.    Objective     Physical Exam    Vitals:    06/23/20 0932   BP: 134/72   BP Location: Left arm   Patient Position: Sitting   Cuff Size: Adult   Pulse: 80   Weight: 93 kg (205 lb)   Height: 182.9 cm (72\")   PainSc: 0-No pain       Patient's Body mass index is 27.8 kg/m². BMI is above normal parameters. Recommendations include: educational material.      Assessment/Plan   Patient Self-Management and Personalized Health Advice  The patient has been provided with information about: diet, exercise and weight management and preventive services including:   · Exercise counseling provided, Fall Risk assessment done, Nutrition counseling provided, Prostate cancer screening discussed.    Visit Diagnoses:    ICD-10-CM ICD-9-CM   1. Medicare annual wellness visit, subsequent Z00.00 V70.0   2. Mass of right chest wall R22.2 786.6       Orders Placed This Encounter   Procedures   • XR Ribs Right " With PA Chest     Standing Status:   Future     Standing Expiration Date:   6/23/2021     Order Specific Question:   Reason for Exam:     Answer:   mass at lower border of R Ribs       Outpatient Encounter Medications as of 6/23/2020   Medication Sig Dispense Refill   • amLODIPine (NORVASC) 10 MG tablet Take 1 tablet by mouth Daily. 90 tablet 3   • aspirin 81 MG EC tablet Take 81 mg by mouth Daily.     • Calcium Polycarbophil (FIBERCON PO) Take 2 tablets by mouth Daily.     • clobetasol (OLUX) 0.05 % topical foam   5   • Docusate Sodium (STOOL SOFTENER) 100 MG capsule Take 100 mg by mouth Daily.     • folic acid (FOLVITE) 1 MG tablet TAKE 1 TABLET DAILY 90 tablet 1   • glimepiride (AMARYL) 4 MG tablet TAKE 1 TABLET TWICE A  tablet 1   • glucose blood test strip Use as instructed 100 each 3   • glucose monitor monitoring kit 1 each Daily. 1 each 0   • JANUMET  MG per tablet TAKE 1 TABLET TWICE A  tablet 1   • labetalol (NORMODYNE) 200 MG tablet Take 1 tablet by mouth 2 (Two) Times a Day. 180 tablet 3   • Lancets misc Use to test blood sugar once daily 100 each 3   • levothyroxine (SYNTHROID, LEVOTHROID) 25 MCG tablet TAKE 1 TABLET DAILY 90 tablet 3   • Multiple Vitamins-Minerals (PRESERVISION AREDS 2+MULTI VIT) capsule Take 1 capsule by mouth 2 (Two) Times a Day.     • naproxen (NAPROSYN) 500 MG tablet TAKE 1 TABLET DAILY AS NEEDED FOR MILD PAIN OR MODERATE PAIN 90 tablet 1   • ONE TOUCH LANCETS misc 1 each Daily. 200 each 2   • pantoprazole (PROTONIX) 40 MG EC tablet TAKE 1 TABLET DAILY 90 tablet 3   • pioglitazone (ACTOS) 15 MG tablet Take 1 tablet by mouth Daily. 90 tablet 3   • pravastatin (PRAVACHOL) 40 MG tablet TAKE 1 TABLET EVERY NIGHT 90 tablet 4   • Probiotic Product (PROBIOTIC DAILY PO) Take 1 capsule by mouth Daily.     • tamsulosin (FLOMAX) 0.4 MG capsule 24 hr capsule TAKE 1 CAPSULE DAILY 90 capsule 0     No facility-administered encounter medications on file as of 6/23/2020.         Reviewed use of high risk medication in the elderly: yes  Reviewed for potential of harmful drug interactions in the elderly: yes    Follow Up:  Return in 20 weeks (on 11/10/2020) for Next scheduled follow up, Or sooner as needed With Labs prior to appointment.     An After Visit Summary and PPPS with all of these plans were given to the patient.         .

## 2020-06-23 NOTE — PROGRESS NOTES
"Chief Complaint   Patient presents with   • Cyst     Right side of chest x 2 weeks, no pain   • Medicare Wellness-subsequent     Subjective   Darwin Fraga is a 80 y.o. male who presents to the office complaining of a cyst on the right side of his chest.  He noticed it a couple of weeks ago.  It is on the lower border of the right ribs.  There is no pain associated with this.  There has been no drainage.  He has no known injury in this area.  He has had rib fractures years ago, but thinks this was more posterior.    The following portions of the patient's history were reviewed and updated as appropriate: allergies, current medications, past family history, past medical history, past social history, past surgical history and problem list.    Review of Systems   Constitutional: Negative for chills, fatigue and fever.   HENT: Negative for congestion, sneezing, sore throat and trouble swallowing.    Eyes: Negative for visual disturbance.   Respiratory: Negative for cough, chest tightness, shortness of breath and wheezing.    Cardiovascular: Negative for chest pain, palpitations and leg swelling.   Gastrointestinal: Negative for abdominal pain, constipation, diarrhea, nausea and vomiting.   Genitourinary: Negative for dysuria, frequency and urgency.   Musculoskeletal: Negative for neck pain.   Skin: Negative for rash.   Neurological: Negative for dizziness, weakness and headaches.   Psychiatric/Behavioral:        Patient denies any feelings of depression and has not felt down, hopeless or lost interest in any activities.   All other systems reviewed and are negative.      Objective   Vitals:    06/23/20 0932   BP: 134/72   BP Location: Left arm   Patient Position: Sitting   Cuff Size: Adult   Pulse: 80   Weight: 93 kg (205 lb)   Height: 182.9 cm (72\")   PainSc: 0-No pain     Body mass index is 27.8 kg/m².  Physical Exam   Constitutional: He is oriented to person, place, and time. He appears well-developed and " well-nourished. No distress.   HENT:   Head: Normocephalic and atraumatic.   Right Ear: Hearing and external ear normal.   Left Ear: Hearing and external ear normal.   Nose: Nose normal.   Eyes: Pupils are equal, round, and reactive to light. EOM are normal. Right eye exhibits no discharge. Left eye exhibits no discharge.   Neck: Normal range of motion.   Pulmonary/Chest: Effort normal.   There is a golf ball sized mass present at the lower border of the rib cage on the right side.  This is hard and not movable.  It is nontender to palpation.   Neurological: He is alert and oriented to person, place, and time. No cranial nerve deficit.   Psychiatric: He has a normal mood and affect. His behavior is normal. Judgment and thought content normal.       Assessment/Plan   Darwin was seen today for [facility] and medicare wellness-subsequent.    Diagnoses and all orders for this visit:    Medicare annual wellness visit, subsequent    Mass of right chest wall  -     XR Ribs Right With PA Chest; Future    I will obtain an x-ray of the right ribs with chest for further evaluation of the mass.  Further work-up determination will be made once I have these results.  At that time, he may need surgical consult for excision or biopsy of this lesion.         PHQ-2/PHQ-9 Depression Screening 6/23/2020   Little interest or pleasure in doing things 0   Feeling down, depressed, or hopeless 0   Trouble falling or staying asleep, or sleeping too much -   Feeling tired or having little energy -   Poor appetite or overeating -   Feeling bad about yourself - or that you are a failure or have let yourself or your family down -   Trouble concentrating on things, such as reading the newspaper or watching television -   Moving or speaking so slowly that other people could have noticed. Or the opposite - being so fidgety or restless that you have been moving around a lot more than usual -   Thoughts that you would be better off dead, or of hurting  yourself in some way -   Total Score 0

## 2020-06-25 DIAGNOSIS — R22.2 MASS IN CHEST: Primary | ICD-10-CM

## 2020-06-30 ENCOUNTER — CONSULT (OUTPATIENT)
Dept: SURGERY | Facility: CLINIC | Age: 80
End: 2020-06-30

## 2020-06-30 VITALS
WEIGHT: 204.6 LBS | SYSTOLIC BLOOD PRESSURE: 136 MMHG | HEIGHT: 72 IN | TEMPERATURE: 97.3 F | DIASTOLIC BLOOD PRESSURE: 72 MMHG | HEART RATE: 85 BPM | BODY MASS INDEX: 27.71 KG/M2

## 2020-06-30 DIAGNOSIS — M95.4 CHEST WALL DEFORMITY: Primary | ICD-10-CM

## 2020-06-30 PROCEDURE — 99203 OFFICE O/P NEW LOW 30 MIN: CPT | Performed by: SURGERY

## 2020-06-30 NOTE — PROGRESS NOTES
Subjective   Darwin Fraga is a 80 y.o. male     Chief Complaint: Possible chest wall mass    History of Present Illness referred for concern about a possible right chest wall mass.  Patient denies any pain at the site.  He noticed that this was slightly prominent compared to the other side when he was showering.  Really has not changed much over the time he is noticed it for about 2 weeks now.  Patient was a  and had multiple rib fractures in the past and unclear if that is related to this or not.  No recent trauma recently.  Rib x-ray series was unremarkable.    Review of Systems   Constitutional: Negative.    HENT: Positive for tinnitus.    Eyes:        Macular degenration   Respiratory: Negative.    Cardiovascular: Negative.    Gastrointestinal: Negative.    Endocrine: Negative.    Genitourinary: Negative.    Musculoskeletal: Positive for arthralgias.   Skin:        Possible Cyst Right chest wall.   Allergic/Immunologic: Negative.    Neurological: Negative.    Hematological: Bruises/bleeds easily.   Psychiatric/Behavioral: Negative.      Past Medical History:   Diagnosis Date   • Acquired hypothyroidism    • Acute urinary tract infection    • Anemia    • Benign non-nodular prostatic hyperplasia with lower urinary tract symptoms    • Diverticular disease of colon    • Essential hypertension    • Gastrointestinal hemorrhage    • History of myocardial infarction    • Hyperlipidemia    • Impacted cerumen    • Low back pain     rad to left hip   • Macular degeneration    • Macular degeneration 09/01/2017    Dr. Trotter in [place], [place]   • Otalgia    • Primary osteoarthritis involving multiple joints    • Special screening for malignant neoplasms, colon    • Type 2 diabetes mellitus without complication (CMS/HCC)    • Uric acid renal calculus    • Urinary incontinence      Past Surgical History:   Procedure Laterality Date   • CARDIAC SURGERY      Pace maker insertion 8-32-19 Dr. Charles   •  COLONOSCOPY     • ENDOSCOPY AND COLONOSCOPY  02/2015    GI bleed   • EYE SURGERY      Lasik   • OTHER SURGICAL HISTORY  10/06/2015    Remove Impacted Cerumen 59612 (1)      • TONSILLECTOMY  10/06/2015     Family History   Problem Relation Age of Onset   • Cancer Mother         liver   • Uterine cancer Mother    • Cancer Father         penis   • COPD Father    • Emphysema Father    • Diabetes Maternal Grandfather      Social History     Socioeconomic History   • Marital status:      Spouse name: Not on file   • Number of children: Not on file   • Years of education: Not on file   • Highest education level: Not on file   Tobacco Use   • Smoking status: Never Smoker   • Smokeless tobacco: Never Used   Substance and Sexual Activity   • Alcohol use: No   • Drug use: No   • Sexual activity: Defer     Allergies   Allergen Reactions   • Contrast Dye Hives and GI Intolerance   • Iodine Hives     Hives and vomiting with contrast   • Tuberculin Other (See Comments)     Redness and swelling     Vitals:    06/30/20 1041   BP: 136/72   Pulse: 85   Temp: 97.3 °F (36.3 °C)       Home Medications:  Prior to Admission medications    Medication Sig Start Date End Date Taking? Authorizing Provider   amLODIPine (NORVASC) 10 MG tablet Take 1 tablet by mouth Daily. 9/16/19  Yes Serg Dale MD   aspirin 81 MG EC tablet Take 81 mg by mouth Daily.   Yes ProviderEric MD   Calcium Polycarbophil (FIBERCON PO) Take 2 tablets by mouth Daily.   Yes Eric King MD   Docusate Sodium (STOOL SOFTENER) 100 MG capsule Take 100 mg by mouth Daily.   Yes Eric King MD   folic acid (FOLVITE) 1 MG tablet TAKE 1 TABLET DAILY 6/11/20  Yes Serg Dale MD   glimepiride (AMARYL) 4 MG tablet TAKE 1 TABLET TWICE A DAY 6/11/20  Yes Serg Dale MD   glucose blood test strip Use as instructed 9/17/19  Yes Serg Dale MD   glucose monitor monitoring kit 1 each Daily. 9/17/19  Yes Serg Dale MD JANUMET   MG per tablet TAKE 1 TABLET TWICE A DAY 6/11/20  Yes Serg Dale MD   labetalol (NORMODYNE) 200 MG tablet Take 1 tablet by mouth 2 (Two) Times a Day. 9/16/19  Yes Serg Dale MD   Lancets misc Use to test blood sugar once daily 9/17/19  Yes Serg Dale MD   levothyroxine (SYNTHROID, LEVOTHROID) 25 MCG tablet TAKE 1 TABLET DAILY 5/12/20  Yes Serg Dale MD   Multiple Vitamins-Minerals (PRESERVISION AREDS 2+MULTI VIT) capsule Take 1 capsule by mouth 2 (Two) Times a Day.   Yes Eric iKng MD   ONE TOUCH LANCETS misc 1 each Daily. 9/16/19  Yes Serg Dale MD   pantoprazole (PROTONIX) 40 MG EC tablet TAKE 1 TABLET DAILY 5/12/20  Yes Serg Dale MD   pioglitazone (ACTOS) 15 MG tablet Take 1 tablet by mouth Daily. 9/16/19  Yes Serg Dale MD   pravastatin (PRAVACHOL) 40 MG tablet TAKE 1 TABLET EVERY NIGHT 9/16/19  Yes Serg Dale MD   Probiotic Product (PROBIOTIC DAILY PO) Take 1 capsule by mouth Daily.   Yes Eric King MD   tamsulosin (FLOMAX) 0.4 MG capsule 24 hr capsule TAKE 1 CAPSULE DAILY 1/14/19  Yes Serg Dale MD   clobetasol (OLUX) 0.05 % topical foam  12/31/16   Eric King MD   naproxen (NAPROSYN) 500 MG tablet TAKE 1 TABLET DAILY AS NEEDED FOR MILD PAIN OR MODERATE PAIN 2/6/20   Serg Dale MD       Objective   Physical Exam   Constitutional: He appears well-developed and well-nourished. No distress.   HENT:   Head: Normocephalic and atraumatic.   Nose: Nose normal.   Eyes: Pupils are equal, round, and reactive to light. No scleral icterus.   Neck: Normal range of motion. Neck supple. No JVD present. No tracheal deviation present.   Cardiovascular: Normal rate and regular rhythm.   Pulmonary/Chest: Effort normal and breath sounds normal. No stridor. No respiratory distress. He has no wheezes. He has no rales. He exhibits no tenderness.   Abdominal: Soft. He exhibits no distension. There is no tenderness.   Musculoskeletal:  Normal range of motion.   Skin: He is not diaphoretic.   Psychiatric: He has a normal mood and affect. His behavior is normal. Judgment and thought content normal.   Area in question is right at the edge of the costal margin anteriorly where what feels like more prominent bone compared to the left side but has a prominent area on the other side as well that corresponds to this.  No ulceration no mass separate from the bone.    Assessment/Plan Suspect this is just a mild asymmetric variation compared to the other side and may be related to previous history of rib fractures.  We did talk about possible tumor of the bone and could do a CT scan.  I think there is a low likelihood that this is any type of tumor unless it changes significantly or becomes more symptomatic.  X-ray was unremarkable.  I think if there is any change in symptoms or increase in size then a CT scan to be done but otherwise I would follow at this point.  Patient agrees with that plan he declined doing a CT scan now.      The encounter diagnosis was Chest wall deformity.                     This document has been electronically signed by Joe Oliver MD on June 30, 2020 12:18

## 2020-06-30 NOTE — PATIENT INSTRUCTIONS

## 2020-07-31 DIAGNOSIS — I10 ESSENTIAL HYPERTENSION: Chronic | ICD-10-CM

## 2020-07-31 DIAGNOSIS — M15.9 PRIMARY OSTEOARTHRITIS INVOLVING MULTIPLE JOINTS: Chronic | ICD-10-CM

## 2020-08-03 RX ORDER — NAPROXEN 500 MG/1
TABLET ORAL
Qty: 90 TABLET | Refills: 1 | Status: SHIPPED | OUTPATIENT
Start: 2020-08-03 | End: 2021-01-14

## 2020-08-03 RX ORDER — LABETALOL 200 MG/1
TABLET, FILM COATED ORAL
Qty: 180 TABLET | Refills: 3 | Status: SHIPPED | OUTPATIENT
Start: 2020-08-03 | End: 2021-07-15

## 2020-09-25 DIAGNOSIS — I10 ESSENTIAL HYPERTENSION: Chronic | ICD-10-CM

## 2020-09-25 RX ORDER — AMLODIPINE BESYLATE 10 MG/1
TABLET ORAL
Qty: 90 TABLET | Refills: 3 | Status: SHIPPED | OUTPATIENT
Start: 2020-09-25 | End: 2021-08-30

## 2020-10-24 DIAGNOSIS — E78.2 MIXED HYPERLIPIDEMIA: Chronic | ICD-10-CM

## 2020-10-26 RX ORDER — PRAVASTATIN SODIUM 40 MG
TABLET ORAL
Qty: 90 TABLET | Refills: 3 | Status: SHIPPED | OUTPATIENT
Start: 2020-10-26 | End: 2021-10-21

## 2020-11-03 ENCOUNTER — LAB (OUTPATIENT)
Dept: LAB | Facility: OTHER | Age: 80
End: 2020-11-03

## 2020-11-03 DIAGNOSIS — E03.9 ACQUIRED HYPOTHYROIDISM: Chronic | ICD-10-CM

## 2020-11-03 DIAGNOSIS — E11.8 TYPE 2 DIABETES MELLITUS WITH COMPLICATION, WITHOUT LONG-TERM CURRENT USE OF INSULIN (HCC): Chronic | ICD-10-CM

## 2020-11-03 DIAGNOSIS — I10 ESSENTIAL HYPERTENSION: ICD-10-CM

## 2020-11-03 DIAGNOSIS — E78.2 MIXED HYPERLIPIDEMIA: Chronic | ICD-10-CM

## 2020-11-03 LAB
ALBUMIN SERPL-MCNC: 4.1 G/DL (ref 3.5–5)
ALBUMIN/GLOB SERPL: 1.4 G/DL (ref 1.1–1.8)
ALP SERPL-CCNC: 85 U/L (ref 38–126)
ALT SERPL W P-5'-P-CCNC: 16 U/L
ANION GAP SERPL CALCULATED.3IONS-SCNC: 8 MMOL/L (ref 5–15)
AST SERPL-CCNC: 24 U/L (ref 17–59)
BASOPHILS # BLD AUTO: 0.05 10*3/MM3 (ref 0–0.2)
BASOPHILS NFR BLD AUTO: 0.6 % (ref 0–1.5)
BILIRUB SERPL-MCNC: 1 MG/DL (ref 0.2–1.3)
BILIRUB UR QL STRIP: NEGATIVE
BUN SERPL-MCNC: 21 MG/DL (ref 7–23)
BUN/CREAT SERPL: 14.9 (ref 7–25)
CALCIUM SPEC-SCNC: 9.2 MG/DL (ref 8.4–10.2)
CHLORIDE SERPL-SCNC: 104 MMOL/L (ref 101–112)
CLARITY UR: ABNORMAL
CO2 SERPL-SCNC: 26 MMOL/L (ref 22–30)
COLOR UR: ABNORMAL
CREAT SERPL-MCNC: 1.41 MG/DL (ref 0.7–1.3)
DEPRECATED RDW RBC AUTO: 44.7 FL (ref 37–54)
EOSINOPHIL # BLD AUTO: 0.32 10*3/MM3 (ref 0–0.4)
EOSINOPHIL NFR BLD AUTO: 3.6 % (ref 0.3–6.2)
ERYTHROCYTE [DISTWIDTH] IN BLOOD BY AUTOMATED COUNT: 14.1 % (ref 12.3–15.4)
GFR SERPL CREATININE-BSD FRML MDRD: 49 ML/MIN/1.73 (ref 42–98)
GLOBULIN UR ELPH-MCNC: 2.9 GM/DL (ref 2.3–3.5)
GLUCOSE SERPL-MCNC: 108 MG/DL (ref 70–99)
GLUCOSE UR STRIP-MCNC: NEGATIVE MG/DL
HCT VFR BLD AUTO: 42.4 % (ref 37.5–51)
HGB BLD-MCNC: 13.7 G/DL (ref 13–17.7)
HGB UR QL STRIP.AUTO: NEGATIVE
KETONES UR QL STRIP: NEGATIVE
LEUKOCYTE ESTERASE UR QL STRIP.AUTO: NEGATIVE
LYMPHOCYTES # BLD AUTO: 1.83 10*3/MM3 (ref 0.7–3.1)
LYMPHOCYTES NFR BLD AUTO: 20.5 % (ref 19.6–45.3)
MCH RBC QN AUTO: 28.4 PG (ref 26.6–33)
MCHC RBC AUTO-ENTMCNC: 32.3 G/DL (ref 31.5–35.7)
MCV RBC AUTO: 87.8 FL (ref 79–97)
MONOCYTES # BLD AUTO: 1.13 10*3/MM3 (ref 0.1–0.9)
MONOCYTES NFR BLD AUTO: 12.7 % (ref 5–12)
NEUTROPHILS NFR BLD AUTO: 5.58 10*3/MM3 (ref 1.7–7)
NEUTROPHILS NFR BLD AUTO: 62.6 % (ref 42.7–76)
NITRITE UR QL STRIP: NEGATIVE
PH UR STRIP.AUTO: 5.5 [PH] (ref 5.5–8)
PLATELET # BLD AUTO: 198 10*3/MM3 (ref 140–450)
PMV BLD AUTO: 11.2 FL (ref 6–12)
POTASSIUM SERPL-SCNC: 3.9 MMOL/L (ref 3.4–5)
PROT SERPL-MCNC: 7 G/DL (ref 6.3–8.6)
PROT UR QL STRIP: NEGATIVE
RBC # BLD AUTO: 4.83 10*6/MM3 (ref 4.14–5.8)
SODIUM SERPL-SCNC: 138 MMOL/L (ref 137–145)
SP GR UR STRIP: 1.02 (ref 1–1.03)
UROBILINOGEN UR QL STRIP: ABNORMAL
WBC # BLD AUTO: 8.91 10*3/MM3 (ref 3.4–10.8)

## 2020-11-03 PROCEDURE — 84443 ASSAY THYROID STIM HORMONE: CPT | Performed by: INTERNAL MEDICINE

## 2020-11-03 PROCEDURE — 82570 ASSAY OF URINE CREATININE: CPT | Performed by: INTERNAL MEDICINE

## 2020-11-03 PROCEDURE — 36415 COLL VENOUS BLD VENIPUNCTURE: CPT | Performed by: INTERNAL MEDICINE

## 2020-11-03 PROCEDURE — 83721 ASSAY OF BLOOD LIPOPROTEIN: CPT | Performed by: INTERNAL MEDICINE

## 2020-11-03 PROCEDURE — 84439 ASSAY OF FREE THYROXINE: CPT | Performed by: INTERNAL MEDICINE

## 2020-11-03 PROCEDURE — 80053 COMPREHEN METABOLIC PANEL: CPT | Performed by: INTERNAL MEDICINE

## 2020-11-03 PROCEDURE — 82043 UR ALBUMIN QUANTITATIVE: CPT | Performed by: INTERNAL MEDICINE

## 2020-11-03 PROCEDURE — 81003 URINALYSIS AUTO W/O SCOPE: CPT | Performed by: INTERNAL MEDICINE

## 2020-11-03 PROCEDURE — 85025 COMPLETE CBC W/AUTO DIFF WBC: CPT | Performed by: INTERNAL MEDICINE

## 2020-11-03 PROCEDURE — 83036 HEMOGLOBIN GLYCOSYLATED A1C: CPT | Performed by: INTERNAL MEDICINE

## 2020-11-04 LAB
ALBUMIN UR-MCNC: 2 MG/DL
ARTICHOKE IGE QN: 83 MG/DL (ref 0–100)
CREAT UR-MCNC: 202 MG/DL
HBA1C MFR BLD: 6.4 % (ref 4.8–5.6)
MICROALBUMIN/CREAT UR: 9.9 MG/G
T4 FREE SERPL-MCNC: 1.4 NG/DL (ref 0.93–1.7)
TSH SERPL DL<=0.05 MIU/L-ACNC: 2.92 UIU/ML (ref 0.27–4.2)

## 2020-11-10 ENCOUNTER — OFFICE VISIT (OUTPATIENT)
Dept: FAMILY MEDICINE CLINIC | Facility: CLINIC | Age: 80
End: 2020-11-10

## 2020-11-10 VITALS
WEIGHT: 206 LBS | BODY MASS INDEX: 27.9 KG/M2 | HEIGHT: 72 IN | DIASTOLIC BLOOD PRESSURE: 74 MMHG | SYSTOLIC BLOOD PRESSURE: 124 MMHG | TEMPERATURE: 97.6 F | HEART RATE: 68 BPM | OXYGEN SATURATION: 99 %

## 2020-11-10 DIAGNOSIS — M15.9 PRIMARY OSTEOARTHRITIS INVOLVING MULTIPLE JOINTS: Chronic | ICD-10-CM

## 2020-11-10 DIAGNOSIS — I10 ESSENTIAL HYPERTENSION: Chronic | ICD-10-CM

## 2020-11-10 DIAGNOSIS — E03.9 ACQUIRED HYPOTHYROIDISM: Chronic | ICD-10-CM

## 2020-11-10 DIAGNOSIS — K21.9 GASTROESOPHAGEAL REFLUX DISEASE WITHOUT ESOPHAGITIS: Chronic | ICD-10-CM

## 2020-11-10 DIAGNOSIS — I25.10 CORONARY ARTERY DISEASE INVOLVING NATIVE CORONARY ARTERY OF NATIVE HEART WITHOUT ANGINA PECTORIS: Chronic | ICD-10-CM

## 2020-11-10 DIAGNOSIS — E78.2 MIXED HYPERLIPIDEMIA: Chronic | ICD-10-CM

## 2020-11-10 DIAGNOSIS — N18.31 CHRONIC RENAL IMPAIRMENT, STAGE 3A (HCC): Chronic | ICD-10-CM

## 2020-11-10 DIAGNOSIS — N40.1 BENIGN NON-NODULAR PROSTATIC HYPERPLASIA WITH LOWER URINARY TRACT SYMPTOMS: Chronic | ICD-10-CM

## 2020-11-10 DIAGNOSIS — Z12.5 SCREENING FOR PROSTATE CANCER: ICD-10-CM

## 2020-11-10 DIAGNOSIS — E11.8 TYPE 2 DIABETES MELLITUS WITH COMPLICATION, WITHOUT LONG-TERM CURRENT USE OF INSULIN (HCC): Primary | Chronic | ICD-10-CM

## 2020-11-10 PROCEDURE — 99214 OFFICE O/P EST MOD 30 MIN: CPT | Performed by: INTERNAL MEDICINE

## 2020-11-10 NOTE — PROGRESS NOTES
Chief Complaint   Patient presents with   • Hyperlipidemia     6 month f/u on labs    • Hypertension     Subjective   Darwin Fraga is a 80 y.o. male who presents to the office for follow-up and review of labs.  He has diabetes, and his blood sugar has been doing well.  He has been compliant with his diabetic medications.  He has hypertension and his blood pressure has been controlled.  He has hypothyroidism and takes Synthroid daily.  He has osteoarthritis and takes naproxen as needed.  He uses this sparingly due to renal insufficiency.  He has chronic renal impairment, stage IIIb.  His baseline creatinine runs around 1.4-1.5.  He has BPH and takes Flomax daily.  His urinary symptoms are currently well managed.  He has GERD and takes Protonix daily.  He has diabetic peripheral neuropathy, but does not require medication for this.  He denies any significant neuropathic pain.  He has coronary artery disease and follows with cardiology.      The following portions of the patient's history were reviewed and updated as appropriate: allergies, current medications, past family history, past medical history, past social history, past surgical history and problem list.    Review of Systems   Constitutional: Negative for chills, fatigue and fever.   HENT: Negative for congestion, sneezing, sore throat and trouble swallowing.    Eyes: Negative for visual disturbance.   Respiratory: Negative for cough, chest tightness, shortness of breath and wheezing.    Cardiovascular: Negative for chest pain, palpitations and leg swelling.   Gastrointestinal: Negative for abdominal pain, constipation, diarrhea, nausea and vomiting.   Genitourinary: Negative for dysuria, frequency and urgency.   Musculoskeletal: Negative for neck pain.   Skin: Negative for rash.   Neurological: Negative for dizziness, weakness and headaches.   Psychiatric/Behavioral:        Patient denies any feelings of depression and has not felt down, hopeless  "or lost interest in any activities.   All other systems reviewed and are negative.      Objective    Vitals:    11/10/20 1026   BP: 124/74   Pulse: 68   Temp: 97.6 °F (36.4 °C)   TempSrc: Oral   SpO2: 99%   Weight: 93.4 kg (206 lb)   Height: 182.9 cm (72\")   PainSc: 0-No pain   PainLoc: Generalized      Body mass index is 27.94 kg/m².      Physical Exam   Constitutional: He is oriented to person, place, and time. He appears well-developed. No distress.   HENT:   Head: Normocephalic and atraumatic.   Nose: Nose normal.   Mouth/Throat: No oropharyngeal exudate.   Eyes: Pupils are equal, round, and reactive to light. Conjunctivae are normal. No scleral icterus.   Neck: Normal range of motion. Neck supple.   Cardiovascular: Normal rate, regular rhythm and normal heart sounds. Exam reveals no gallop and no friction rub.   No murmur heard.  Pulmonary/Chest: Effort normal and breath sounds normal. No respiratory distress. He has no wheezes. He has no rales.   Abdominal: Soft. Bowel sounds are normal. He exhibits no distension. There is no abdominal tenderness. There is no rebound and no guarding.   Musculoskeletal: Normal range of motion.   Lymphadenopathy:     He has no cervical adenopathy.   Neurological: He is alert and oriented to person, place, and time. No cranial nerve deficit.   Skin: Skin is warm and dry. No rash noted.   Psychiatric: His behavior is normal. Judgment and thought content normal.   Nursing note and vitals reviewed.      Assessment/Plan             Diagnoses and all orders for this visit:    1. Type 2 diabetes mellitus with complication, without long-term current use of insulin (CMS/formerly [facility]) (Primary)  -     Hemoglobin A1c; Future    2. Essential hypertension  -     CBC & Differential; Future  -     Comprehensive Metabolic Panel; Future  -     Urinalysis With Culture If Indicated -; Future    3. Mixed hyperlipidemia  -     Lipid Panel; Future    4. Acquired hypothyroidism  -     T4, Free; Future  -     " TSH; Future    5. Gastroesophageal reflux disease without esophagitis    6. Chronic renal impairment, stage 3a    7. Benign non-nodular prostatic hyperplasia with lower urinary tract symptoms    8. Screening for prostate cancer  -     PSA Screen; Future    9. Coronary artery disease involving native coronary artery of native heart without angina pectoris    10. Primary osteoarthritis involving multiple joints         Labs are reviewed with patient.  His glucose is 108 with a hemoglobin A1c of 6.40.  His diabetes is well controlled.  He will continue with his current diabetic medication.  He may monitor blood sugar 1 time daily.  His creatinine is slightly elevated but baseline at 1.41.  His LDL is at goal at 83.  He will continue with pravastatin and niacin for treatment of hyperlipidemia.  His TSH is normal and he will continue with the current dose of Synthroid.  He will continue with Flomax for treatment of BPH.  He will continue with Protonix for treatment of GERD.  His blood pressure is controlled, and he will continue with his current blood pressure medication.  He will continue to follow with cardiology for management of the coronary artery disease.          PHQ-2/PHQ-9 Depression Screening 11/10/2020   Little interest or pleasure in doing things 0   Feeling down, depressed, or hopeless 0   Trouble falling or staying asleep, or sleeping too much -   Feeling tired or having little energy -   Poor appetite or overeating -   Feeling bad about yourself - or that you are a failure or have let yourself or your family down -   Trouble concentrating on things, such as reading the newspaper or watching television -   Moving or speaking so slowly that other people could have noticed. Or the opposite - being so fidgety or restless that you have been moving around a lot more than usual -   Thoughts that you would be better off dead, or of hurting yourself in some way -   Total Score 0       Lab on 11/03/2020   Component  Date Value Ref Range Status   • Glucose 11/03/2020 108* 70 - 99 mg/dL Final   • BUN 11/03/2020 21  7 - 23 mg/dL Final   • Creatinine 11/03/2020 1.41* 0.70 - 1.30 mg/dL Final   • Sodium 11/03/2020 138  137 - 145 mmol/L Final   • Potassium 11/03/2020 3.9  3.4 - 5.0 mmol/L Final   • Chloride 11/03/2020 104  101 - 112 mmol/L Final   • CO2 11/03/2020 26.0  22.0 - 30.0 mmol/L Final   • Calcium 11/03/2020 9.2  8.4 - 10.2 mg/dL Final   • Total Protein 11/03/2020 7.0  6.3 - 8.6 g/dL Final   • Albumin 11/03/2020 4.10  3.50 - 5.00 g/dL Final   • ALT (SGPT) 11/03/2020 16  <=50 U/L Final   • AST (SGOT) 11/03/2020 24  17 - 59 U/L Final   • Alkaline Phosphatase 11/03/2020 85  38 - 126 U/L Final   • Total Bilirubin 11/03/2020 1.0  0.2 - 1.3 mg/dL Final   • eGFR Non African Amer 11/03/2020 49  42 - 98 mL/min/1.73 Final   • Globulin 11/03/2020 2.9  2.3 - 3.5 gm/dL Final   • A/G Ratio 11/03/2020 1.4  1.1 - 1.8 g/dL Final   • BUN/Creatinine Ratio 11/03/2020 14.9  7.0 - 25.0 Final   • Anion Gap 11/03/2020 8.0  5.0 - 15.0 mmol/L Final   • Free T4 11/03/2020 1.40  0.93 - 1.70 ng/dL Final   • TSH 11/03/2020 2.920  0.270 - 4.200 uIU/mL Final   • Color, UA 11/03/2020 Dark Yellow* Yellow, Straw Final   • Appearance, UA 11/03/2020 Slightly Cloudy* Clear Final   • pH, UA 11/03/2020 5.5  5.5 - 8.0 Final   • Specific Gravity, UA 11/03/2020 1.020  1.005 - 1.030 Final   • Glucose, UA 11/03/2020 Negative  Negative Final   • Ketones, UA 11/03/2020 Negative  Negative Final   • Bilirubin, UA 11/03/2020 Negative  Negative Final   • Blood, UA 11/03/2020 Negative  Negative Final   • Protein, UA 11/03/2020 Negative  Negative Final   • Leuk Esterase, UA 11/03/2020 Negative  Negative Final   • Nitrite, UA 11/03/2020 Negative  Negative Final   • Urobilinogen, UA 11/03/2020 0.2 E.U./dL  0.2 - 1.0 E.U./dL Final   • Hemoglobin A1C 11/03/2020 6.40* 4.80 - 5.60 % Final   • LDL Cholesterol  11/03/2020 83  0 - 100 mg/dL Final   • Microalbumin/Creatinine Ratio  11/03/2020 9.9  mg/g Final   • Creatinine, Urine 11/03/2020 202.0  mg/dL Final   • Microalbumin, Urine 11/03/2020 2.0  mg/dL Final   • WBC 11/03/2020 8.91  3.40 - 10.80 10*3/mm3 Final   • RBC 11/03/2020 4.83  4.14 - 5.80 10*6/mm3 Final   • Hemoglobin 11/03/2020 13.7  13.0 - 17.7 g/dL Final   • Hematocrit 11/03/2020 42.4  37.5 - 51.0 % Final   • MCV 11/03/2020 87.8  79.0 - 97.0 fL Final   • MCH 11/03/2020 28.4  26.6 - 33.0 pg Final   • MCHC 11/03/2020 32.3  31.5 - 35.7 g/dL Final   • RDW 11/03/2020 14.1  12.3 - 15.4 % Final   • RDW-SD 11/03/2020 44.7  37.0 - 54.0 fl Final   • MPV 11/03/2020 11.2  6.0 - 12.0 fL Final   • Platelets 11/03/2020 198  140 - 450 10*3/mm3 Final   • Neutrophil % 11/03/2020 62.6  42.7 - 76.0 % Final   • Lymphocyte % 11/03/2020 20.5  19.6 - 45.3 % Final   • Monocyte % 11/03/2020 12.7* 5.0 - 12.0 % Final   • Eosinophil % 11/03/2020 3.6  0.3 - 6.2 % Final   • Basophil % 11/03/2020 0.6  0.0 - 1.5 % Final   • Neutrophils, Absolute 11/03/2020 5.58  1.70 - 7.00 10*3/mm3 Final   • Lymphocytes, Absolute 11/03/2020 1.83  0.70 - 3.10 10*3/mm3 Final   • Monocytes, Absolute 11/03/2020 1.13* 0.10 - 0.90 10*3/mm3 Final   • Eosinophils, Absolute 11/03/2020 0.32  0.00 - 0.40 10*3/mm3 Final   • Basophils, Absolute 11/03/2020 0.05  0.00 - 0.20 10*3/mm3 Final   ]

## 2020-12-08 DIAGNOSIS — I25.10 CORONARY ARTERY DISEASE INVOLVING NATIVE CORONARY ARTERY OF NATIVE HEART WITHOUT ANGINA PECTORIS: Chronic | ICD-10-CM

## 2020-12-08 DIAGNOSIS — E11.8 TYPE 2 DIABETES MELLITUS WITH COMPLICATION, WITHOUT LONG-TERM CURRENT USE OF INSULIN (HCC): Chronic | ICD-10-CM

## 2020-12-08 RX ORDER — FOLIC ACID 1 MG/1
TABLET ORAL
Qty: 90 TABLET | Refills: 3 | Status: SHIPPED | OUTPATIENT
Start: 2020-12-08 | End: 2021-12-03

## 2020-12-08 RX ORDER — GLIMEPIRIDE 4 MG/1
TABLET ORAL
Qty: 180 TABLET | Refills: 3 | Status: SHIPPED | OUTPATIENT
Start: 2020-12-08 | End: 2021-12-03

## 2020-12-08 RX ORDER — SITAGLIPTIN AND METFORMIN HYDROCHLORIDE 500; 50 MG/1; MG/1
TABLET, FILM COATED ORAL
Qty: 180 TABLET | Refills: 3 | Status: SHIPPED | OUTPATIENT
Start: 2020-12-08 | End: 2021-12-03

## 2020-12-28 ENCOUNTER — OFFICE VISIT (OUTPATIENT)
Dept: FAMILY MEDICINE CLINIC | Facility: CLINIC | Age: 80
End: 2020-12-28

## 2020-12-28 VITALS
HEART RATE: 68 BPM | BODY MASS INDEX: 28.71 KG/M2 | WEIGHT: 212 LBS | OXYGEN SATURATION: 99 % | DIASTOLIC BLOOD PRESSURE: 74 MMHG | HEIGHT: 72 IN | SYSTOLIC BLOOD PRESSURE: 124 MMHG | TEMPERATURE: 97.6 F

## 2020-12-28 DIAGNOSIS — H69.81 DYSFUNCTION OF RIGHT EUSTACHIAN TUBE: Primary | ICD-10-CM

## 2020-12-28 DIAGNOSIS — H65.01 NON-RECURRENT ACUTE SEROUS OTITIS MEDIA OF RIGHT EAR: ICD-10-CM

## 2020-12-28 PROCEDURE — 99213 OFFICE O/P EST LOW 20 MIN: CPT | Performed by: INTERNAL MEDICINE

## 2020-12-28 RX ORDER — CEFDINIR 300 MG/1
300 CAPSULE ORAL 2 TIMES DAILY
Qty: 20 CAPSULE | Refills: 0 | Status: SHIPPED | OUTPATIENT
Start: 2020-12-28 | End: 2021-01-07

## 2020-12-28 RX ORDER — FLUTICASONE PROPIONATE 50 MCG
2 SPRAY, SUSPENSION (ML) NASAL DAILY
Qty: 1 BOTTLE | Refills: 0 | Status: SHIPPED | OUTPATIENT
Start: 2020-12-28 | End: 2022-06-10

## 2021-01-14 DIAGNOSIS — M15.9 PRIMARY OSTEOARTHRITIS INVOLVING MULTIPLE JOINTS: Chronic | ICD-10-CM

## 2021-01-14 RX ORDER — NAPROXEN 500 MG/1
TABLET ORAL
Qty: 90 TABLET | Refills: 3 | Status: SHIPPED | OUTPATIENT
Start: 2021-01-14 | End: 2021-12-10

## 2021-03-01 DIAGNOSIS — E11.8 TYPE 2 DIABETES MELLITUS WITH COMPLICATION, WITHOUT LONG-TERM CURRENT USE OF INSULIN (HCC): Chronic | ICD-10-CM

## 2021-03-01 RX ORDER — PIOGLITAZONEHYDROCHLORIDE 15 MG/1
15 TABLET ORAL DAILY
Qty: 90 TABLET | Refills: 3 | Status: SHIPPED | OUTPATIENT
Start: 2021-03-01 | End: 2022-02-24

## 2021-04-01 ENCOUNTER — OFFICE VISIT (OUTPATIENT)
Dept: FAMILY MEDICINE CLINIC | Facility: CLINIC | Age: 81
End: 2021-04-01

## 2021-04-01 VITALS
HEART RATE: 72 BPM | WEIGHT: 209 LBS | OXYGEN SATURATION: 99 % | TEMPERATURE: 98.7 F | HEIGHT: 72 IN | BODY MASS INDEX: 28.31 KG/M2 | DIASTOLIC BLOOD PRESSURE: 76 MMHG | SYSTOLIC BLOOD PRESSURE: 124 MMHG

## 2021-04-01 DIAGNOSIS — N18.31 CHRONIC RENAL IMPAIRMENT, STAGE 3A (HCC): Chronic | ICD-10-CM

## 2021-04-01 DIAGNOSIS — M54.31 SCIATICA OF RIGHT SIDE: ICD-10-CM

## 2021-04-01 DIAGNOSIS — M25.551 RIGHT HIP PAIN: Primary | ICD-10-CM

## 2021-04-01 PROCEDURE — 99214 OFFICE O/P EST MOD 30 MIN: CPT | Performed by: INTERNAL MEDICINE

## 2021-04-01 RX ORDER — GABAPENTIN 100 MG/1
100 CAPSULE ORAL 3 TIMES DAILY
Qty: 90 CAPSULE | Refills: 1 | Status: SHIPPED | OUTPATIENT
Start: 2021-04-01 | End: 2021-09-27 | Stop reason: SDUPTHER

## 2021-04-01 NOTE — PROGRESS NOTES
"  Chief Complaint   Patient presents with   • Hip Pain     patient states that he has pain in the lower right hip with the siatic nerve , nubness radiates down the right side      Subjective   Darwin Fraga is a 81 y.o. male who presents to the office complaining of pain in his right hip extending into the leg.  He is concerned that his sciatica is flaring up.  He has experienced this in the past.  He has numbness and tingling which radiates down his leg to his foot.  His sciatica last flared up 3 years ago.  He took a 1 month course of gabapentin and his symptoms resolved. He has chronic renal impairment, stage IIIa.  This limits his ability to take NSAIDs.    The following portions of the patient's history were reviewed and updated as appropriate: allergies, current medications, past family history, past medical history, past social history, past surgical history and problem list.    Review of Systems   Constitutional: Negative for chills, fatigue and fever.   HENT: Negative for congestion, sneezing, sore throat and trouble swallowing.    Eyes: Negative for visual disturbance.   Respiratory: Negative for cough, chest tightness, shortness of breath and wheezing.    Cardiovascular: Negative for chest pain, palpitations and leg swelling.   Gastrointestinal: Negative for abdominal pain, constipation, diarrhea, nausea and vomiting.   Genitourinary: Negative for dysuria, frequency and urgency.   Musculoskeletal: Positive for back pain. Negative for neck pain.   Skin: Negative for rash.   Neurological: Negative for dizziness, weakness and headaches.   Psychiatric/Behavioral:        Patient denies any feelings of depression and has not felt down, hopeless or lost interest in any activities.   All other systems reviewed and are negative.      Objective   Vitals:    04/01/21 0753   BP: 124/76   Pulse: 72   Temp: 98.7 °F (37.1 °C)   TempSrc: Oral   SpO2: 99%   Weight: 94.8 kg (209 lb)   Height: 182.9 cm (72\")   PainSc:   9 "   PainLoc: Hip  Comment: right side     Body mass index is 28.35 kg/m².  Physical Exam  Vitals and nursing note reviewed.   Constitutional:       General: He is not in acute distress.     Appearance: He is well-developed.   HENT:      Head: Normocephalic and atraumatic.      Nose: Nose normal.   Eyes:      General: No scleral icterus.     Conjunctiva/sclera: Conjunctivae normal.      Pupils: Pupils are equal, round, and reactive to light.   Cardiovascular:      Rate and Rhythm: Normal rate and regular rhythm.      Heart sounds: Normal heart sounds. No murmur heard.   No friction rub. No gallop.    Pulmonary:      Effort: Pulmonary effort is normal. No respiratory distress.      Breath sounds: Normal breath sounds. No wheezing or rales.   Musculoskeletal:         General: Normal range of motion.      Cervical back: Normal range of motion and neck supple.   Lymphadenopathy:      Cervical: No cervical adenopathy.   Skin:     General: Skin is warm and dry.      Findings: No rash.   Neurological:      Mental Status: He is alert and oriented to person, place, and time.      Cranial Nerves: No cranial nerve deficit.   Psychiatric:         Behavior: Behavior normal.         Thought Content: Thought content normal.         Judgment: Judgment normal.         Assessment/Plan   Diagnoses and all orders for this visit:    1. Right hip pain (Primary)    2. Sciatica of right side  -     gabapentin (NEURONTIN) 100 MG capsule; Take 1 capsule by mouth 3 (Three) Times a Day.  Dispense: 90 capsule; Refill: 1    3. Chronic renal impairment, stage 3a (CMS/HCC)    He is given gabapentin 100 mg 3 times a day for treatment of the sciatica.  He will let me know if this does not improve.  He should not take any additional NSAIDs due to his renal impairment.         PHQ-2/PHQ-9 Depression Screening 4/1/2021   Little interest or pleasure in doing things 0   Feeling down, depressed, or hopeless 0   Trouble falling or staying asleep, or sleeping  too much -   Feeling tired or having little energy -   Poor appetite or overeating -   Feeling bad about yourself - or that you are a failure or have let yourself or your family down -   Trouble concentrating on things, such as reading the newspaper or watching television -   Moving or speaking so slowly that other people could have noticed. Or the opposite - being so fidgety or restless that you have been moving around a lot more than usual -   Thoughts that you would be better off dead, or of hurting yourself in some way -   Total Score 0

## 2021-04-14 DIAGNOSIS — K21.9 GASTROESOPHAGEAL REFLUX DISEASE WITHOUT ESOPHAGITIS: Chronic | ICD-10-CM

## 2021-04-14 DIAGNOSIS — E03.9 ACQUIRED HYPOTHYROIDISM: Chronic | ICD-10-CM

## 2021-04-15 RX ORDER — PANTOPRAZOLE SODIUM 40 MG/1
TABLET, DELAYED RELEASE ORAL
Qty: 90 TABLET | Refills: 3 | Status: SHIPPED | OUTPATIENT
Start: 2021-04-15 | End: 2022-04-11

## 2021-04-15 RX ORDER — LEVOTHYROXINE SODIUM 0.03 MG/1
TABLET ORAL
Qty: 90 TABLET | Refills: 3 | Status: SHIPPED | OUTPATIENT
Start: 2021-04-15 | End: 2022-04-11

## 2021-05-04 ENCOUNTER — LAB (OUTPATIENT)
Dept: LAB | Facility: OTHER | Age: 81
End: 2021-05-04

## 2021-05-04 DIAGNOSIS — E03.9 ACQUIRED HYPOTHYROIDISM: Chronic | ICD-10-CM

## 2021-05-04 DIAGNOSIS — E11.8 TYPE 2 DIABETES MELLITUS WITH COMPLICATION, WITHOUT LONG-TERM CURRENT USE OF INSULIN (HCC): Chronic | ICD-10-CM

## 2021-05-04 DIAGNOSIS — I10 ESSENTIAL HYPERTENSION: ICD-10-CM

## 2021-05-04 DIAGNOSIS — Z12.5 SCREENING FOR PROSTATE CANCER: ICD-10-CM

## 2021-05-04 DIAGNOSIS — E78.2 MIXED HYPERLIPIDEMIA: Chronic | ICD-10-CM

## 2021-05-04 LAB
ALBUMIN SERPL-MCNC: 3.8 G/DL (ref 3.5–5)
ALBUMIN/GLOB SERPL: 1.5 G/DL (ref 1.1–1.8)
ALP SERPL-CCNC: 95 U/L (ref 38–126)
ALT SERPL W P-5'-P-CCNC: 22 U/L
ANION GAP SERPL CALCULATED.3IONS-SCNC: 11 MMOL/L (ref 5–15)
AST SERPL-CCNC: 30 U/L (ref 17–59)
BASOPHILS # BLD AUTO: 0.04 10*3/MM3 (ref 0–0.2)
BASOPHILS NFR BLD AUTO: 0.5 % (ref 0–1.5)
BILIRUB SERPL-MCNC: 0.7 MG/DL (ref 0.2–1.3)
BILIRUB UR QL STRIP: NEGATIVE
BUN SERPL-MCNC: 20 MG/DL (ref 7–23)
BUN/CREAT SERPL: 16.3 (ref 7–25)
CALCIUM SPEC-SCNC: 8.5 MG/DL (ref 8.4–10.2)
CHLORIDE SERPL-SCNC: 103 MMOL/L (ref 101–112)
CHOLEST SERPL-MCNC: 136 MG/DL (ref 150–200)
CLARITY UR: CLEAR
CO2 SERPL-SCNC: 24 MMOL/L (ref 22–30)
COLOR UR: YELLOW
CREAT SERPL-MCNC: 1.23 MG/DL (ref 0.7–1.3)
DEPRECATED RDW RBC AUTO: 46.5 FL (ref 37–54)
EOSINOPHIL # BLD AUTO: 0.3 10*3/MM3 (ref 0–0.4)
EOSINOPHIL NFR BLD AUTO: 3.9 % (ref 0.3–6.2)
ERYTHROCYTE [DISTWIDTH] IN BLOOD BY AUTOMATED COUNT: 14.6 % (ref 12.3–15.4)
GFR SERPL CREATININE-BSD FRML MDRD: 57 ML/MIN/1.73 (ref 42–98)
GLOBULIN UR ELPH-MCNC: 2.5 GM/DL (ref 2.3–3.5)
GLUCOSE SERPL-MCNC: 133 MG/DL (ref 70–99)
GLUCOSE UR STRIP-MCNC: NEGATIVE MG/DL
HBA1C MFR BLD: 6.29 % (ref 4.8–5.6)
HCT VFR BLD AUTO: 41.3 % (ref 37.5–51)
HDLC SERPL-MCNC: 32 MG/DL (ref 40–59)
HGB BLD-MCNC: 13.4 G/DL (ref 13–17.7)
HGB UR QL STRIP.AUTO: NEGATIVE
KETONES UR QL STRIP: NEGATIVE
LDLC SERPL CALC-MCNC: 80 MG/DL
LDLC/HDLC SERPL: 2.43 {RATIO} (ref 0–3.55)
LEUKOCYTE ESTERASE UR QL STRIP.AUTO: NEGATIVE
LYMPHOCYTES # BLD AUTO: 1.7 10*3/MM3 (ref 0.7–3.1)
LYMPHOCYTES NFR BLD AUTO: 21.8 % (ref 19.6–45.3)
MCH RBC QN AUTO: 28.8 PG (ref 26.6–33)
MCHC RBC AUTO-ENTMCNC: 32.4 G/DL (ref 31.5–35.7)
MCV RBC AUTO: 88.6 FL (ref 79–97)
MONOCYTES # BLD AUTO: 1.05 10*3/MM3 (ref 0.1–0.9)
MONOCYTES NFR BLD AUTO: 13.5 % (ref 5–12)
NEUTROPHILS NFR BLD AUTO: 4.7 10*3/MM3 (ref 1.7–7)
NEUTROPHILS NFR BLD AUTO: 60.3 % (ref 42.7–76)
NITRITE UR QL STRIP: NEGATIVE
PH UR STRIP.AUTO: 6.5 [PH] (ref 5.5–8)
PLATELET # BLD AUTO: 195 10*3/MM3 (ref 140–450)
PMV BLD AUTO: 11.4 FL (ref 6–12)
POTASSIUM SERPL-SCNC: 4 MMOL/L (ref 3.4–5)
PROT SERPL-MCNC: 6.3 G/DL (ref 6.3–8.6)
PROT UR QL STRIP: NEGATIVE
PSA SERPL-MCNC: 1.66 NG/ML (ref 0–4)
RBC # BLD AUTO: 4.66 10*6/MM3 (ref 4.14–5.8)
SODIUM SERPL-SCNC: 138 MMOL/L (ref 137–145)
SP GR UR STRIP: 1.01 (ref 1–1.03)
T4 FREE SERPL-MCNC: 1.27 NG/DL (ref 0.93–1.7)
TRIGL SERPL-MCNC: 131 MG/DL
TSH SERPL DL<=0.05 MIU/L-ACNC: 4.18 UIU/ML (ref 0.27–4.2)
UROBILINOGEN UR QL STRIP: NORMAL
VLDLC SERPL-MCNC: 24 MG/DL (ref 5–40)
WBC # BLD AUTO: 7.79 10*3/MM3 (ref 3.4–10.8)

## 2021-05-04 PROCEDURE — G0103 PSA SCREENING: HCPCS | Performed by: INTERNAL MEDICINE

## 2021-05-04 PROCEDURE — 36415 COLL VENOUS BLD VENIPUNCTURE: CPT | Performed by: INTERNAL MEDICINE

## 2021-05-04 PROCEDURE — 80053 COMPREHEN METABOLIC PANEL: CPT | Performed by: INTERNAL MEDICINE

## 2021-05-04 PROCEDURE — 83036 HEMOGLOBIN GLYCOSYLATED A1C: CPT | Performed by: INTERNAL MEDICINE

## 2021-05-04 PROCEDURE — 84439 ASSAY OF FREE THYROXINE: CPT | Performed by: INTERNAL MEDICINE

## 2021-05-04 PROCEDURE — 85025 COMPLETE CBC W/AUTO DIFF WBC: CPT | Performed by: INTERNAL MEDICINE

## 2021-05-04 PROCEDURE — 80061 LIPID PANEL: CPT | Performed by: INTERNAL MEDICINE

## 2021-05-04 PROCEDURE — 81003 URINALYSIS AUTO W/O SCOPE: CPT | Performed by: INTERNAL MEDICINE

## 2021-05-04 PROCEDURE — 84443 ASSAY THYROID STIM HORMONE: CPT | Performed by: INTERNAL MEDICINE

## 2021-05-10 ENCOUNTER — OFFICE VISIT (OUTPATIENT)
Dept: FAMILY MEDICINE CLINIC | Facility: CLINIC | Age: 81
End: 2021-05-10

## 2021-05-10 VITALS
TEMPERATURE: 97.6 F | SYSTOLIC BLOOD PRESSURE: 134 MMHG | WEIGHT: 209 LBS | DIASTOLIC BLOOD PRESSURE: 82 MMHG | BODY MASS INDEX: 28.31 KG/M2 | HEIGHT: 72 IN | OXYGEN SATURATION: 99 % | HEART RATE: 70 BPM

## 2021-05-10 DIAGNOSIS — K21.9 GASTROESOPHAGEAL REFLUX DISEASE WITHOUT ESOPHAGITIS: Chronic | ICD-10-CM

## 2021-05-10 DIAGNOSIS — E11.8 TYPE 2 DIABETES MELLITUS WITH COMPLICATION, WITHOUT LONG-TERM CURRENT USE OF INSULIN (HCC): Primary | Chronic | ICD-10-CM

## 2021-05-10 DIAGNOSIS — E03.9 ACQUIRED HYPOTHYROIDISM: Chronic | ICD-10-CM

## 2021-05-10 DIAGNOSIS — N40.1 BENIGN NON-NODULAR PROSTATIC HYPERPLASIA WITH LOWER URINARY TRACT SYMPTOMS: Chronic | ICD-10-CM

## 2021-05-10 DIAGNOSIS — E78.2 MIXED HYPERLIPIDEMIA: Chronic | ICD-10-CM

## 2021-05-10 DIAGNOSIS — I10 ESSENTIAL HYPERTENSION: Chronic | ICD-10-CM

## 2021-05-10 DIAGNOSIS — I11.0 HYPERTENSIVE HEART DISEASE WITH HEART FAILURE (HCC): Chronic | ICD-10-CM

## 2021-05-10 DIAGNOSIS — I25.10 CORONARY ARTERY DISEASE INVOLVING NATIVE CORONARY ARTERY OF NATIVE HEART WITHOUT ANGINA PECTORIS: Chronic | ICD-10-CM

## 2021-05-10 DIAGNOSIS — N18.31 CHRONIC RENAL IMPAIRMENT, STAGE 3A (HCC): Chronic | ICD-10-CM

## 2021-05-10 PROCEDURE — 99214 OFFICE O/P EST MOD 30 MIN: CPT | Performed by: INTERNAL MEDICINE

## 2021-05-10 NOTE — PROGRESS NOTES
Chief Complaint   Patient presents with   • Hypertension     6 month f/u on labs    • Hyperlipidemia   • right ear     patient states that is having issues with right ear      Subjective   Darwin Fraga is a 81 y.o. male who presents to the office for follow-up and review of labs.  He has diabetes, and his blood sugar has been well controlled.  He has been compliant with his diabetic medications and diet.  He has hypertension and his blood pressure has been controlled.  He has hypothyroidism and takes Synthroid, 25 mcg daily.  He has osteoarthritis and takes naproxen as needed.  He uses this sparingly due to renal insufficiency.  He has chronic renal impairment, stage IIIb.  His baseline creatinine runs around 1.4-1.5.  He has BPH and takes Flomax daily.  His urinary symptoms are currently well managed.  He has GERD and takes Protonix daily.  He has diabetic peripheral neuropathy, but does not require medication for this.  He denies any significant neuropathic pain.  He has coronary artery disease and hypertensive heart disease with heart failure.  He follows with cardiology for management of these.    I saw him last month for a flareup in sciatica of the right hip.  He was given gabapentin.  This has helped with his sciatica symptoms, although he states that it is not as effective as when he took it a number of years ago.  He continues to take 100 mg 3 times daily.    He has occasional pressure in his right ear.  This is usually present upon awakening.  He has been using earwax softener drops which has helped.  The pressure resolves after he takes a shower each morning and washes out the ear.  He is wanting me to examine the ear today to see if it needs to be irrigated.    History of Present Illness has been reviewed and validated on 05/10/2021and updated with any changes.      The following portions of the patient's history were reviewed and updated as appropriate: allergies, current medications, past  "family history, past medical history, past social history, past surgical history and problem list.    Review of Systems   Constitutional: Negative for chills, fatigue and fever.   HENT: Negative for congestion, sneezing, sore throat and trouble swallowing.    Eyes: Negative for visual disturbance.   Respiratory: Negative for cough, chest tightness, shortness of breath and wheezing.    Cardiovascular: Negative for chest pain, palpitations and leg swelling.   Gastrointestinal: Negative for abdominal pain, constipation, diarrhea, nausea and vomiting.   Genitourinary: Negative for dysuria, frequency and urgency.   Musculoskeletal: Negative for neck pain.   Skin: Negative for rash.   Neurological: Negative for dizziness, weakness and headaches.   Psychiatric/Behavioral:        Patient denies any feelings of depression and has not felt down, hopeless or lost interest in any activities.   All other systems reviewed and are negative.  Review of systems has been reviewed and validated on 05/10/2021and updated with any changes.    Objective    Vitals:    05/10/21 1028   BP: 134/82   Pulse: 70   Temp: 97.6 °F (36.4 °C)   TempSrc: Oral   SpO2: 99%   Weight: 94.8 kg (209 lb)   Height: 182.9 cm (72\")   PainSc: 0-No pain   PainLoc: Generalized      Body mass index is 28.35 kg/m².      Physical Exam   Constitutional: He is oriented to person, place, and time. He appears well-developed. No distress.   HENT:   Head: Normocephalic and atraumatic.   Nose: Nose normal.   Eyes: Pupils are equal, round, and reactive to light. Conjunctivae are normal. No scleral icterus.   Cardiovascular: Normal rate, regular rhythm and normal heart sounds. Exam reveals no gallop and no friction rub.   No murmur heard.  Pulmonary/Chest: Effort normal and breath sounds normal. No respiratory distress. He has no wheezes. He has no rales.   Musculoskeletal: Normal range of motion.   Neurological: He is alert and oriented to person, place, and time. No cranial " nerve deficit.   Skin: Skin is warm and dry. No rash noted.   Psychiatric: His behavior is normal. Judgment and thought content normal.   Nursing note and vitals reviewed.  Physical exam has been reviewed and validated on 05/10/2021and updated with any changes.    Assessment/Plan             Diagnoses and all orders for this visit:    1. Type 2 diabetes mellitus with complication, without long-term current use of insulin (CMS/[facility]) (Primary)  -     Hemoglobin A1c; Future    2. Essential hypertension  -     CBC & Differential; Future  -     Comprehensive Metabolic Panel; Future  -     Urinalysis With Culture If Indicated -; Future    3. Mixed hyperlipidemia  -     LDL Cholesterol, Direct; Future    4. Acquired hypothyroidism  -     T4, Free; Future  -     TSH; Future    5. Gastroesophageal reflux disease without esophagitis    6. Chronic renal impairment, stage 3a (CMS/[facility])    7. Benign non-nodular prostatic hyperplasia with lower urinary tract symptoms    8. Hypertensive heart disease with heart failure (CMS/[facility])    9. Coronary artery disease involving native coronary artery of native heart without angina pectoris         Labs are reviewed with patient.  His glucose is 133.  His hemoglobin A1c is 6.29.  His diabetes is well controlled.  He will continue with his current diabetic medication.  He may monitor blood sugar 1 time daily.  His creatinine is normal and baseline at 1.23.  His total cholesterol is 136, LDL 80 and triglycerides 131.  He will continue with pravastatin 40 mg daily for treatment of hyperlipidemia. His TSH is normal and he will continue with Synthroid 25 mcg daily for treatment of hypothyroidism.  His PSA is normal at 1.66.  He will continue with Flomax for treatment of BPH.  He will continue with Protonix for treatment of GERD.  His blood pressure is controlled, and he will continue with his current blood pressure medication.  He will continue to follow with cardiology for management of the  coronary artery disease and hypertensive heart disease with heart failure.  His ear exam is normal.  He will continue to use the earwax softener drops as needed.  No other treatment is indicated at this time.  He will continue with gabapentin 100 mg 3 times daily for treatment of the right-sided sciatica.  If this does not resolve, or if the gabapentin becomes ineffective at the current dose, he will let me know.    Patient understands the risks associated with this controlled medication, including tolerance and addiction.  Patient also agrees to only obtain this medication from me, and not from a another provider, unless that provider is covering for me in my absence.  Patient also agrees to be compliant in dosing, and not self adjust the dose of medication.  A signed controlled substance agreement is on file, and the patient has received a controlled substance education sheet at this or a previous visit.  The patient has also signed a consent for treatment with a controlled substance as per [facility] policy. FAMILIA is obtained.    PHQ-2/PHQ-9 Depression Screening 5/10/2021   Little interest or pleasure in doing things 0   Feeling down, depressed, or hopeless 0   Trouble falling or staying asleep, or sleeping too much -   Feeling tired or having little energy -   Poor appetite or overeating -   Feeling bad about yourself - or that you are a failure or have let yourself or your family down -   Trouble concentrating on things, such as reading the newspaper or watching television -   Moving or speaking so slowly that other people could have noticed. Or the opposite - being so fidgety or restless that you have been moving around a lot more than usual -   Thoughts that you would be better off dead, or of hurting yourself in some way -   Total Score 0       Lab on 05/04/2021   Component Date Value Ref Range Status   • Glucose 05/04/2021 133* 70 - 99 mg/dL Final   • BUN 05/04/2021 20  7 - 23 mg/dL Final   •  Creatinine 05/04/2021 1.23  0.70 - 1.30 mg/dL Final   • Sodium 05/04/2021 138  137 - 145 mmol/L Final   • Potassium 05/04/2021 4.0  3.4 - 5.0 mmol/L Final   • Chloride 05/04/2021 103  101 - 112 mmol/L Final   • CO2 05/04/2021 24.0  22.0 - 30.0 mmol/L Final   • Calcium 05/04/2021 8.5  8.4 - 10.2 mg/dL Final   • Total Protein 05/04/2021 6.3  6.3 - 8.6 g/dL Final   • Albumin 05/04/2021 3.80  3.50 - 5.00 g/dL Final   • ALT (SGPT) 05/04/2021 22  <=50 U/L Final   • AST (SGOT) 05/04/2021 30  17 - 59 U/L Final   • Alkaline Phosphatase 05/04/2021 95  38 - 126 U/L Final   • Total Bilirubin 05/04/2021 0.7  0.2 - 1.3 mg/dL Final   • eGFR Non African Amer 05/04/2021 57  42 - 98 mL/min/1.73 Final   • Globulin 05/04/2021 2.5  2.3 - 3.5 gm/dL Final   • A/G Ratio 05/04/2021 1.5  1.1 - 1.8 g/dL Final   • BUN/Creatinine Ratio 05/04/2021 16.3  7.0 - 25.0 Final   • Anion Gap 05/04/2021 11.0  5.0 - 15.0 mmol/L Final   • Free T4 05/04/2021 1.27  0.93 - 1.70 ng/dL Final   • TSH 05/04/2021 4.180  0.270 - 4.200 uIU/mL Final   • Color, UA 05/04/2021 Yellow  Yellow, Straw Final   • Appearance, UA 05/04/2021 Clear  Clear Final   • pH, UA 05/04/2021 6.5  5.5 - 8.0 Final   • Specific Gravity, UA 05/04/2021 1.015  1.005 - 1.030 Final   • Glucose, UA 05/04/2021 Negative  Negative Final   • Ketones, UA 05/04/2021 Negative  Negative Final   • Bilirubin, UA 05/04/2021 Negative  Negative Final   • Blood, UA 05/04/2021 Negative  Negative Final   • Protein, UA 05/04/2021 Negative  Negative Final   • Leuk Esterase, UA 05/04/2021 Negative  Negative Final   • Nitrite, UA 05/04/2021 Negative  Negative Final   • Urobilinogen, UA 05/04/2021 0.2 E.U./dL  0.2 - 1.0 E.U./dL Final   • Hemoglobin A1C 05/04/2021 6.29* 4.80 - 5.60 % Final   • Total Cholesterol 05/04/2021 136* 150 - 200 mg/dL Final   • Triglycerides 05/04/2021 131  <=150 mg/dL Final   • HDL Cholesterol 05/04/2021 32* 40 - 59 mg/dL Final   • LDL Cholesterol  05/04/2021 80  <=100 mg/dL Final   • VLDL  Cholesterol 05/04/2021 24  5 - 40 mg/dL Final   • LDL/HDL Ratio 05/04/2021 2.43  0.00 - 3.55 Final   • PSA 05/04/2021 1.660  0.000 - 4.000 ng/mL Final   • WBC 05/04/2021 7.79  3.40 - 10.80 10*3/mm3 Final   • RBC 05/04/2021 4.66  4.14 - 5.80 10*6/mm3 Final   • Hemoglobin 05/04/2021 13.4  13.0 - 17.7 g/dL Final   • Hematocrit 05/04/2021 41.3  37.5 - 51.0 % Final   • MCV 05/04/2021 88.6  79.0 - 97.0 fL Final   • MCH 05/04/2021 28.8  26.6 - 33.0 pg Final   • MCHC 05/04/2021 32.4  31.5 - 35.7 g/dL Final   • RDW 05/04/2021 14.6  12.3 - 15.4 % Final   • RDW-SD 05/04/2021 46.5  37.0 - 54.0 fl Final   • MPV 05/04/2021 11.4  6.0 - 12.0 fL Final   • Platelets 05/04/2021 195  140 - 450 10*3/mm3 Final   • Neutrophil % 05/04/2021 60.3  42.7 - 76.0 % Final   • Lymphocyte % 05/04/2021 21.8  19.6 - 45.3 % Final   • Monocyte % 05/04/2021 13.5* 5.0 - 12.0 % Final   • Eosinophil % 05/04/2021 3.9  0.3 - 6.2 % Final   • Basophil % 05/04/2021 0.5  0.0 - 1.5 % Final   • Neutrophils, Absolute 05/04/2021 4.70  1.70 - 7.00 10*3/mm3 Final   • Lymphocytes, Absolute 05/04/2021 1.70  0.70 - 3.10 10*3/mm3 Final   • Monocytes, Absolute 05/04/2021 1.05* 0.10 - 0.90 10*3/mm3 Final   • Eosinophils, Absolute 05/04/2021 0.30  0.00 - 0.40 10*3/mm3 Final   • Basophils, Absolute 05/04/2021 0.04  0.00 - 0.20 10*3/mm3 Final   ]

## 2021-07-14 DIAGNOSIS — I10 ESSENTIAL HYPERTENSION: Chronic | ICD-10-CM

## 2021-07-15 RX ORDER — LABETALOL 200 MG/1
TABLET, FILM COATED ORAL
Qty: 180 TABLET | Refills: 3 | Status: SHIPPED | OUTPATIENT
Start: 2021-07-15 | End: 2021-12-10

## 2021-08-30 DIAGNOSIS — I10 ESSENTIAL HYPERTENSION: Chronic | ICD-10-CM

## 2021-08-30 RX ORDER — AMLODIPINE BESYLATE 10 MG/1
TABLET ORAL
Qty: 90 TABLET | Refills: 3 | Status: ON HOLD | OUTPATIENT
Start: 2021-08-30 | End: 2022-05-03

## 2021-09-27 DIAGNOSIS — M54.31 SCIATICA OF RIGHT SIDE: ICD-10-CM

## 2021-09-27 RX ORDER — GABAPENTIN 100 MG/1
100 CAPSULE ORAL 3 TIMES DAILY
Qty: 90 CAPSULE | Refills: 1 | Status: ON HOLD | OUTPATIENT
Start: 2021-09-27 | End: 2022-05-03

## 2021-10-21 DIAGNOSIS — E78.2 MIXED HYPERLIPIDEMIA: Chronic | ICD-10-CM

## 2021-10-21 RX ORDER — PRAVASTATIN SODIUM 40 MG
TABLET ORAL
Qty: 90 TABLET | Refills: 3 | Status: SHIPPED | OUTPATIENT
Start: 2021-10-21

## 2021-11-03 ENCOUNTER — LAB (OUTPATIENT)
Dept: LAB | Facility: OTHER | Age: 81
End: 2021-11-03

## 2021-11-03 DIAGNOSIS — E78.2 MIXED HYPERLIPIDEMIA: Chronic | ICD-10-CM

## 2021-11-03 DIAGNOSIS — E03.9 ACQUIRED HYPOTHYROIDISM: Chronic | ICD-10-CM

## 2021-11-03 DIAGNOSIS — E11.8 TYPE 2 DIABETES MELLITUS WITH COMPLICATION, WITHOUT LONG-TERM CURRENT USE OF INSULIN (HCC): Chronic | ICD-10-CM

## 2021-11-03 DIAGNOSIS — I10 ESSENTIAL HYPERTENSION: ICD-10-CM

## 2021-11-03 LAB
ALBUMIN SERPL-MCNC: 4.1 G/DL (ref 3.5–5)
ALBUMIN/GLOB SERPL: 1.4 G/DL (ref 1.1–1.8)
ALP SERPL-CCNC: 70 U/L (ref 38–126)
ALT SERPL W P-5'-P-CCNC: 16 U/L
ANION GAP SERPL CALCULATED.3IONS-SCNC: 7 MMOL/L (ref 5–15)
ARTICHOKE IGE QN: 75 MG/DL (ref 0–100)
AST SERPL-CCNC: 22 U/L (ref 17–59)
BASOPHILS # BLD AUTO: 0.06 10*3/MM3 (ref 0–0.2)
BASOPHILS NFR BLD AUTO: 0.7 % (ref 0–1.5)
BILIRUB SERPL-MCNC: 0.7 MG/DL (ref 0.2–1.3)
BILIRUB UR QL STRIP: NEGATIVE
BUN SERPL-MCNC: 25 MG/DL (ref 7–23)
BUN/CREAT SERPL: 18.8 (ref 7–25)
CALCIUM SPEC-SCNC: 9.4 MG/DL (ref 8.4–10.2)
CHLORIDE SERPL-SCNC: 105 MMOL/L (ref 101–112)
CLARITY UR: CLEAR
CO2 SERPL-SCNC: 26 MMOL/L (ref 22–30)
COLOR UR: YELLOW
CREAT SERPL-MCNC: 1.33 MG/DL (ref 0.7–1.3)
DEPRECATED RDW RBC AUTO: 46.2 FL (ref 37–54)
EOSINOPHIL # BLD AUTO: 0.23 10*3/MM3 (ref 0–0.4)
EOSINOPHIL NFR BLD AUTO: 2.6 % (ref 0.3–6.2)
ERYTHROCYTE [DISTWIDTH] IN BLOOD BY AUTOMATED COUNT: 14.5 % (ref 12.3–15.4)
GFR SERPL CREATININE-BSD FRML MDRD: 52 ML/MIN/1.73 (ref 42–98)
GLOBULIN UR ELPH-MCNC: 3 GM/DL (ref 2.3–3.5)
GLUCOSE SERPL-MCNC: 144 MG/DL (ref 70–99)
GLUCOSE UR STRIP-MCNC: NEGATIVE MG/DL
HBA1C MFR BLD: 6.5 % (ref 4.8–5.6)
HCT VFR BLD AUTO: 41.8 % (ref 37.5–51)
HGB BLD-MCNC: 13.4 G/DL (ref 13–17.7)
HGB UR QL STRIP.AUTO: NEGATIVE
KETONES UR QL STRIP: NEGATIVE
LEUKOCYTE ESTERASE UR QL STRIP.AUTO: NEGATIVE
LYMPHOCYTES # BLD AUTO: 1.97 10*3/MM3 (ref 0.7–3.1)
LYMPHOCYTES NFR BLD AUTO: 22.5 % (ref 19.6–45.3)
MCH RBC QN AUTO: 28.3 PG (ref 26.6–33)
MCHC RBC AUTO-ENTMCNC: 32.1 G/DL (ref 31.5–35.7)
MCV RBC AUTO: 88.4 FL (ref 79–97)
MONOCYTES # BLD AUTO: 1.25 10*3/MM3 (ref 0.1–0.9)
MONOCYTES NFR BLD AUTO: 14.3 % (ref 5–12)
NEUTROPHILS NFR BLD AUTO: 5.26 10*3/MM3 (ref 1.7–7)
NEUTROPHILS NFR BLD AUTO: 59.9 % (ref 42.7–76)
NITRITE UR QL STRIP: NEGATIVE
PH UR STRIP.AUTO: 6 [PH] (ref 5.5–8)
PLATELET # BLD AUTO: 237 10*3/MM3 (ref 140–450)
PMV BLD AUTO: 11.4 FL (ref 6–12)
POTASSIUM SERPL-SCNC: 4 MMOL/L (ref 3.4–5)
PROT SERPL-MCNC: 7.1 G/DL (ref 6.3–8.6)
PROT UR QL STRIP: NEGATIVE
RBC # BLD AUTO: 4.73 10*6/MM3 (ref 4.14–5.8)
SODIUM SERPL-SCNC: 138 MMOL/L (ref 137–145)
SP GR UR STRIP: 1.02 (ref 1–1.03)
T4 FREE SERPL-MCNC: 1.32 NG/DL (ref 0.93–1.7)
TSH SERPL DL<=0.05 MIU/L-ACNC: 3.94 UIU/ML (ref 0.27–4.2)
UROBILINOGEN UR QL STRIP: NORMAL
WBC # BLD AUTO: 8.77 10*3/MM3 (ref 3.4–10.8)

## 2021-11-03 PROCEDURE — 83036 HEMOGLOBIN GLYCOSYLATED A1C: CPT | Performed by: INTERNAL MEDICINE

## 2021-11-03 PROCEDURE — 84443 ASSAY THYROID STIM HORMONE: CPT | Performed by: INTERNAL MEDICINE

## 2021-11-03 PROCEDURE — 80053 COMPREHEN METABOLIC PANEL: CPT | Performed by: INTERNAL MEDICINE

## 2021-11-03 PROCEDURE — 36415 COLL VENOUS BLD VENIPUNCTURE: CPT | Performed by: INTERNAL MEDICINE

## 2021-11-03 PROCEDURE — 85025 COMPLETE CBC W/AUTO DIFF WBC: CPT | Performed by: INTERNAL MEDICINE

## 2021-11-03 PROCEDURE — 81003 URINALYSIS AUTO W/O SCOPE: CPT | Performed by: INTERNAL MEDICINE

## 2021-11-03 PROCEDURE — 84439 ASSAY OF FREE THYROXINE: CPT | Performed by: INTERNAL MEDICINE

## 2021-11-03 PROCEDURE — 83721 ASSAY OF BLOOD LIPOPROTEIN: CPT | Performed by: INTERNAL MEDICINE

## 2021-11-11 PROBLEM — I44.2 ATRIOVENTRICULAR BLOCK, COMPLETE (HCC): Chronic | Status: ACTIVE | Noted: 2021-11-11

## 2021-11-11 NOTE — PROGRESS NOTES
Chief Complaint   Patient presents with   • Medicare Wellness-subsequent   • Hypertension   • Hyperlipidemia   • Diabetes   • Hypothyroidism   • Heartburn   • Benign Prostatic Hypertrophy   • Osteoarthritis   • Chronic renal impairment   • Atrioventicular block     Subjective   Darwin Fraga is a 81 y.o. male who presents to the office for follow-up and review of labs.  He has diabetes, and his blood sugar has been well controlled.  He has been compliant with his diabetic medications and diet.  Occasionally during the night, his blood sugar is dropping.  He is taking glimepiride 4 mg twice daily in addition to his Janumet.  He has hypertension and his blood pressure has been controlled.  He has hypothyroidism and takes Synthroid, 25 mcg daily.  He has osteoarthritis and takes naproxen as needed.  He uses this sparingly due to renal insufficiency.  He has chronic renal impairment, stage IIIb.  His baseline creatinine runs around 1.4-1.5.  He has BPH and takes Flomax 0.4 mg daily.  His urinary symptoms are currently well managed.  He has GERD and takes Protonix 40 mg daily.  He has diabetic peripheral neuropathy, but does not require medication for this.  He takes gabapentin 100 mg 3 times daily for treatment of sciatic pain.  He has coronary artery disease and hypertensive heart disease with heart failure.  He also has complete AV block.  He follows with cardiology for management of these, and they have been baseline.    History of Present Illness has been reviewed and validated on 11/12/2021 and updated with any changes.    The following portions of the patient's history were reviewed and updated as appropriate: allergies, current medications, past family history, past medical history, past social history, past surgical history and problem list.    Review of Systems   Constitutional: Negative for chills, fatigue and fever.   HENT: Negative for congestion, sneezing, sore throat and trouble swallowing.   "  Eyes: Negative for visual disturbance.   Respiratory: Negative for cough, chest tightness, shortness of breath and wheezing.    Cardiovascular: Negative for chest pain, palpitations and leg swelling.   Gastrointestinal: Negative for abdominal pain, constipation, diarrhea, nausea and vomiting.   Genitourinary: Negative for dysuria, frequency and urgency.   Musculoskeletal: Negative for neck pain.   Skin: Negative for rash.   Neurological: Negative for dizziness, weakness and headaches.   Psychiatric/Behavioral:        Patient denies any feelings of depression and has not felt down, hopeless or lost interest in any activities.   All other systems reviewed and are negative.  Review of systems has been reviewed and validated on 11/12/2021 and updated with any changes.    Objective    Vitals:    11/12/21 1032   BP: 122/68   BP Location: Left arm   Patient Position: Sitting   Cuff Size: Large Adult   Pulse: 75   Temp: 96.4 °F (35.8 °C)   TempSrc: Tympanic   SpO2: 97%   Weight: 95.7 kg (211 lb)   Height: 182.9 cm (72\")   PainSc: 0-No pain      Body mass index is 28.62 kg/m².      Physical Exam   Constitutional: He is oriented to person, place, and time. He appears well-developed. No distress.   HENT:   Head: Normocephalic and atraumatic.   Nose: Nose normal.   Eyes: Pupils are equal, round, and reactive to light. Conjunctivae are normal. No scleral icterus.   Cardiovascular: Normal rate, regular rhythm and normal heart sounds. Exam reveals no gallop and no friction rub.   No murmur heard.  Pulmonary/Chest: Effort normal and breath sounds normal. No respiratory distress. He has no wheezes. He has no rales.   Musculoskeletal: Normal range of motion.   Neurological: He is alert and oriented to person, place, and time. No cranial nerve deficit.   Skin: Skin is warm and dry. No rash noted.   Psychiatric: His behavior is normal. Judgment and thought content normal.   Nursing note and vitals reviewed.  Physical exam has been " reviewed and validated on 11/12/2021 and updated with any changes.    Assessment/Plan             Diagnoses and all orders for this visit:    1. Medicare annual wellness visit, subsequent (Primary)    2. Type 2 diabetes mellitus with complication, without long-term current use of insulin (HCC)  -     Hemoglobin A1c; Future  -     Microalbumin / Creatinine Urine Ratio - Urine, Clean Catch; Future    3. Essential hypertension  -     CBC & Differential; Future  -     Comprehensive Metabolic Panel; Future  -     Urinalysis With Culture If Indicated -; Future    4. Mixed hyperlipidemia  -     Lipid Panel; Future    5. Acquired hypothyroidism  -     T4, Free; Future  -     TSH; Future    6. Gastroesophageal reflux disease without esophagitis    7. Benign non-nodular prostatic hyperplasia with lower urinary tract symptoms    8. Chronic renal impairment, stage 3a ([facility])    9. Hypertensive heart disease with heart failure ([facility])    10. Atrioventricular block, complete ([facility])    11. Screening for prostate cancer  -     PSA Screen; Future         Labs are reviewed with patient.  His glucose is 144.  His hemoglobin A1c is 6.50.  His diabetes is well controlled.  He will continue with his current diabetic medication.  He will reduce the glimepiride to 1 tablet in the morning and 1/2 tablet at suppertime.  By lowering the dose and changing the timing to his evening meal instead of when he goes to bed, this should help stop the hypoglycemic episodes which are occurring at night.  He may monitor blood sugar 1 time daily.  His creatinine is baseline at 1.33.  His LDL cholesterol is 75.  He will continue with pravastatin 40 mg daily for treatment of hyperlipidemia. His TSH is normal at 3.94.  He will continue with Synthroid 25 mcg daily for treatment of hypothyroidism.  He will continue with Flomax 0.4 mg daily for treatment of BPH.  He will continue with Protonix 40 mg daily for treatment of GERD.  His blood pressure is controlled, and  he will continue with his current blood pressure medication.  He will continue to follow with cardiology for management of the coronary artery disease and hypertensive heart disease with heart failure. He will continue with gabapentin 100 mg 3 times daily for treatment of the right-sided sciatica.     Patient understands the risks associated with this controlled medication, including tolerance and addiction.  Patient also agrees to only obtain this medication from me, and not from a another provider, unless that provider is covering for me in my absence.  Patient also agrees to be compliant in dosing, and not self adjust the dose of medication.  A signed controlled substance agreement is on file, and the patient has received a controlled substance education sheet at this or a previous visit.  The patient has also signed a consent for treatment with a controlled substance as per [facility] policy. FAMILIA is obtained.    PHQ-2/PHQ-9 Depression Screening 11/12/2021   Little interest or pleasure in doing things 0   Feeling down, depressed, or hopeless 0   Trouble falling or staying asleep, or sleeping too much -   Feeling tired or having little energy -   Poor appetite or overeating -   Feeling bad about yourself - or that you are a failure or have let yourself or your family down -   Trouble concentrating on things, such as reading the newspaper or watching television -   Moving or speaking so slowly that other people could have noticed. Or the opposite - being so fidgety or restless that you have been moving around a lot more than usual -   Thoughts that you would be better off dead, or of hurting yourself in some way -   Total Score 0       Lab on 11/03/2021   Component Date Value Ref Range Status   • Glucose 11/03/2021 144* 70 - 99 mg/dL Final   • BUN 11/03/2021 25* 7 - 23 mg/dL Final   • Creatinine 11/03/2021 1.33* 0.70 - 1.30 mg/dL Final   • Sodium 11/03/2021 138  137 - 145 mmol/L Final   • Potassium  11/03/2021 4.0  3.4 - 5.0 mmol/L Final   • Chloride 11/03/2021 105  101 - 112 mmol/L Final   • CO2 11/03/2021 26.0  22.0 - 30.0 mmol/L Final   • Calcium 11/03/2021 9.4  8.4 - 10.2 mg/dL Final   • Total Protein 11/03/2021 7.1  6.3 - 8.6 g/dL Final   • Albumin 11/03/2021 4.10  3.50 - 5.00 g/dL Final   • ALT (SGPT) 11/03/2021 16  <=50 U/L Final   • AST (SGOT) 11/03/2021 22  17 - 59 U/L Final   • Alkaline Phosphatase 11/03/2021 70  38 - 126 U/L Final   • Total Bilirubin 11/03/2021 0.7  0.2 - 1.3 mg/dL Final   • eGFR Non African Amer 11/03/2021 52  42 - 98 mL/min/1.73 Final   • Globulin 11/03/2021 3.0  2.3 - 3.5 gm/dL Final   • A/G Ratio 11/03/2021 1.4  1.1 - 1.8 g/dL Final   • BUN/Creatinine Ratio 11/03/2021 18.8  7.0 - 25.0 Final   • Anion Gap 11/03/2021 7.0  5.0 - 15.0 mmol/L Final   • Free T4 11/03/2021 1.32  0.93 - 1.70 ng/dL Final   • TSH 11/03/2021 3.940  0.270 - 4.200 uIU/mL Final   • Color, UA 11/03/2021 Yellow  Yellow, Straw Final   • Appearance, UA 11/03/2021 Clear  Clear Final   • pH, UA 11/03/2021 6.0  5.5 - 8.0 Final   • Specific Gravity, UA 11/03/2021 1.025  1.005 - 1.030 Final   • Glucose, UA 11/03/2021 Negative  Negative Final   • Ketones, UA 11/03/2021 Negative  Negative Final   • Bilirubin, UA 11/03/2021 Negative  Negative Final   • Blood, UA 11/03/2021 Negative  Negative Final   • Protein, UA 11/03/2021 Negative  Negative Final   • Leuk Esterase, UA 11/03/2021 Negative  Negative Final   • Nitrite, UA 11/03/2021 Negative  Negative Final   • Urobilinogen, UA 11/03/2021 0.2 E.U./dL  0.2 - 1.0 E.U./dL Final   • Hemoglobin A1C 11/03/2021 6.50* 4.80 - 5.60 % Final   • LDL Cholesterol  11/03/2021 75  0 - 100 mg/dL Final   • WBC 11/03/2021 8.77  3.40 - 10.80 10*3/mm3 Final   • RBC 11/03/2021 4.73  4.14 - 5.80 10*6/mm3 Final   • Hemoglobin 11/03/2021 13.4  13.0 - 17.7 g/dL Final   • Hematocrit 11/03/2021 41.8  37.5 - 51.0 % Final   • MCV 11/03/2021 88.4  79.0 - 97.0 fL Final   • MCH 11/03/2021 28.3  26.6 -  33.0 pg Final   • MCHC 11/03/2021 32.1  31.5 - 35.7 g/dL Final   • RDW 11/03/2021 14.5  12.3 - 15.4 % Final   • RDW-SD 11/03/2021 46.2  37.0 - 54.0 fl Final   • MPV 11/03/2021 11.4  6.0 - 12.0 fL Final   • Platelets 11/03/2021 237  140 - 450 10*3/mm3 Final   • Neutrophil % 11/03/2021 59.9  42.7 - 76.0 % Final   • Lymphocyte % 11/03/2021 22.5  19.6 - 45.3 % Final   • Monocyte % 11/03/2021 14.3* 5.0 - 12.0 % Final   • Eosinophil % 11/03/2021 2.6  0.3 - 6.2 % Final   • Basophil % 11/03/2021 0.7  0.0 - 1.5 % Final   • Neutrophils, Absolute 11/03/2021 5.26  1.70 - 7.00 10*3/mm3 Final   • Lymphocytes, Absolute 11/03/2021 1.97  0.70 - 3.10 10*3/mm3 Final   • Monocytes, Absolute 11/03/2021 1.25* 0.10 - 0.90 10*3/mm3 Final   • Eosinophils, Absolute 11/03/2021 0.23  0.00 - 0.40 10*3/mm3 Final   • Basophils, Absolute 11/03/2021 0.06  0.00 - 0.20 10*3/mm3 Final   ]       .

## 2021-11-11 NOTE — PROGRESS NOTES
QUICK REFERENCE INFORMATION:  The ABCs of the Annual Wellness Visit    Subsequent Medicare Wellness Visit    HEALTH RISK ASSESSMENT    1940    Recent Hospitalizations:  No hospitalization(s) within the last year..        Current Medical Providers:  Patient Care Team:  Serg Dale MD as PCP - General (Family Medicine)  Ashu Escamilla (Ophthalmology)  Birdie Spencer MD (Pulmonary Disease)        Smoking Status:  Social History     Tobacco Use   Smoking Status Never Smoker   Smokeless Tobacco Never Used       Alcohol Consumption:  Social History     Substance and Sexual Activity   Alcohol Use No       Depression Screen:   PHQ-2/PHQ-9 Depression Screening 11/12/2021   Little interest or pleasure in doing things 0   Feeling down, depressed, or hopeless 0   Trouble falling or staying asleep, or sleeping too much -   Feeling tired or having little energy -   Poor appetite or overeating -   Feeling bad about yourself - or that you are a failure or have let yourself or your family down -   Trouble concentrating on things, such as reading the newspaper or watching television -   Moving or speaking so slowly that other people could have noticed. Or the opposite - being so fidgety or restless that you have been moving around a lot more than usual -   Thoughts that you would be better off dead, or of hurting yourself in some way -   Total Score 0       Health Habits and Functional and Cognitive Screening:  Functional & Cognitive Status 11/12/2021   Do you have difficulty preparing food and eating? No   Do you have difficulty bathing yourself, getting dressed or grooming yourself? No   Do you have difficulty using the toilet? No   Do you have difficulty moving around from place to place? No   Do you have trouble with steps or getting out of a bed or a chair? No   Current Diet Diabetic Diet   Dental Exam Up to date   Eye Exam Up to date   Exercise (times per week) 7 times per week   Current Exercises Include  Walking;Yard Work   Current Exercise Activities Include -   Do you need help using the phone?  No   Are you deaf or do you have serious difficulty hearing?  No   Do you need help with transportation? Yes   Do you need help shopping? Yes   Do you need help preparing meals?  No   Do you need help with housework?  No   Do you need help with laundry? No   Do you need help taking your medications? No   Do you need help managing money? No   Do you ever drive or ride in a car without wearing a seat belt? No   Have you felt unusual stress, anger or loneliness in the last month? No   Who do you live with? Spouse   If you need help, do you have trouble finding someone available to you? No   Have you been bothered in the last four weeks by sexual problems? No   Do you have difficulty concentrating, remembering or making decisions? (No Data)           Does the patient have evidence of cognitive impairment? No    Asiprin use counseling: Taking ASA appropriately as indicated      Recent Lab Results:    Visual Acuity:  No exam data present    Age-appropriate Screening Schedule:  Refer to the list below for future screening recommendations based on patient's age, sex and/or medical conditions. Orders for these recommended tests are listed in the plan section. The patient has been provided with a written plan.    Health Maintenance   Topic Date Due   • TDAP/TD VACCINES (1 - Tdap) Never done   • ZOSTER VACCINE (2 of 3) 12/04/2015   • URINE MICROALBUMIN  11/03/2021   • HEMOGLOBIN A1C  05/03/2022   • PROSTATE CANCER SCREENING  05/04/2022   • DIABETIC EYE EXAM  10/21/2022   • LIPID PANEL  11/03/2022   • INFLUENZA VACCINE  Completed        Subjective   History of Present Illness    Darwin Fraga is a 81 y.o. male who presents for an Annual Wellness Visit.    The following portions of the patient's history were reviewed and updated as appropriate: allergies, current medications, past family history, past medical history, past social  history, past surgical history and problem list.    Outpatient Medications Prior to Visit   Medication Sig Dispense Refill   • amLODIPine (NORVASC) 10 MG tablet TAKE 1 TABLET DAILY 90 tablet 3   • aspirin 81 MG EC tablet Take 81 mg by mouth Daily.     • Calcium Polycarbophil (FIBERCON PO) Take 2 tablets by mouth Daily.     • clobetasol (OLUX) 0.05 % topical foam   5   • Docusate Sodium (STOOL SOFTENER) 100 MG capsule Take 100 mg by mouth Daily.     • fluticasone (FLONASE) 50 MCG/ACT nasal spray 2 sprays into the nostril(s) as directed by provider Daily. Administer 2 sprays in each nostril for each dose. 1 bottle 0   • folic acid (FOLVITE) 1 MG tablet TAKE 1 TABLET DAILY 90 tablet 3   • gabapentin (NEURONTIN) 100 MG capsule Take 1 capsule by mouth 3 (Three) Times a Day. 90 capsule 1   • glimepiride (AMARYL) 4 MG tablet TAKE 1 TABLET TWICE A  tablet 3   • glucose blood test strip Use as instructed 100 each 3   • glucose monitor monitoring kit 1 each Daily. 1 each 0   • Janumet  MG per tablet TAKE 1 TABLET TWICE A  tablet 3   • labetalol (NORMODYNE) 200 MG tablet TAKE 1 TABLET TWICE A  tablet 3   • Lancets misc Use to test blood sugar once daily 100 each 3   • levothyroxine (SYNTHROID, LEVOTHROID) 25 MCG tablet TAKE 1 TABLET DAILY 90 tablet 3   • Multiple Vitamins-Minerals (PRESERVISION AREDS 2+MULTI VIT) capsule Take 1 capsule by mouth 2 (Two) Times a Day.     • naproxen (NAPROSYN) 500 MG tablet TAKE 1 TABLET DAILY AS NEEDED FOR MILD PAIN OR MODERATE PAIN 90 tablet 3   • pantoprazole (PROTONIX) 40 MG EC tablet TAKE 1 TABLET DAILY 90 tablet 3   • pioglitazone (ACTOS) 15 MG tablet Take 1 tablet by mouth Daily. 90 tablet 3   • pravastatin (PRAVACHOL) 40 MG tablet TAKE 1 TABLET EVERY NIGHT 90 tablet 3   • Probiotic Product (PROBIOTIC DAILY PO) Take 1 capsule by mouth Daily.     • tamsulosin (FLOMAX) 0.4 MG capsule 24 hr capsule TAKE 1 CAPSULE DAILY 90 capsule 0   • ONE TOUCH LANCETS misc 1 each  "Daily. 200 each 2     No facility-administered medications prior to visit.       Patient Active Problem List   Diagnosis   • Type 2 diabetes mellitus with complication, without long-term current use of insulin ([facility])   • Primary osteoarthritis involving multiple joints   • Mixed hyperlipidemia   • Essential hypertension   • Benign non-nodular prostatic hyperplasia with lower urinary tract symptoms   • Acquired hypothyroidism   • Gastroesophageal reflux disease without esophagitis   • Coronary artery disease involving native coronary artery of native heart without angina pectoris   • Chronic renal impairment, stage 3a ([facility])   • Macular degeneration of both eyes   • No diabetic retinopathy in both eyes   • Erectile dysfunction associated with type 2 diabetes mellitus ([facility])   • Chest wall deformity   • Hypertensive heart disease with heart failure ([facility])   • Atrioventricular block, complete ([facility])       Advance Care Planning:  ACP Discussion Status: ACP discussion was held with the patient during this visit. Patient has an advance directive (not in EMR), copy requested.      Identification of Risk Factors:  Risk factors include: weight  and cardiovascular risk.    Review of Systems    Compared to one year ago, the patient feels his physical health is the same.  Compared to one year ago, the patient feels his mental health is the same.    Objective     Physical Exam    Vitals:    11/12/21 1032   BP: 122/68   BP Location: Left arm   Patient Position: Sitting   Cuff Size: Large Adult   Pulse: 75   Temp: 96.4 °F (35.8 °C)   TempSrc: Tympanic   SpO2: 97%   Weight: 95.7 kg (211 lb)   Height: 182.9 cm (72\")   PainSc: 0-No pain       Patient's Body mass index is 28.62 kg/m². BMI is above normal parameters. Recommendations include: educational material.      Assessment/Plan   Patient Self-Management and Personalized Health Advice  The patient has been provided with information about: diet, exercise and weight management and " preventive services including:   · Exercise counseling provided, Fall Risk assessment done, Nutrition counseling provided, Prostate cancer screening discussed.    Visit Diagnoses:    ICD-10-CM ICD-9-CM   1. Medicare annual wellness visit, subsequent  Z00.00 V70.0   2. Type 2 diabetes mellitus with complication, without long-term current use of insulin (HCC)  E11.8 250.90   3. Essential hypertension  I10 401.9   4. Mixed hyperlipidemia  E78.2 272.2   5. Acquired hypothyroidism  E03.9 244.9   6. Gastroesophageal reflux disease without esophagitis  K21.9 530.81   7. Benign non-nodular prostatic hyperplasia with lower urinary tract symptoms  N40.1 600.91   8. Chronic renal impairment, stage 3a ([facility])  N18.31 585.3   9. Hypertensive heart disease with heart failure ([facility])  I11.0 402.91     428.9   10. Atrioventricular block, complete ([facility])  I44.2 426.0   11. Screening for prostate cancer  Z12.5 V76.44       Orders Placed This Encounter   Procedures   • Comprehensive Metabolic Panel     Standing Status:   Future     Standing Expiration Date:   11/11/2022     Order Specific Question:   Release to patient     Answer:   Immediate   • T4, Free     Standing Status:   Future     Standing Expiration Date:   11/11/2022     Order Specific Question:   Release to patient     Answer:   Immediate   • TSH     Standing Status:   Future     Standing Expiration Date:   11/11/2022     Order Specific Question:   Release to patient     Answer:   Immediate   • Urinalysis With Culture If Indicated -     Standing Status:   Future     Standing Expiration Date:   11/11/2022     Order Specific Question:   Release to patient     Answer:   Immediate   • Hemoglobin A1c     Standing Status:   Future     Standing Expiration Date:   11/11/2022     Order Specific Question:   Release to patient     Answer:   Immediate   • Lipid Panel     Standing Status:   Future     Standing Expiration Date:   11/11/2022   • Microalbumin / Creatinine Urine Ratio - Urine,  Clean Catch     Standing Status:   Future     Standing Expiration Date:   11/11/2022     Order Specific Question:   Release to patient     Answer:   Immediate   • PSA Screen     Standing Status:   Future     Standing Expiration Date:   11/11/2022     Order Specific Question:   Release to patient     Answer:   Immediate   • CBC & Differential     Standing Status:   Future     Standing Expiration Date:   11/11/2022     Order Specific Question:   Manual Differential     Answer:   No       Outpatient Encounter Medications as of 11/12/2021   Medication Sig Dispense Refill   • amLODIPine (NORVASC) 10 MG tablet TAKE 1 TABLET DAILY 90 tablet 3   • aspirin 81 MG EC tablet Take 81 mg by mouth Daily.     • Calcium Polycarbophil (FIBERCON PO) Take 2 tablets by mouth Daily.     • clobetasol (OLUX) 0.05 % topical foam   5   • Docusate Sodium (STOOL SOFTENER) 100 MG capsule Take 100 mg by mouth Daily.     • fluticasone (FLONASE) 50 MCG/ACT nasal spray 2 sprays into the nostril(s) as directed by provider Daily. Administer 2 sprays in each nostril for each dose. 1 bottle 0   • folic acid (FOLVITE) 1 MG tablet TAKE 1 TABLET DAILY 90 tablet 3   • gabapentin (NEURONTIN) 100 MG capsule Take 1 capsule by mouth 3 (Three) Times a Day. 90 capsule 1   • glimepiride (AMARYL) 4 MG tablet TAKE 1 TABLET TWICE A  tablet 3   • glucose blood test strip Use as instructed 100 each 3   • glucose monitor monitoring kit 1 each Daily. 1 each 0   • Janumet  MG per tablet TAKE 1 TABLET TWICE A  tablet 3   • labetalol (NORMODYNE) 200 MG tablet TAKE 1 TABLET TWICE A  tablet 3   • Lancets misc Use to test blood sugar once daily 100 each 3   • levothyroxine (SYNTHROID, LEVOTHROID) 25 MCG tablet TAKE 1 TABLET DAILY 90 tablet 3   • Multiple Vitamins-Minerals (PRESERVISION AREDS 2+MULTI VIT) capsule Take 1 capsule by mouth 2 (Two) Times a Day.     • naproxen (NAPROSYN) 500 MG tablet TAKE 1 TABLET DAILY AS NEEDED FOR MILD PAIN OR  MODERATE PAIN 90 tablet 3   • pantoprazole (PROTONIX) 40 MG EC tablet TAKE 1 TABLET DAILY 90 tablet 3   • pioglitazone (ACTOS) 15 MG tablet Take 1 tablet by mouth Daily. 90 tablet 3   • pravastatin (PRAVACHOL) 40 MG tablet TAKE 1 TABLET EVERY NIGHT 90 tablet 3   • Probiotic Product (PROBIOTIC DAILY PO) Take 1 capsule by mouth Daily.     • tamsulosin (FLOMAX) 0.4 MG capsule 24 hr capsule TAKE 1 CAPSULE DAILY 90 capsule 0   • [DISCONTINUED] ONE TOUCH LANCETS misc 1 each Daily. 200 each 2     No facility-administered encounter medications on file as of 11/12/2021.       Reviewed use of high risk medication in the elderly: yes  Reviewed for potential of harmful drug interactions in the elderly: yes    Follow Up:  Return in about 6 months (around 5/12/2022) for Next scheduled follow up, Or sooner as needed With Labs prior to appointment.     An After Visit Summary and PPPS with all of these plans were given to the patient.         .

## 2021-11-12 ENCOUNTER — OFFICE VISIT (OUTPATIENT)
Dept: FAMILY MEDICINE CLINIC | Facility: CLINIC | Age: 81
End: 2021-11-12

## 2021-11-12 VITALS
TEMPERATURE: 96.4 F | DIASTOLIC BLOOD PRESSURE: 68 MMHG | OXYGEN SATURATION: 97 % | WEIGHT: 211 LBS | HEIGHT: 72 IN | SYSTOLIC BLOOD PRESSURE: 122 MMHG | BODY MASS INDEX: 28.58 KG/M2 | HEART RATE: 75 BPM

## 2021-11-12 DIAGNOSIS — E11.8 TYPE 2 DIABETES MELLITUS WITH COMPLICATION, WITHOUT LONG-TERM CURRENT USE OF INSULIN (HCC): Chronic | ICD-10-CM

## 2021-11-12 DIAGNOSIS — I11.0 HYPERTENSIVE HEART DISEASE WITH HEART FAILURE (HCC): Chronic | ICD-10-CM

## 2021-11-12 DIAGNOSIS — E78.2 MIXED HYPERLIPIDEMIA: Chronic | ICD-10-CM

## 2021-11-12 DIAGNOSIS — E03.9 ACQUIRED HYPOTHYROIDISM: Chronic | ICD-10-CM

## 2021-11-12 DIAGNOSIS — I10 ESSENTIAL HYPERTENSION: Chronic | ICD-10-CM

## 2021-11-12 DIAGNOSIS — N40.1 BENIGN NON-NODULAR PROSTATIC HYPERPLASIA WITH LOWER URINARY TRACT SYMPTOMS: Chronic | ICD-10-CM

## 2021-11-12 DIAGNOSIS — Z00.00 MEDICARE ANNUAL WELLNESS VISIT, SUBSEQUENT: Primary | ICD-10-CM

## 2021-11-12 DIAGNOSIS — N18.31 CHRONIC RENAL IMPAIRMENT, STAGE 3A (HCC): Chronic | ICD-10-CM

## 2021-11-12 DIAGNOSIS — I44.2 ATRIOVENTRICULAR BLOCK, COMPLETE (HCC): Chronic | ICD-10-CM

## 2021-11-12 DIAGNOSIS — Z12.5 SCREENING FOR PROSTATE CANCER: ICD-10-CM

## 2021-11-12 DIAGNOSIS — K21.9 GASTROESOPHAGEAL REFLUX DISEASE WITHOUT ESOPHAGITIS: Chronic | ICD-10-CM

## 2021-11-12 PROCEDURE — G0439 PPPS, SUBSEQ VISIT: HCPCS | Performed by: INTERNAL MEDICINE

## 2021-11-12 PROCEDURE — 1159F MED LIST DOCD IN RCRD: CPT | Performed by: INTERNAL MEDICINE

## 2021-11-12 PROCEDURE — 99214 OFFICE O/P EST MOD 30 MIN: CPT | Performed by: INTERNAL MEDICINE

## 2021-11-12 PROCEDURE — 1126F AMNT PAIN NOTED NONE PRSNT: CPT | Performed by: INTERNAL MEDICINE

## 2021-12-03 DIAGNOSIS — I25.10 CORONARY ARTERY DISEASE INVOLVING NATIVE CORONARY ARTERY OF NATIVE HEART WITHOUT ANGINA PECTORIS: Chronic | ICD-10-CM

## 2021-12-03 DIAGNOSIS — E11.8 TYPE 2 DIABETES MELLITUS WITH COMPLICATION, WITHOUT LONG-TERM CURRENT USE OF INSULIN (HCC): Chronic | ICD-10-CM

## 2021-12-03 RX ORDER — FOLIC ACID 1 MG/1
TABLET ORAL
Qty: 90 TABLET | Refills: 3 | Status: SHIPPED | OUTPATIENT
Start: 2021-12-03

## 2021-12-03 RX ORDER — SITAGLIPTIN AND METFORMIN HYDROCHLORIDE 500; 50 MG/1; MG/1
TABLET, FILM COATED ORAL
Qty: 180 TABLET | Refills: 3 | Status: SHIPPED | OUTPATIENT
Start: 2021-12-03 | End: 2022-06-10

## 2021-12-03 RX ORDER — GLIMEPIRIDE 4 MG/1
TABLET ORAL
Qty: 180 TABLET | Refills: 3 | Status: SHIPPED | OUTPATIENT
Start: 2021-12-03 | End: 2022-06-10

## 2021-12-04 ENCOUNTER — HOSPITAL ENCOUNTER (EMERGENCY)
Facility: HOSPITAL | Age: 81
Discharge: LEFT BEFORE DISCHARGE | End: 2021-12-04
Attending: EMERGENCY MEDICINE | Admitting: EMERGENCY MEDICINE

## 2021-12-04 ENCOUNTER — APPOINTMENT (OUTPATIENT)
Dept: GENERAL RADIOLOGY | Facility: HOSPITAL | Age: 81
End: 2021-12-04

## 2021-12-04 VITALS
HEIGHT: 72 IN | TEMPERATURE: 98.1 F | HEART RATE: 74 BPM | WEIGHT: 200 LBS | SYSTOLIC BLOOD PRESSURE: 160 MMHG | OXYGEN SATURATION: 97 % | DIASTOLIC BLOOD PRESSURE: 80 MMHG | RESPIRATION RATE: 18 BRPM | BODY MASS INDEX: 27.09 KG/M2

## 2021-12-04 DIAGNOSIS — R07.9 CHEST PAIN, UNSPECIFIED TYPE: Primary | ICD-10-CM

## 2021-12-04 DIAGNOSIS — I50.9 NEW ONSET OF CONGESTIVE HEART FAILURE (HCC): ICD-10-CM

## 2021-12-04 LAB
ALBUMIN SERPL-MCNC: 4.1 G/DL (ref 3.5–5.2)
ALBUMIN/GLOB SERPL: 1.6 G/DL
ALP SERPL-CCNC: 84 U/L (ref 39–117)
ALT SERPL W P-5'-P-CCNC: 13 U/L (ref 1–41)
ANION GAP SERPL CALCULATED.3IONS-SCNC: 10 MMOL/L (ref 5–15)
AST SERPL-CCNC: 16 U/L (ref 1–40)
BASOPHILS # BLD AUTO: 0.05 10*3/MM3 (ref 0–0.2)
BASOPHILS NFR BLD AUTO: 0.7 % (ref 0–1.5)
BILIRUB SERPL-MCNC: 0.5 MG/DL (ref 0–1.2)
BUN SERPL-MCNC: 20 MG/DL (ref 8–23)
BUN/CREAT SERPL: 14.6 (ref 7–25)
CALCIUM SPEC-SCNC: 8.9 MG/DL (ref 8.6–10.5)
CHLORIDE SERPL-SCNC: 102 MMOL/L (ref 98–107)
CO2 SERPL-SCNC: 24 MMOL/L (ref 22–29)
CREAT SERPL-MCNC: 1.37 MG/DL (ref 0.76–1.27)
DEPRECATED RDW RBC AUTO: 43.8 FL (ref 37–54)
EOSINOPHIL # BLD AUTO: 0.29 10*3/MM3 (ref 0–0.4)
EOSINOPHIL NFR BLD AUTO: 3.8 % (ref 0.3–6.2)
ERYTHROCYTE [DISTWIDTH] IN BLOOD BY AUTOMATED COUNT: 14.3 % (ref 12.3–15.4)
GFR SERPL CREATININE-BSD FRML MDRD: 50 ML/MIN/1.73
GLOBULIN UR ELPH-MCNC: 2.5 GM/DL
GLUCOSE SERPL-MCNC: 165 MG/DL (ref 65–99)
HCT VFR BLD AUTO: 42.2 % (ref 37.5–51)
HGB BLD-MCNC: 13.7 G/DL (ref 13–17.7)
HOLD SPECIMEN: NORMAL
HOLD SPECIMEN: NORMAL
IMM GRANULOCYTES # BLD AUTO: 0.03 10*3/MM3 (ref 0–0.05)
IMM GRANULOCYTES NFR BLD AUTO: 0.4 % (ref 0–0.5)
LYMPHOCYTES # BLD AUTO: 2.05 10*3/MM3 (ref 0.7–3.1)
LYMPHOCYTES NFR BLD AUTO: 26.9 % (ref 19.6–45.3)
MCH RBC QN AUTO: 27.5 PG (ref 26.6–33)
MCHC RBC AUTO-ENTMCNC: 32.5 G/DL (ref 31.5–35.7)
MCV RBC AUTO: 84.6 FL (ref 79–97)
MONOCYTES # BLD AUTO: 1.04 10*3/MM3 (ref 0.1–0.9)
MONOCYTES NFR BLD AUTO: 13.6 % (ref 5–12)
NEUTROPHILS NFR BLD AUTO: 4.17 10*3/MM3 (ref 1.7–7)
NEUTROPHILS NFR BLD AUTO: 54.6 % (ref 42.7–76)
NRBC BLD AUTO-RTO: 0 /100 WBC (ref 0–0.2)
NT-PROBNP SERPL-MCNC: 1273 PG/ML (ref 0–1800)
PLATELET # BLD AUTO: 186 10*3/MM3 (ref 140–450)
PMV BLD AUTO: 11.4 FL (ref 6–12)
POTASSIUM SERPL-SCNC: 3.7 MMOL/L (ref 3.5–5.2)
PROT SERPL-MCNC: 6.6 G/DL (ref 6–8.5)
RBC # BLD AUTO: 4.99 10*6/MM3 (ref 4.14–5.8)
SODIUM SERPL-SCNC: 136 MMOL/L (ref 136–145)
TROPONIN T SERPL-MCNC: 0.1 NG/ML (ref 0–0.03)
TROPONIN T SERPL-MCNC: <0.01 NG/ML (ref 0–0.03)
WBC NRBC COR # BLD: 7.63 10*3/MM3 (ref 3.4–10.8)

## 2021-12-04 PROCEDURE — 84484 ASSAY OF TROPONIN QUANT: CPT | Performed by: EMERGENCY MEDICINE

## 2021-12-04 PROCEDURE — 36415 COLL VENOUS BLD VENIPUNCTURE: CPT

## 2021-12-04 PROCEDURE — 80053 COMPREHEN METABOLIC PANEL: CPT | Performed by: EMERGENCY MEDICINE

## 2021-12-04 PROCEDURE — 83880 ASSAY OF NATRIURETIC PEPTIDE: CPT | Performed by: EMERGENCY MEDICINE

## 2021-12-04 PROCEDURE — 93010 ELECTROCARDIOGRAM REPORT: CPT | Performed by: INTERNAL MEDICINE

## 2021-12-04 PROCEDURE — 93005 ELECTROCARDIOGRAM TRACING: CPT | Performed by: EMERGENCY MEDICINE

## 2021-12-04 PROCEDURE — 99283 EMERGENCY DEPT VISIT LOW MDM: CPT

## 2021-12-04 PROCEDURE — 85025 COMPLETE CBC W/AUTO DIFF WBC: CPT | Performed by: EMERGENCY MEDICINE

## 2021-12-04 PROCEDURE — 93005 ELECTROCARDIOGRAM TRACING: CPT

## 2021-12-04 PROCEDURE — 99284 EMERGENCY DEPT VISIT MOD MDM: CPT

## 2021-12-04 PROCEDURE — 71045 X-RAY EXAM CHEST 1 VIEW: CPT

## 2021-12-04 RX ORDER — SODIUM CHLORIDE 0.9 % (FLUSH) 0.9 %
10 SYRINGE (ML) INJECTION AS NEEDED
Status: DISCONTINUED | OUTPATIENT
Start: 2021-12-04 | End: 2021-12-04 | Stop reason: HOSPADM

## 2021-12-04 NOTE — ED PROVIDER NOTES
"Subjective   81-year-old male presents the emergency department via EMS from home with reported 7 out of 10 chest pain that awoke him from sleep.  EMS gave nitroglycerin which has completely resolved his pain.  They also gave aspirin.  He reports history of coronary artery disease as to \"minor heart attacks\".  Last one being 3 years ago.  Reports he did not receive stenting because he was told that the vessels were too small for \"too high up\".  Reports he follows with cardiology at [facility].  Reports he was told to not have another stress test because of something that occurred with his last one.  Denies any associated shortness of breath or swelling.  History of hypertension, hyperlipidemia and diabetes.    Family history, surgical history, social history, current medications and allergies are reviewed with the patient and triage documentation and vitals are reviewed.      History provided by:  Patient, spouse and medical records   used: No        Review of Systems   Constitutional: Negative for chills, diaphoresis, fatigue and fever.   HENT: Negative for congestion and sore throat.    Eyes: Negative for photophobia and visual disturbance.   Respiratory: Negative for cough, shortness of breath and wheezing.    Cardiovascular: Positive for chest pain. Negative for palpitations and leg swelling.   Gastrointestinal: Negative for abdominal pain, diarrhea, nausea and vomiting.   Endocrine: Negative for polydipsia, polyphagia and polyuria.   Genitourinary: Negative for dysuria, frequency and urgency.   Musculoskeletal: Negative for arthralgias, back pain, myalgias and neck pain.   Skin: Negative for rash and wound.   Allergic/Immunologic: Negative.    Neurological: Negative.    Hematological: Negative.    Psychiatric/Behavioral: Negative.        Past Medical History:   Diagnosis Date   • Acquired hypothyroidism    • Acute urinary tract infection    • Anemia    • Benign non-nodular prostatic " hyperplasia with lower urinary tract symptoms    • Diverticular disease of colon    • Essential hypertension    • Gastrointestinal hemorrhage    • History of myocardial infarction    • Hyperlipidemia    • Impacted cerumen    • Low back pain     rad to left hip   • Macular degeneration    • Macular degeneration 09/01/2017    Dr. Tortter in [place], [place]   • Otalgia    • Primary osteoarthritis involving multiple joints    • Special screening for malignant neoplasms, colon    • Type 2 diabetes mellitus without complication (HCC)    • Uric acid renal calculus    • Urinary incontinence        Allergies   Allergen Reactions   • Contrast Dye Hives and GI Intolerance   • Iodine Hives     Hives and vomiting with contrast   • Tuberculin Other (See Comments)     Redness and swelling       Past Surgical History:   Procedure Laterality Date   • CARDIAC SURGERY      Pace maker insertion 8-32-19 Dr. Charles   • COLONOSCOPY     • ENDOSCOPY AND COLONOSCOPY  02/2015    GI bleed   • EYE SURGERY      Lasik   • OTHER SURGICAL HISTORY  10/06/2015    Remove Impacted Cerumen 76490 (1)      • PACEMAKER IMPLANTATION  2019    [facility]    • TONSILLECTOMY  10/06/2015       Family History   Problem Relation Age of Onset   • Cancer Mother         liver   • Uterine cancer Mother    • Cancer Father         penis   • COPD Father    • Emphysema Father    • Diabetes Maternal Grandfather        Social History     Socioeconomic History   • Marital status:    Tobacco Use   • Smoking status: Never Smoker   • Smokeless tobacco: Never Used   Substance and Sexual Activity   • Alcohol use: No   • Drug use: No   • Sexual activity: Defer           Objective   Physical Exam  Vitals and nursing note reviewed.   Constitutional:       General: He is not in acute distress.     Appearance: He is well-developed and overweight. He is not ill-appearing, toxic-appearing or diaphoretic.   HENT:      Head: Normocephalic.   Eyes:      Pupils: Pupils are  equal, round, and reactive to light.   Neck:      Vascular: No JVD.   Cardiovascular:      Rate and Rhythm: Normal rate and regular rhythm.  No extrasystoles are present.     Pulses:           Radial pulses are 2+ on the right side and 2+ on the left side.        Dorsalis pedis pulses are 2+ on the right side and 2+ on the left side.      Heart sounds: Normal heart sounds. No murmur heard.      Pulmonary:      Effort: Pulmonary effort is normal. No tachypnea, accessory muscle usage or respiratory distress.      Breath sounds: No decreased breath sounds, wheezing, rhonchi or rales.   Abdominal:      General: Bowel sounds are normal.      Palpations: Abdomen is soft. There is no hepatomegaly or splenomegaly.      Tenderness: There is no abdominal tenderness.   Musculoskeletal:         General: Normal range of motion.      Cervical back: Normal range of motion and neck supple.      Right lower leg: No tenderness. No edema.      Left lower leg: No tenderness. No edema.   Skin:     General: Skin is warm and dry.      Capillary Refill: Capillary refill takes less than 2 seconds.   Neurological:      General: No focal deficit present.      Mental Status: He is alert and oriented to person, place, and time.   Psychiatric:         Mood and Affect: Mood normal.         Behavior: Behavior normal.         Procedures  none         ED Course  ED Course as of 12/04/21 0804   Sat Dec 04, 2021   0742 High risk chest pain which is now resolved to 0 out of 10.  Patient requested transfer to [facility] with his doctors residing in [place].  Patient will be transferred for further care at his request. [PC]   0803 Patient refusing EMS transport.  Patient 0 out of 10.  The risks and of this were discussed with the patient.  Patient also evidently does not want to pay the EMS bill going by his personal preference to [facility].  Patient is with family who will drive him to [facility] purportedly.  Patient will sign out  AGAINST MEDICAL ADVICE.  Informed [place] [facility] [place] was prepared for the patient. [PC]      ED Course User Index  [PC] Cesar Matias MD      Labs Reviewed   COMPREHENSIVE METABOLIC PANEL - Abnormal; Notable for the following components:       Result Value    Glucose 165 (*)     Creatinine 1.37 (*)     eGFR Non  Amer 50 (*)     All other components within normal limits    Narrative:     GFR Normal >60  Chronic Kidney Disease <60  Kidney Failure <15     CBC WITH AUTO DIFFERENTIAL - Abnormal; Notable for the following components:    Monocyte % 13.6 (*)     Monocytes, Absolute 1.04 (*)     All other components within normal limits   TROPONIN (IN-HOUSE) - Normal    Narrative:     Troponin T Reference Range:  <= 0.03 ng/mL-   Negative for AMI  >0.03 ng/mL-     Abnormal for myocardial necrosis.  Clinicians would have to utilize clinical acumen, EKG, Troponin and serial changes to determine if it is an Acute Myocardial Infarction or myocardial injury due to an underlying chronic condition.       Results may be falsely decreased if patient taking Biotin.     BNP (IN-HOUSE) - Normal    Narrative:     Among patients with dyspnea, NT-proBNP is highly sensitive for the detection of acute congestive heart failure. In addition NT-proBNP of <300 pg/ml effectively rules out acute congestive heart failure with 99% negative predictive value.    Results may be falsely decreased if patient taking Biotin.     RAINBOW DRAW    Narrative:     The following orders were created for panel order [place] Draw.  Procedure                               Abnormality         Status                     ---------                               -----------         ------                     Green Top (Gel)[[id number]]                                  Final result               Lavender Top[[id number]]                                                                Gold Top - [facility][[id number]]                                    Final result               Light Blue Top[[id number]]                                                                Please view results for these tests on the individual orders.   TROPONIN (IN-HOUSE)   CBC AND DIFFERENTIAL    Narrative:     The following orders were created for panel order CBC & Differential.  Procedure                               Abnormality         Status                     ---------                               -----------         ------                     CBC Auto Differential[[id number]]        Abnormal            Final result                 Please view results for these tests on the individual orders.   GREEN TOP   GOLD TOP - SST   LAVENDER TOP     XR Chest 1 View    Result Date: 12/4/2021  Narrative: EXAM: Single portable XR view of the chest INDICATION: Chest Pain triage protocol Chest Pain Triage Protocol COMPARISON: Radiograph from 6/23/2020; CT from 4/27/2018 FINDINGS: Left-sided cardiac device is stable in position. The cardiomediastinal silhouette is within normal limits. Vascular congestion with hazy right greater than left bibasilar opacities. No large pleural effusion or visible pneumothorax.     Impression: Vascular congestion with bibasilar opacities possibly representing fluid overload/mild pulmonary edema. Infectious process is not excluded. Electronically signed by:  Fidencio Peoples MD  12/4/2021 6:43 AM CST Workstation: [id number]    EKG December 4, 2021 at 0 542 reveals normal sinus rhythm that appears paced as patient does have a pacemaker in place.  No obvious acute ischemia.  .  No scgarbossa criteria.        HEART Score (for prediction of 6-week risk of major adverse cardiac event) reviewed and/or performed as part of the patient evaluation and treatment planning process.  The result associated with this review/performance is: 6     HEART Score for Major Cardiac Events from [employer]  on 12/4/2021  ** All calculations should be rechecked by clinician prior  to use **    RESULT SUMMARY:  6 points  Moderate Score (4-6 points)    Risk of MACE of 12-16.6%.      INPUTS:  History --> 2 = Highly suspicious  EKG --> 0 = Normal  Age --> 2 = ?65  Risk factors --> 2 = ?3 risk factors or history of atherosclerotic disease  Initial troponin --> 0 = ?normal limit      MDM  Number of Diagnoses or Management Options     Amount and/or Complexity of Data Reviewed  Clinical lab tests: reviewed  Tests in the radiology section of CPT®: reviewed  Discuss the patient with other providers: yes    Patient Progress  Patient progress: stable    0700 Patient pending transfer at the end of my shift.  Signed out to Dr. Matias.  Please see his documentation for final disposition.    Final diagnoses:   Chest pain, unspecified type   New onset of congestive heart failure (HCC)       ED Disposition  ED Disposition     ED Disposition Condition Comment    AMA            No follow-up provider specified.       Medication List      No changes were made to your prescriptions during this visit.          Cesar Matias MD  12/04/21 0743       Cesar Matias MD  12/04/21 0804

## 2021-12-04 NOTE — ED NOTES
Spoke with Mago from [facility] at this time      Kendra Ortiz RN  12/04/21 0793

## 2021-12-04 NOTE — ED NOTES
Pt reporting no chest pain at this time. Pt informed to go straight to [facility] ER, pt given printed chart to take with him.      Kendra Ortiz RN  12/04/21 [id number]

## 2021-12-04 NOTE — ED NOTES
Pt requesting to leave AMA. Pt has established cardiology doctor at [facility].  MD made aware.      Kendra Ortiz, MARICRUZ  12/04/21 0824       Kendra Ortiz RN  12/04/21 0828

## 2021-12-10 ENCOUNTER — OFFICE VISIT (OUTPATIENT)
Dept: FAMILY MEDICINE CLINIC | Facility: CLINIC | Age: 81
End: 2021-12-10

## 2021-12-10 VITALS
BODY MASS INDEX: 27.36 KG/M2 | TEMPERATURE: 97.4 F | HEIGHT: 72 IN | WEIGHT: 202 LBS | SYSTOLIC BLOOD PRESSURE: 136 MMHG | OXYGEN SATURATION: 97 % | DIASTOLIC BLOOD PRESSURE: 70 MMHG | HEART RATE: 86 BPM

## 2021-12-10 DIAGNOSIS — I21.4 NSTEMI (NON-ST ELEVATED MYOCARDIAL INFARCTION) (HCC): ICD-10-CM

## 2021-12-10 DIAGNOSIS — I25.10 CORONARY ARTERY DISEASE INVOLVING NATIVE CORONARY ARTERY OF NATIVE HEART WITHOUT ANGINA PECTORIS: Chronic | ICD-10-CM

## 2021-12-10 DIAGNOSIS — Z09 HOSPITAL DISCHARGE FOLLOW-UP: Primary | ICD-10-CM

## 2021-12-10 DIAGNOSIS — I24.9 ACS (ACUTE CORONARY SYNDROME) (HCC): ICD-10-CM

## 2021-12-10 PROCEDURE — 1111F DSCHRG MED/CURRENT MED MERGE: CPT | Performed by: INTERNAL MEDICINE

## 2021-12-10 PROCEDURE — 99214 OFFICE O/P EST MOD 30 MIN: CPT | Performed by: INTERNAL MEDICINE

## 2021-12-10 RX ORDER — METOPROLOL SUCCINATE 50 MG/1
50 TABLET, EXTENDED RELEASE ORAL DAILY
Qty: 90 TABLET | Refills: 1 | Status: ON HOLD | OUTPATIENT
Start: 2021-12-10 | End: 2022-05-03

## 2021-12-10 RX ORDER — NITROGLYCERIN 0.3 MG/1
0.3 TABLET SUBLINGUAL
COMMUNITY
Start: 2021-12-07 | End: 2021-12-10 | Stop reason: SDUPTHER

## 2021-12-10 RX ORDER — NITROGLYCERIN 0.3 MG/1
0.3 TABLET SUBLINGUAL
Qty: 20 TABLET | Refills: 3 | Status: SHIPPED | OUTPATIENT
Start: 2021-12-10

## 2021-12-10 RX ORDER — METOPROLOL SUCCINATE 50 MG/1
50 TABLET, EXTENDED RELEASE ORAL DAILY
COMMUNITY
Start: 2021-12-08 | End: 2021-12-10 | Stop reason: SDUPTHER

## 2021-12-10 NOTE — PROGRESS NOTES
Chief Complaint   Patient presents with   • Hospital Follow Up Visit   • ACS (acute coronary syndrome)   • Coronary artery disease involving native coronary artery of    • NSTEMI     Subjective   Darwin Fraga is a 81 y.o. male who presents to the office for a hospital follow-up.  He was admitted at [facility] in [place] on 12/4/2021 and discharged on 12/7/2021.  He presented to the ER at [facility] complaining of chest pain which was onset early on the morning of admission.  He has coronary artery disease with a prior MI.  He reports that this pain felt much the same, and he was certain that he was having another heart attack.  He initially presented to the ER in [place], but was unable to be transferred in a timely manner.  He signed out AMA and had someone drive him to [facility] in [place].  His blood pressure was elevated at 175/100.  His initial troponin was elevated at 251.  He was diagnosed with acute coronary syndrome and NSTEMI.  Cardiology was consulted, and he was started on a heparin drip.  He had a left heart catheterization the next morning, on 12/5.  This showed diffuse left system disease not amenable to PCI.  He also had an unchanged mid RCA lesion with nonflow limiting disease.  His labetalol was discontinued and he was started on Toprol-XL.  He was also started on Entresto.  He had an echocardiogram which showed ejection fraction of 35 to 40%.  There was a large lucency noted anteriorly.  This appeared to be consistent with a fat pad, however thrombus could not be ruled out.  There was also wall motion abnormalities noted.  Cardiothoracic surgery was then consulted and recommended noninterventional treatment.  He was then discharged home in stable and improved condition on 12/7/2021.  He has follow-up appointments with cardiology.    At discharge, new medications were Toprol-XL 50 mg daily, Entresto 24-26 mg twice daily, nitroglycerin 0.3 mg sublingual  "every 5 minutes as needed for chest pain.  Labetalol and naproxen were discontinued.  All of his other medications remained the same.  He has not had any chest pain since discharge.  He has a follow-up appointment with cardiology next week.  He is also scheduled for a ANDREW on Monday for further evaluation of the large lucency which was noted on his previous echocardiogram.      The following portions of the patient's history were reviewed and updated as appropriate: allergies, current medications, past family history, past medical history, past social history, past surgical history and problem list.    Review of Systems   Constitutional: Negative for chills, fatigue and fever.   HENT: Negative for congestion, sneezing, sore throat and trouble swallowing.    Eyes: Negative for visual disturbance.   Respiratory: Negative for cough, chest tightness, shortness of breath and wheezing.    Cardiovascular: Negative for chest pain, palpitations and leg swelling.   Gastrointestinal: Negative for abdominal pain, constipation, diarrhea, nausea and vomiting.   Genitourinary: Negative for dysuria, frequency and urgency.   Musculoskeletal: Negative for neck pain.   Skin: Negative for rash.   Neurological: Negative for dizziness, weakness and headaches.   Psychiatric/Behavioral:        Patient denies any feelings of depression and has not felt down, hopeless or lost interest in any activities.   All other systems reviewed and are negative.  Review of systems has been reviewed and validated on 12/10/2021 and updated with any changes.    Objective   Vitals:    12/10/21 0939   BP: 136/70   BP Location: Left arm   Patient Position: Sitting   Cuff Size: Large Adult   Pulse: 86   Temp: 97.4 °F (36.3 °C)   TempSrc: Tympanic   SpO2: 97%   Weight: 91.6 kg (202 lb)   Height: 182.9 cm (72\")   PainSc: 0-No pain     Body mass index is 27.4 kg/m².  Physical Exam  Vitals and nursing note reviewed.   Constitutional:       General: He is not in " acute distress.     Appearance: He is well-developed.   HENT:      Head: Normocephalic and atraumatic.      Nose: Nose normal.   Eyes:      General: No scleral icterus.     Conjunctiva/sclera: Conjunctivae normal.      Pupils: Pupils are equal, round, and reactive to light.   Cardiovascular:      Rate and Rhythm: Normal rate and regular rhythm.      Heart sounds: Normal heart sounds. No murmur heard.  No friction rub. No gallop.    Pulmonary:      Effort: Pulmonary effort is normal. No respiratory distress.      Breath sounds: Normal breath sounds. No wheezing or rales.   Musculoskeletal:         General: Normal range of motion.      Cervical back: Normal range of motion and neck supple.   Lymphadenopathy:      Cervical: No cervical adenopathy.   Skin:     General: Skin is warm and dry.      Findings: No rash.   Neurological:      Mental Status: He is alert and oriented to person, place, and time.      Cranial Nerves: No cranial nerve deficit.   Psychiatric:         Behavior: Behavior normal.         Thought Content: Thought content normal.         Judgment: Judgment normal.     Physical exam has been reviewed and validated on 12/10/2021 and updated with any changes.    Assessment/Plan   Diagnoses and all orders for this visit:    1. Hospital discharge follow-up (Primary)    2. ACS (acute coronary syndrome) ([facility])    3. NSTEMI (non-ST elevated myocardial infarction) ([facility])    4. Coronary artery disease involving native coronary artery of native heart without angina pectoris  -     metoprolol succinate XL (TOPROL-XL) 50 MG 24 hr tablet; Take 1 tablet by mouth Daily for 31 days.  Dispense: 90 tablet; Refill: 1  -     nitroglycerin (NITROSTAT) 0.3 MG SL tablet; Place 1 tablet under the tongue Every 5 (Five) Minutes As Needed for Chest Pain.  Dispense: 20 tablet; Refill: 3  -     sacubitril-valsartan (ENTRESTO) 24-26 MG tablet; Take 1 tablet by mouth 2 (Two) Times a Day for 31 days.  Dispense: 180 tablet; Refill: 1          I obtained records from his hospitalization.  These are reviewed and discussed above in the history of present illness.  He will follow up with cardiology as scheduled.  His blood pressure is controlled in the office today.  He will continue with Toprol-XL 50 mg daily and Entresto 24-26 mg twice daily which was started during his recent hospitalization.    Current outpatient and discharge medications have been reconciled for the patient.  Reviewed by: Serg Dale MD      PHQ-2/PHQ-9 Depression Screening 12/10/2021   Little interest or pleasure in doing things 0   Feeling down, depressed, or hopeless 0   Trouble falling or staying asleep, or sleeping too much -   Feeling tired or having little energy -   Poor appetite or overeating -   Feeling bad about yourself - or that you are a failure or have let yourself or your family down -   Trouble concentrating on things, such as reading the newspaper or watching television -   Moving or speaking so slowly that other people could have noticed. Or the opposite - being so fidgety or restless that you have been moving around a lot more than usual -   Thoughts that you would be better off dead, or of hurting yourself in some way -   Total Score 0         Admission on 12/04/2021, Discharged on 12/04/2021   Component Date Value Ref Range Status   • Troponin T 12/04/2021 <0.010  0.000 - 0.030 ng/mL Final   • Troponin T 12/04/2021 0.104* 0.000 - 0.030 ng/mL Final    Specimen hemolyzed.  Results may be affected.   • Glucose 12/04/2021 165* 65 - 99 mg/dL Final   • BUN 12/04/2021 20  8 - 23 mg/dL Final   • Creatinine 12/04/2021 1.37* 0.76 - 1.27 mg/dL Final   • Sodium 12/04/2021 136  136 - 145 mmol/L Final   • Potassium 12/04/2021 3.7  3.5 - 5.2 mmol/L Final   • Chloride 12/04/2021 102  98 - 107 mmol/L Final   • CO2 12/04/2021 24.0  22.0 - 29.0 mmol/L Final   • Calcium 12/04/2021 8.9  8.6 - 10.5 mg/dL Final   • Total Protein 12/04/2021 6.6  6.0 - 8.5 g/dL Final   • Albumin  12/04/2021 4.10  3.50 - 5.20 g/dL Final   • ALT (SGPT) 12/04/2021 13  1 - 41 U/L Final   • AST (SGOT) 12/04/2021 16  1 - 40 U/L Final   • Alkaline Phosphatase 12/04/2021 84  39 - 117 U/L Final   • Total Bilirubin 12/04/2021 0.5  0.0 - 1.2 mg/dL Final   • eGFR Non African Amer 12/04/2021 50* >60 mL/min/1.73 Final   • Globulin 12/04/2021 2.5  gm/dL Final   • A/G Ratio 12/04/2021 1.6  g/dL Final   • BUN/Creatinine Ratio 12/04/2021 14.6  7.0 - 25.0 Final   • Anion Gap 12/04/2021 10.0  5.0 - 15.0 mmol/L Final   • proBNP 12/04/2021 1,273.0  0.0-1,800.0 pg/mL Final   • Extra Tube 12/04/2021 Hold for add-ons.   Final    Auto resulted.   • Extra Tube 12/04/2021 Hold for add-ons.   Final    Auto resulted.   • WBC 12/04/2021 7.63  3.40 - 10.80 10*3/mm3 Final   • RBC 12/04/2021 4.99  4.14 - 5.80 10*6/mm3 Final   • Hemoglobin 12/04/2021 13.7  13.0 - 17.7 g/dL Final   • Hematocrit 12/04/2021 42.2  37.5 - 51.0 % Final   • MCV 12/04/2021 84.6  79.0 - 97.0 fL Final   • MCH 12/04/2021 27.5  26.6 - 33.0 pg Final   • MCHC 12/04/2021 32.5  31.5 - 35.7 g/dL Final   • RDW 12/04/2021 14.3  12.3 - 15.4 % Final   • RDW-SD 12/04/2021 43.8  37.0 - 54.0 fl Final   • MPV 12/04/2021 11.4  6.0 - 12.0 fL Final   • Platelets 12/04/2021 186  140 - 450 10*3/mm3 Final   • Neutrophil % 12/04/2021 54.6  42.7 - 76.0 % Final   • Lymphocyte % 12/04/2021 26.9  19.6 - 45.3 % Final   • Monocyte % 12/04/2021 13.6* 5.0 - 12.0 % Final   • Eosinophil % 12/04/2021 3.8  0.3 - 6.2 % Final   • Basophil % 12/04/2021 0.7  0.0 - 1.5 % Final   • Immature Grans % 12/04/2021 0.4  0.0 - 0.5 % Final   • Neutrophils, Absolute 12/04/2021 4.17  1.70 - 7.00 10*3/mm3 Final   • Lymphocytes, Absolute 12/04/2021 2.05  0.70 - 3.10 10*3/mm3 Final   • Monocytes, Absolute 12/04/2021 1.04* 0.10 - 0.90 10*3/mm3 Final   • Eosinophils, Absolute 12/04/2021 0.29  0.00 - 0.40 10*3/mm3 Final   • Basophils, Absolute 12/04/2021 0.05  0.00 - 0.20 10*3/mm3 Final   • Immature Grans, Absolute  12/04/2021 0.03  0.00 - 0.05 10*3/mm3 Final   • nRBC 12/04/2021 0.0  0.0 - 0.2 /100 WBC Final   Lab on 11/03/2021   Component Date Value Ref Range Status   • Glucose 11/03/2021 144* 70 - 99 mg/dL Final   • BUN 11/03/2021 25* 7 - 23 mg/dL Final   • Creatinine 11/03/2021 1.33* 0.70 - 1.30 mg/dL Final   • Sodium 11/03/2021 138  137 - 145 mmol/L Final   • Potassium 11/03/2021 4.0  3.4 - 5.0 mmol/L Final   • Chloride 11/03/2021 105  101 - 112 mmol/L Final   • CO2 11/03/2021 26.0  22.0 - 30.0 mmol/L Final   • Calcium 11/03/2021 9.4  8.4 - 10.2 mg/dL Final   • Total Protein 11/03/2021 7.1  6.3 - 8.6 g/dL Final   • Albumin 11/03/2021 4.10  3.50 - 5.00 g/dL Final   • ALT (SGPT) 11/03/2021 16  <=50 U/L Final   • AST (SGOT) 11/03/2021 22  17 - 59 U/L Final   • Alkaline Phosphatase 11/03/2021 70  38 - 126 U/L Final   • Total Bilirubin 11/03/2021 0.7  0.2 - 1.3 mg/dL Final   • eGFR Non African Amer 11/03/2021 52  42 - 98 mL/min/1.73 Final   • Globulin 11/03/2021 3.0  2.3 - 3.5 gm/dL Final   • A/G Ratio 11/03/2021 1.4  1.1 - 1.8 g/dL Final   • BUN/Creatinine Ratio 11/03/2021 18.8  7.0 - 25.0 Final   • Anion Gap 11/03/2021 7.0  5.0 - 15.0 mmol/L Final   • Free T4 11/03/2021 1.32  0.93 - 1.70 ng/dL Final   • TSH 11/03/2021 3.940  0.270 - 4.200 uIU/mL Final   • Color, UA 11/03/2021 Yellow  Yellow, Straw Final   • Appearance, UA 11/03/2021 Clear  Clear Final   • pH, UA 11/03/2021 6.0  5.5 - 8.0 Final   • Specific Gravity, UA 11/03/2021 1.025  1.005 - 1.030 Final   • Glucose, UA 11/03/2021 Negative  Negative Final   • Ketones, UA 11/03/2021 Negative  Negative Final   • Bilirubin, UA 11/03/2021 Negative  Negative Final   • Blood, UA 11/03/2021 Negative  Negative Final   • Protein, UA 11/03/2021 Negative  Negative Final   • Leuk Esterase, UA 11/03/2021 Negative  Negative Final   • Nitrite, UA 11/03/2021 Negative  Negative Final   • Urobilinogen, UA 11/03/2021 0.2 E.U./dL  0.2 - 1.0 E.U./dL Final   • Hemoglobin A1C 11/03/2021 6.50* 4.80  - 5.60 % Final   • LDL Cholesterol  11/03/2021 75  0 - 100 mg/dL Final   • WBC 11/03/2021 8.77  3.40 - 10.80 10*3/mm3 Final   • RBC 11/03/2021 4.73  4.14 - 5.80 10*6/mm3 Final   • Hemoglobin 11/03/2021 13.4  13.0 - 17.7 g/dL Final   • Hematocrit 11/03/2021 41.8  37.5 - 51.0 % Final   • MCV 11/03/2021 88.4  79.0 - 97.0 fL Final   • MCH 11/03/2021 28.3  26.6 - 33.0 pg Final   • MCHC 11/03/2021 32.1  31.5 - 35.7 g/dL Final   • RDW 11/03/2021 14.5  12.3 - 15.4 % Final   • RDW-SD 11/03/2021 46.2  37.0 - 54.0 fl Final   • MPV 11/03/2021 11.4  6.0 - 12.0 fL Final   • Platelets 11/03/2021 237  140 - 450 10*3/mm3 Final   • Neutrophil % 11/03/2021 59.9  42.7 - 76.0 % Final   • Lymphocyte % 11/03/2021 22.5  19.6 - 45.3 % Final   • Monocyte % 11/03/2021 14.3* 5.0 - 12.0 % Final   • Eosinophil % 11/03/2021 2.6  0.3 - 6.2 % Final   • Basophil % 11/03/2021 0.7  0.0 - 1.5 % Final   • Neutrophils, Absolute 11/03/2021 5.26  1.70 - 7.00 10*3/mm3 Final   • Lymphocytes, Absolute 11/03/2021 1.97  0.70 - 3.10 10*3/mm3 Final   • Monocytes, Absolute 11/03/2021 1.25* 0.10 - 0.90 10*3/mm3 Final   • Eosinophils, Absolute 11/03/2021 0.23  0.00 - 0.40 10*3/mm3 Final   • Basophils, Absolute 11/03/2021 0.06  0.00 - 0.20 10*3/mm3 Final   ]

## 2021-12-13 ENCOUNTER — LAB (OUTPATIENT)
Dept: LAB | Facility: OTHER | Age: 81
End: 2021-12-13

## 2021-12-13 DIAGNOSIS — R35.0 URINARY FREQUENCY: Primary | ICD-10-CM

## 2021-12-13 LAB
QT INTERVAL: 426 MS
QTC INTERVAL: 500 MS

## 2021-12-13 PROCEDURE — 87086 URINE CULTURE/COLONY COUNT: CPT | Performed by: NURSE PRACTITIONER

## 2021-12-14 LAB — BACTERIA SPEC AEROBE CULT: NO GROWTH

## 2022-02-24 DIAGNOSIS — E11.8 TYPE 2 DIABETES MELLITUS WITH COMPLICATION, WITHOUT LONG-TERM CURRENT USE OF INSULIN: Chronic | ICD-10-CM

## 2022-02-24 RX ORDER — PIOGLITAZONEHYDROCHLORIDE 15 MG/1
TABLET ORAL
Qty: 90 TABLET | Refills: 3 | Status: SHIPPED | OUTPATIENT
Start: 2022-02-24 | End: 2022-04-06

## 2022-04-02 DIAGNOSIS — E11.8 TYPE 2 DIABETES MELLITUS WITH COMPLICATION, WITHOUT LONG-TERM CURRENT USE OF INSULIN: Primary | ICD-10-CM

## 2022-04-04 ENCOUNTER — TELEPHONE (OUTPATIENT)
Dept: FAMILY MEDICINE CLINIC | Facility: CLINIC | Age: 82
End: 2022-04-04

## 2022-04-04 DIAGNOSIS — E11.8 TYPE 2 DIABETES MELLITUS WITH COMPLICATION, WITHOUT LONG-TERM CURRENT USE OF INSULIN: ICD-10-CM

## 2022-04-04 RX ORDER — BLOOD-GLUCOSE METER
EACH MISCELLANEOUS
Qty: 1 KIT | Refills: 0 | Status: SHIPPED | OUTPATIENT
Start: 2022-04-04 | End: 2022-06-06

## 2022-04-04 NOTE — TELEPHONE ENCOUNTER
I called the patient to confirm that he was in need of a new device kit to monitor his glucose and he stated his previous device was no longer working properly. He also stated he was in need of a refill on testing strips as well. I told him I would place the orders for his supplies. He stated understanding and thanked me for the call.

## 2022-04-04 NOTE — TELEPHONE ENCOUNTER
Patient was seen by [facility]-Daniela Rizvi NP cardiology for NSTEMI f/u on 03/28/2022. Recommended to change Actos to Farxiga.

## 2022-04-04 NOTE — TELEPHONE ENCOUNTER
The patient stated that his cardiologist had recently discontinued his Actos 15 mg. He would like to know if  wants to substitute a medication for the Actos. I informed him I would pass his message along. He stated understanding and thanked me.

## 2022-04-05 DIAGNOSIS — E11.8 TYPE 2 DIABETES MELLITUS WITH COMPLICATION, WITHOUT LONG-TERM CURRENT USE OF INSULIN: ICD-10-CM

## 2022-04-06 DIAGNOSIS — E11.8 TYPE 2 DIABETES MELLITUS WITH COMPLICATION, WITHOUT LONG-TERM CURRENT USE OF INSULIN: ICD-10-CM

## 2022-04-06 NOTE — TELEPHONE ENCOUNTER
I called the patient and informed him to stop taking Actos 15 mg and begin taking Farxiga 10 mg daily as Dr. Dale directed. I informed him I would send a prescription to [facility] as he requested and discontinue Actos. He stated understanding and thanked me for the call.

## 2022-04-06 NOTE — TELEPHONE ENCOUNTER
I called the patient to check on the status of his glucose supplies and he informed me he had received his device kit but not his test strips. I stated I would send the script in again for clarification. He stated understanding and thanked me for the call.

## 2022-04-08 RX ORDER — DAPAGLIFLOZIN 10 MG/1
10 TABLET, FILM COATED ORAL DAILY
Qty: 90 TABLET | Refills: 0 | Status: SHIPPED | OUTPATIENT
Start: 2022-04-08 | End: 2022-06-23

## 2022-04-11 DIAGNOSIS — K21.9 GASTROESOPHAGEAL REFLUX DISEASE WITHOUT ESOPHAGITIS: Chronic | ICD-10-CM

## 2022-04-11 DIAGNOSIS — E03.9 ACQUIRED HYPOTHYROIDISM: Chronic | ICD-10-CM

## 2022-04-11 RX ORDER — LEVOTHYROXINE SODIUM 0.03 MG/1
TABLET ORAL
Qty: 90 TABLET | Refills: 3 | Status: SHIPPED | OUTPATIENT
Start: 2022-04-11

## 2022-04-11 RX ORDER — PANTOPRAZOLE SODIUM 40 MG/1
TABLET, DELAYED RELEASE ORAL
Qty: 90 TABLET | Refills: 3 | Status: SHIPPED | OUTPATIENT
Start: 2022-04-11

## 2022-04-13 ENCOUNTER — TRANSCRIBE ORDERS (OUTPATIENT)
Dept: GENERAL RADIOLOGY | Facility: CLINIC | Age: 82
End: 2022-04-13

## 2022-04-13 ENCOUNTER — LAB (OUTPATIENT)
Dept: LAB | Facility: OTHER | Age: 82
End: 2022-04-13

## 2022-04-13 DIAGNOSIS — I50.22 CHRONIC SYSTOLIC HEART FAILURE: ICD-10-CM

## 2022-04-13 DIAGNOSIS — I10 ESSENTIAL HYPERTENSION: ICD-10-CM

## 2022-04-13 DIAGNOSIS — I10 ESSENTIAL HYPERTENSION: Primary | ICD-10-CM

## 2022-04-13 LAB
ANION GAP SERPL CALCULATED.3IONS-SCNC: 10 MMOL/L (ref 5–15)
BUN SERPL-MCNC: 18 MG/DL (ref 7–23)
BUN/CREAT SERPL: 12.2 (ref 7–25)
CALCIUM SPEC-SCNC: 9.2 MG/DL (ref 8.4–10.2)
CHLORIDE SERPL-SCNC: 103 MMOL/L (ref 101–112)
CO2 SERPL-SCNC: 24 MMOL/L (ref 22–30)
CREAT SERPL-MCNC: 1.47 MG/DL (ref 0.7–1.3)
EGFRCR SERPLBLD CKD-EPI 2021: 47.9 ML/MIN/1.73
GLUCOSE SERPL-MCNC: 72 MG/DL (ref 70–99)
POTASSIUM SERPL-SCNC: 4.4 MMOL/L (ref 3.4–5)
SODIUM SERPL-SCNC: 137 MMOL/L (ref 137–145)

## 2022-04-13 PROCEDURE — 36415 COLL VENOUS BLD VENIPUNCTURE: CPT | Performed by: INTERNAL MEDICINE

## 2022-04-13 PROCEDURE — 80048 BASIC METABOLIC PNL TOTAL CA: CPT | Performed by: INTERNAL MEDICINE

## 2022-05-03 ENCOUNTER — APPOINTMENT (OUTPATIENT)
Dept: GENERAL RADIOLOGY | Facility: HOSPITAL | Age: 82
End: 2022-05-03

## 2022-05-03 ENCOUNTER — APPOINTMENT (OUTPATIENT)
Dept: CT IMAGING | Facility: HOSPITAL | Age: 82
End: 2022-05-03

## 2022-05-03 ENCOUNTER — HOSPITAL ENCOUNTER (OUTPATIENT)
Facility: HOSPITAL | Age: 82
Discharge: HOME | End: 2022-05-06
Attending: EMERGENCY MEDICINE | Admitting: UROLOGY

## 2022-05-03 DIAGNOSIS — K80.20 CALCULUS OF GALLBLADDER WITHOUT CHOLECYSTITIS WITHOUT OBSTRUCTION: ICD-10-CM

## 2022-05-03 DIAGNOSIS — N20.0 KIDNEY STONE: ICD-10-CM

## 2022-05-03 DIAGNOSIS — N17.9 AKI (ACUTE KIDNEY INJURY): ICD-10-CM

## 2022-05-03 DIAGNOSIS — N13.9 ACUTE BILATERAL OBSTRUCTIVE UROPATHY: Primary | ICD-10-CM

## 2022-05-03 LAB
ALBUMIN SERPL-MCNC: 4.2 G/DL (ref 3.5–5.2)
ALBUMIN/GLOB SERPL: 1.5 G/DL
ALP SERPL-CCNC: 67 U/L (ref 39–117)
ALT SERPL W P-5'-P-CCNC: 14 U/L (ref 1–41)
ANION GAP SERPL CALCULATED.3IONS-SCNC: 14 MMOL/L (ref 5–15)
AST SERPL-CCNC: 17 U/L (ref 1–40)
BASOPHILS # BLD AUTO: 0.05 10*3/MM3 (ref 0–0.2)
BASOPHILS NFR BLD AUTO: 0.5 % (ref 0–1.5)
BILIRUB SERPL-MCNC: 0.9 MG/DL (ref 0–1.2)
BILIRUB UR QL STRIP: NEGATIVE
BUN SERPL-MCNC: 27 MG/DL (ref 8–23)
BUN/CREAT SERPL: 11.3 (ref 7–25)
CALCIUM SPEC-SCNC: 9.3 MG/DL (ref 8.6–10.5)
CHLORIDE SERPL-SCNC: 103 MMOL/L (ref 98–107)
CLARITY UR: CLEAR
CO2 SERPL-SCNC: 22 MMOL/L (ref 22–29)
COLOR UR: YELLOW
CREAT SERPL-MCNC: 2.4 MG/DL (ref 0.76–1.27)
DEPRECATED RDW RBC AUTO: 44 FL (ref 37–54)
EGFRCR SERPLBLD CKD-EPI 2021: 26.6 ML/MIN/1.73
EOSINOPHIL # BLD AUTO: 0.12 10*3/MM3 (ref 0–0.4)
EOSINOPHIL NFR BLD AUTO: 1.1 % (ref 0.3–6.2)
ERYTHROCYTE [DISTWIDTH] IN BLOOD BY AUTOMATED COUNT: 14.5 % (ref 12.3–15.4)
FLUAV RNA RESP QL NAA+PROBE: NOT DETECTED
FLUBV RNA RESP QL NAA+PROBE: NOT DETECTED
GLOBULIN UR ELPH-MCNC: 2.8 GM/DL
GLUCOSE SERPL-MCNC: 144 MG/DL (ref 65–99)
GLUCOSE UR STRIP-MCNC: NEGATIVE MG/DL
HCT VFR BLD AUTO: 44.8 % (ref 37.5–51)
HGB BLD-MCNC: 14.6 G/DL (ref 13–17.7)
HGB UR QL STRIP.AUTO: NEGATIVE
HOLD SPECIMEN: NORMAL
HOLD SPECIMEN: NORMAL
IMM GRANULOCYTES # BLD AUTO: 0.04 10*3/MM3 (ref 0–0.05)
IMM GRANULOCYTES NFR BLD AUTO: 0.4 % (ref 0–0.5)
KETONES UR QL STRIP: NEGATIVE
LEUKOCYTE ESTERASE UR QL STRIP.AUTO: NEGATIVE
LIPASE SERPL-CCNC: 30 U/L (ref 13–60)
LYMPHOCYTES # BLD AUTO: 1.39 10*3/MM3 (ref 0.7–3.1)
LYMPHOCYTES NFR BLD AUTO: 12.8 % (ref 19.6–45.3)
MCH RBC QN AUTO: 27.5 PG (ref 26.6–33)
MCHC RBC AUTO-ENTMCNC: 32.6 G/DL (ref 31.5–35.7)
MCV RBC AUTO: 84.5 FL (ref 79–97)
MONOCYTES # BLD AUTO: 1.24 10*3/MM3 (ref 0.1–0.9)
MONOCYTES NFR BLD AUTO: 11.4 % (ref 5–12)
NEUTROPHILS NFR BLD AUTO: 7.99 10*3/MM3 (ref 1.7–7)
NEUTROPHILS NFR BLD AUTO: 73.8 % (ref 42.7–76)
NITRITE UR QL STRIP: NEGATIVE
NRBC BLD AUTO-RTO: 0 /100 WBC (ref 0–0.2)
PH UR STRIP.AUTO: 7 [PH] (ref 5–9)
PLATELET # BLD AUTO: 200 10*3/MM3 (ref 140–450)
PMV BLD AUTO: 11.2 FL (ref 6–12)
POTASSIUM SERPL-SCNC: 4.4 MMOL/L (ref 3.5–5.2)
PROT SERPL-MCNC: 7 G/DL (ref 6–8.5)
PROT UR QL STRIP: NEGATIVE
RBC # BLD AUTO: 5.3 10*6/MM3 (ref 4.14–5.8)
SARS-COV-2 RNA RESP QL NAA+PROBE: NOT DETECTED
SODIUM SERPL-SCNC: 139 MMOL/L (ref 136–145)
SP GR UR STRIP: 1.01 (ref 1–1.03)
UROBILINOGEN UR QL STRIP: NORMAL
WBC NRBC COR # BLD: 10.83 10*3/MM3 (ref 3.4–10.8)
WHOLE BLOOD HOLD SPECIMEN: NORMAL
WHOLE BLOOD HOLD SPECIMEN: NORMAL

## 2022-05-03 PROCEDURE — G0378 HOSPITAL OBSERVATION PER HR: HCPCS

## 2022-05-03 PROCEDURE — 85025 COMPLETE CBC W/AUTO DIFF WBC: CPT | Performed by: EMERGENCY MEDICINE

## 2022-05-03 PROCEDURE — 83690 ASSAY OF LIPASE: CPT | Performed by: EMERGENCY MEDICINE

## 2022-05-03 PROCEDURE — 87636 SARSCOV2 & INF A&B AMP PRB: CPT | Performed by: EMERGENCY MEDICINE

## 2022-05-03 PROCEDURE — 74176 CT ABD & PELVIS W/O CONTRAST: CPT

## 2022-05-03 PROCEDURE — 80053 COMPREHEN METABOLIC PANEL: CPT | Performed by: EMERGENCY MEDICINE

## 2022-05-03 PROCEDURE — 81003 URINALYSIS AUTO W/O SCOPE: CPT | Performed by: EMERGENCY MEDICINE

## 2022-05-03 PROCEDURE — 99284 EMERGENCY DEPT VISIT MOD MDM: CPT

## 2022-05-03 PROCEDURE — 93005 ELECTROCARDIOGRAM TRACING: CPT | Performed by: EMERGENCY MEDICINE

## 2022-05-03 PROCEDURE — 71045 X-RAY EXAM CHEST 1 VIEW: CPT

## 2022-05-03 PROCEDURE — 96361 HYDRATE IV INFUSION ADD-ON: CPT

## 2022-05-03 PROCEDURE — C9803 HOPD COVID-19 SPEC COLLECT: HCPCS

## 2022-05-03 PROCEDURE — 93010 ELECTROCARDIOGRAM REPORT: CPT | Performed by: INTERNAL MEDICINE

## 2022-05-03 RX ORDER — SODIUM CHLORIDE 9 MG/ML
75 INJECTION, SOLUTION INTRAVENOUS CONTINUOUS
Status: DISCONTINUED | OUTPATIENT
Start: 2022-05-03 | End: 2022-05-06 | Stop reason: HOSPADM

## 2022-05-03 RX ORDER — AMLODIPINE BESYLATE 10 MG/1
10 TABLET ORAL DAILY
Status: DISCONTINUED | OUTPATIENT
Start: 2022-05-04 | End: 2022-05-03

## 2022-05-03 RX ORDER — ACETAMINOPHEN 650 MG/1
650 SUPPOSITORY RECTAL EVERY 4 HOURS PRN
Status: DISCONTINUED | OUTPATIENT
Start: 2022-05-03 | End: 2022-05-06 | Stop reason: HOSPADM

## 2022-05-03 RX ORDER — SODIUM CHLORIDE 0.9 % (FLUSH) 0.9 %
10 SYRINGE (ML) INJECTION AS NEEDED
Status: DISCONTINUED | OUTPATIENT
Start: 2022-05-03 | End: 2022-05-06 | Stop reason: HOSPADM

## 2022-05-03 RX ORDER — ACETAMINOPHEN 325 MG/1
650 TABLET ORAL EVERY 4 HOURS PRN
Status: DISCONTINUED | OUTPATIENT
Start: 2022-05-03 | End: 2022-05-06 | Stop reason: HOSPADM

## 2022-05-03 RX ORDER — FOLIC ACID 1 MG/1
1000 TABLET ORAL DAILY
Status: DISCONTINUED | OUTPATIENT
Start: 2022-05-04 | End: 2022-05-06 | Stop reason: HOSPADM

## 2022-05-03 RX ORDER — PRAVASTATIN SODIUM 40 MG
40 TABLET ORAL NIGHTLY
Status: DISCONTINUED | OUTPATIENT
Start: 2022-05-03 | End: 2022-05-06 | Stop reason: HOSPADM

## 2022-05-03 RX ORDER — FLUTICASONE PROPIONATE 50 MCG
2 SPRAY, SUSPENSION (ML) NASAL DAILY
Status: DISCONTINUED | OUTPATIENT
Start: 2022-05-04 | End: 2022-05-06 | Stop reason: HOSPADM

## 2022-05-03 RX ORDER — SODIUM CHLORIDE 0.9 % (FLUSH) 0.9 %
10 SYRINGE (ML) INJECTION EVERY 12 HOURS SCHEDULED
Status: DISCONTINUED | OUTPATIENT
Start: 2022-05-03 | End: 2022-05-06 | Stop reason: HOSPADM

## 2022-05-03 RX ORDER — MORPHINE SULFATE 2 MG/ML
1 INJECTION, SOLUTION INTRAMUSCULAR; INTRAVENOUS EVERY 4 HOURS PRN
Status: DISCONTINUED | OUTPATIENT
Start: 2022-05-03 | End: 2022-05-04

## 2022-05-03 RX ORDER — ASPIRIN 81 MG/1
81 TABLET ORAL DAILY
Status: DISCONTINUED | OUTPATIENT
Start: 2022-05-04 | End: 2022-05-06 | Stop reason: HOSPADM

## 2022-05-03 RX ORDER — SACUBITRIL AND VALSARTAN 24; 26 MG/1; MG/1
1 TABLET, FILM COATED ORAL 2 TIMES DAILY
COMMUNITY
End: 2022-05-26

## 2022-05-03 RX ORDER — HYDROCODONE BITARTRATE AND ACETAMINOPHEN 5; 325 MG/1; MG/1
1 TABLET ORAL EVERY 4 HOURS PRN
Status: DISCONTINUED | OUTPATIENT
Start: 2022-05-03 | End: 2022-05-06 | Stop reason: HOSPADM

## 2022-05-03 RX ORDER — ACETAMINOPHEN 160 MG/5ML
650 SOLUTION ORAL EVERY 4 HOURS PRN
Status: DISCONTINUED | OUTPATIENT
Start: 2022-05-03 | End: 2022-05-06 | Stop reason: HOSPADM

## 2022-05-03 RX ORDER — LEVOTHYROXINE SODIUM 0.03 MG/1
25 TABLET ORAL DAILY
Status: DISCONTINUED | OUTPATIENT
Start: 2022-05-04 | End: 2022-05-06 | Stop reason: HOSPADM

## 2022-05-03 RX ORDER — CLOPIDOGREL BISULFATE 75 MG/1
75 TABLET ORAL DAILY
Status: DISCONTINUED | OUTPATIENT
Start: 2022-05-04 | End: 2022-05-06 | Stop reason: HOSPADM

## 2022-05-03 RX ORDER — CLOPIDOGREL BISULFATE 75 MG/1
75 TABLET ORAL DAILY
COMMUNITY

## 2022-05-03 RX ORDER — TAMSULOSIN HYDROCHLORIDE 0.4 MG/1
0.4 CAPSULE ORAL ONCE
Status: DISCONTINUED | OUTPATIENT
Start: 2022-05-03 | End: 2022-05-03

## 2022-05-03 RX ORDER — CARVEDILOL 6.25 MG/1
6.25 TABLET ORAL 2 TIMES DAILY WITH MEALS
COMMUNITY

## 2022-05-03 RX ORDER — PANTOPRAZOLE SODIUM 40 MG/1
40 TABLET, DELAYED RELEASE ORAL DAILY
Status: DISCONTINUED | OUTPATIENT
Start: 2022-05-04 | End: 2022-05-06 | Stop reason: HOSPADM

## 2022-05-03 RX ORDER — TAMSULOSIN HYDROCHLORIDE 0.4 MG/1
0.4 CAPSULE ORAL DAILY
Status: DISCONTINUED | OUTPATIENT
Start: 2022-05-04 | End: 2022-05-06 | Stop reason: HOSPADM

## 2022-05-03 RX ORDER — DOCUSATE SODIUM 100 MG/1
100 CAPSULE, LIQUID FILLED ORAL DAILY
Status: DISCONTINUED | OUTPATIENT
Start: 2022-05-04 | End: 2022-05-06 | Stop reason: HOSPADM

## 2022-05-03 RX ORDER — ONDANSETRON 2 MG/ML
4 INJECTION INTRAMUSCULAR; INTRAVENOUS EVERY 6 HOURS PRN
Status: DISCONTINUED | OUTPATIENT
Start: 2022-05-03 | End: 2022-05-06 | Stop reason: HOSPADM

## 2022-05-03 RX ORDER — SODIUM CHLORIDE 9 MG/ML
75 INJECTION, SOLUTION INTRAVENOUS CONTINUOUS
Status: DISCONTINUED | OUTPATIENT
Start: 2022-05-03 | End: 2022-05-03

## 2022-05-03 RX ORDER — CARVEDILOL 6.25 MG/1
6.25 TABLET ORAL 2 TIMES DAILY WITH MEALS
Status: DISCONTINUED | OUTPATIENT
Start: 2022-05-04 | End: 2022-05-06 | Stop reason: HOSPADM

## 2022-05-03 RX ORDER — NALOXONE HCL 0.4 MG/ML
0.4 VIAL (ML) INJECTION
Status: DISCONTINUED | OUTPATIENT
Start: 2022-05-03 | End: 2022-05-04

## 2022-05-03 RX ADMIN — SODIUM CHLORIDE 1000 ML: 9 INJECTION, SOLUTION INTRAVENOUS at 17:49

## 2022-05-03 RX ADMIN — SODIUM CHLORIDE 75 ML/HR: 9 INJECTION, SOLUTION INTRAVENOUS at 19:30

## 2022-05-03 RX ADMIN — SACUBITRIL AND VALSARTAN 1 TABLET: 24; 26 TABLET, FILM COATED ORAL at 23:24

## 2022-05-03 RX ADMIN — CARVEDILOL 6.25 MG: 6.25 TABLET, FILM COATED ORAL at 23:24

## 2022-05-03 RX ADMIN — PRAVASTATIN SODIUM 40 MG: 40 TABLET ORAL at 23:24

## 2022-05-03 NOTE — ED PROVIDER NOTES
"Subjective   83yo male pmh significant htn/hyperlipidemia/dm2/hypothyroid/bph/cad/chf/pacemaker/ckd/gerd presents ED c/o 2d hx intermittent \"sharp\" right flank pain/lower abdominal cramping/neg exac or relieve factors/neg associated symptoms.  ROS neg fever/chills/cough/chest pain/soa/n/v/d/dysuria/hematuria/melena/hematochoezia/ heamtemesis.      History provided by:  Patient  Abdominal Pain  Pain location:  R flank, LLQ, RLQ and suprapubic  Associated symptoms: constipation    Associated symptoms: no diarrhea, no dysuria, no hematuria, no nausea and no vomiting        Review of Systems   Constitutional: Negative.    HENT: Negative.    Respiratory: Negative.    Cardiovascular: Negative.    Gastrointestinal: Positive for abdominal pain and constipation. Negative for blood in stool, diarrhea, nausea and vomiting.   Genitourinary: Positive for flank pain. Negative for dysuria and hematuria.   Allergic/Immunologic: Negative for immunocompromised state.   Neurological: Negative.    All other systems reviewed and are negative.      Past Medical History:   Diagnosis Date   • Acquired hypothyroidism    • Acute urinary tract infection    • Anemia    • Benign non-nodular prostatic hyperplasia with lower urinary tract symptoms    • Diverticular disease of colon    • Essential hypertension    • Gastrointestinal hemorrhage    • History of myocardial infarction    • Hyperlipidemia    • Impacted cerumen    • Low back pain     rad to left hip   • Macular degeneration    • Macular degeneration 09/01/2017    Dr. Trotter in [place], [place]   • Myocardial infarct (HCC)    • Otalgia    • Primary osteoarthritis involving multiple joints    • Special screening for malignant neoplasms, colon    • Type 2 diabetes mellitus without complication (HCC)    • Uric acid renal calculus    • Urinary incontinence        Allergies   Allergen Reactions   • Contrast Dye Hives and GI Intolerance   • Iodine Hives     Hives and vomiting with contrast   • " Tuberculin Other (See Comments)     Redness and swelling       Past Surgical History:   Procedure Laterality Date   • CARDIAC CATHETERIZATION  2021   • CARDIAC SURGERY      Pace maker insertion 8-32-19 Dr. Charles   • COLONOSCOPY     • ENDOSCOPY AND COLONOSCOPY  02/2015    GI bleed   • EYE SURGERY      Lasik   • OTHER SURGICAL HISTORY  10/06/2015    Remove Impacted Cerumen 80835 (1)      • PACEMAKER IMPLANTATION  2019    [facility]    • TONSILLECTOMY  10/06/2015       Family History   Problem Relation Age of Onset   • Cancer Mother         liver   • Uterine cancer Mother    • Cancer Father         penis   • COPD Father    • Emphysema Father    • Diabetes Maternal Grandfather        Social History     Socioeconomic History   • Marital status:    Tobacco Use   • Smoking status: Never Smoker   • Smokeless tobacco: Never Used   Substance and Sexual Activity   • Alcohol use: No   • Drug use: No   • Sexual activity: Defer           Objective   Physical Exam  Vitals and nursing note reviewed.   Constitutional:       Appearance: Normal appearance.   HENT:      Head: Normocephalic and atraumatic.      Mouth/Throat:      Mouth: Mucous membranes are moist.   Eyes:      Pupils: Pupils are equal, round, and reactive to light.   Cardiovascular:      Rate and Rhythm: Normal rate and regular rhythm.      Pulses: Normal pulses.      Heart sounds: Normal heart sounds. No murmur heard.    No friction rub. No gallop.   Pulmonary:      Effort: Pulmonary effort is normal. No respiratory distress.      Breath sounds: Normal breath sounds. No wheezing, rhonchi or rales.   Abdominal:      General: Abdomen is flat. Bowel sounds are normal. There is no distension.      Palpations: Abdomen is soft.      Tenderness: There is no abdominal tenderness. There is no guarding or rebound.   Musculoskeletal:         General: No swelling or deformity.      Cervical back: Normal range of motion and neck supple. No rigidity.      Right lower  leg: No edema.      Left lower leg: No edema.   Lymphadenopathy:      Cervical: No cervical adenopathy.   Skin:     General: Skin is warm and dry.   Neurological:      General: No focal deficit present.      Mental Status: He is alert and oriented to person, place, and time.      GCS: GCS eye subscore is 4. GCS verbal subscore is 5. GCS motor subscore is 6.         ECG 12 Lead      Date/Time: 5/3/2022 5:33 PM  Performed by: Brina Ozuna MD  Authorized by: Brian Ozuna MD   Interpreted by physician  Rhythm: sinus rhythm  Rate: normal  BPM: 79  QRS axis: normal  Conduction: left bundle branch block  ST Depression: V5 and V6  T depression: II, III, aVF, V5 and V6  Clinical impression: abnormal ECG                 ED Course  ED Course as of 05/03/22 2056   Tue May 03, 2022   2052 Dr. Landa consulted. [SD]      ED Course User Index  [SD] Brian Ozuna MD      Labs Reviewed   COMPREHENSIVE METABOLIC PANEL - Abnormal; Notable for the following components:       Result Value    Glucose 144 (*)     BUN 27 (*)     Creatinine 2.40 (*)     eGFR 26.6 (*)     All other components within normal limits    Narrative:     GFR Normal >60  Chronic Kidney Disease <60  Kidney Failure <15     CBC WITH AUTO DIFFERENTIAL - Abnormal; Notable for the following components:    WBC 10.83 (*)     Lymphocyte % 12.8 (*)     Neutrophils, Absolute 7.99 (*)     Monocytes, Absolute 1.24 (*)     All other components within normal limits   LIPASE - Normal   URINALYSIS W/ MICROSCOPIC IF INDICATED (NO CULTURE) - Normal    Narrative:     Urine microscopic not indicated.   COVID-19 AND FLU A/B, NP SWAB IN TRANSPORT MEDIA 8-12 HR TAT   RAINBOW DRAW    Narrative:     The following orders were created for panel order [facility] Draw.  Procedure                               Abnormality         Status                     ---------                               -----------         ------                     Green Top (Gel)[[id number]]                                   Final result               Lavender Top[[id number]]                                     Final result               Gold Top - SST[[id number]]                                   Final result               Light Blue Top[[id number]]                                   Final result                 Please view results for these tests on the individual orders.   CBC AND DIFFERENTIAL    Narrative:     The following orders were created for panel order CBC & Differential.  Procedure                               Abnormality         Status                     ---------                               -----------         ------                     CBC Auto Differential[[id number]]        Abnormal            Final result                 Please view results for these tests on the individual orders.   GREEN TOP   LAVENDER TOP   GOLD TOP - SST   LIGHT BLUE TOP     CT Abdomen Pelvis Without Contrast    Result Date: 5/3/2022  Narrative: CT ABDOMEN PELVIS WITHOUT IV CONTRAST COMPARISONS: None. ADDITIONAL PERTINENT HISTORY: Abdominal pain. TECHNIQUE: Multiple axial images are obtained from the lung bases through the lesser trochanters without IV contrast.  One of the following dose optimization techniques was utilized in the performance of this exam: Automated exposure control; adjustment of the mA and/or kV according to the patient's size; or use of an iterative  reconstruction technique.  Specific details can be referenced in the facility's radiology CT exam operational policy. FINDINGS: Lung bases: Minimal bibasilar atelectatic change.. Free air and free fluid: None. Liver: Negative for a noncontrasted examination.. Spleen: Negative for a noncontrasted examination. Adrenal glands: Negative. Kidneys, ureters and urinary bladder: Moderate right-sided hydronephrosis with a 3 mm obstructing calculus at the right ureterovesicular junction. Mild left-sided hydroureteronephrosis with a 4 mm obstructing calculus at the level of the left  ureterovesicular junction. Nonspecific stranding about both kidneys consistent with underlying atrophy. Small simple appearing cyst involving the midportion of the left kidney. Pancreas: Grossly negative. Gallbladder: Cholelithiasis without evidence of acute cholecystitis.. Bowel and mesentery: Diverticulosis without evidence of diverticulitis involving the descending colon and the sigmoid colon. No bowel obstruction. Normal-appearing appendix in the right lower quadrant.. Lymph node assessment: Negative Abdominal pelvic vasculature: Atherosclerotic disease of the abdominal aorta and its major branches.. Intrapelvic contents: Moderate enlargement of the prostate.. Surrounding soft tissues: Negative. Osseous structures: Spondylitic change involving the lumbar spine..     Impression: 1. Bilateral obstructing ureterovesicular junction calculi with moderate right-sided hydroureteronephrosis and mild left-sided hydroureteronephrosis. 2. Diverticulosis without evidence of diverticulitis. Electronically signed by:  Kody Stroud MD  5/3/2022 7:30 PM CDT Workstation: [id number]    XR Chest 1 View    Result Date: 5/3/2022  Narrative: INDICATION: abd pain EXAMINATION/TECHNIQUE: X-RAY - XR CHEST 1 VIEW COMPARISON: Chest x-ray 12/31/2021 ____________________________________________ FINDINGS:  LINES/DEVICES: Pacemaker leads are stable in position. LUNGS: Lung markings are prominent but unchanged. There is no focal consolidation. There is no pleural effusion or pneumothorax. MEDIASTINUM AND CARDIOVASCULAR STRUCTURES: Cardiac silhouette not enlarged. Central airways and mediastinal contour are unremarkable. BONES AND SOFT TISSUES: Unremarkable.     Impression: No radiographic evidence of acute cardiopulmonary disease. Electronically signed by:  Karri Estevez MD  5/3/2022 6:19 PM CDT Workstation: [id number]                                               [facility]    Final diagnoses:   Acute bilateral obstructive uropathy   MARV (acute kidney  injury) (HCC)   Calculus of gallbladder without cholecystitis without obstruction       ED Disposition  ED Disposition     ED Disposition   Decision to Admit    Condition   --    Comment   Level of Care: Med/Surg [1]   Admitting Physician: ANNE HONG [[id number]]   Attending Physician: ANNE HONG [[id number]]   Patient Class: Observation [104]               No follow-up provider specified.       Medication List      No changes were made to your prescriptions during this visit.          Brian Ozuna MD  05/03/22 [id number]

## 2022-05-03 NOTE — ED TRIAGE NOTES
Pt presents to ED with complaints of right flank pain and abdominal cramps that started Saturday night. Pt states he tried some OTC laxative and did have a bowel movement but states that he still has the urge to go and cant. VSS. NAD noted.

## 2022-05-04 ENCOUNTER — ANESTHESIA EVENT (OUTPATIENT)
Dept: PERIOP | Facility: HOSPITAL | Age: 82
End: 2022-05-04

## 2022-05-04 ENCOUNTER — ANESTHESIA (OUTPATIENT)
Dept: PERIOP | Facility: HOSPITAL | Age: 82
End: 2022-05-04

## 2022-05-04 ENCOUNTER — APPOINTMENT (OUTPATIENT)
Dept: GENERAL RADIOLOGY | Facility: HOSPITAL | Age: 82
End: 2022-05-04

## 2022-05-04 PROBLEM — N20.0 KIDNEY STONE: Status: ACTIVE | Noted: 2022-05-03

## 2022-05-04 LAB
ANION GAP SERPL CALCULATED.3IONS-SCNC: 10 MMOL/L (ref 5–15)
BASOPHILS # BLD AUTO: 0.03 10*3/MM3 (ref 0–0.2)
BASOPHILS NFR BLD AUTO: 0.4 % (ref 0–1.5)
BUN SERPL-MCNC: 23 MG/DL (ref 8–23)
BUN/CREAT SERPL: 10.6 (ref 7–25)
CALCIUM SPEC-SCNC: 8.5 MG/DL (ref 8.6–10.5)
CHLORIDE SERPL-SCNC: 110 MMOL/L (ref 98–107)
CO2 SERPL-SCNC: 21 MMOL/L (ref 22–29)
CREAT SERPL-MCNC: 2.16 MG/DL (ref 0.76–1.27)
DEPRECATED RDW RBC AUTO: 44.1 FL (ref 37–54)
EGFRCR SERPLBLD CKD-EPI 2021: 30.2 ML/MIN/1.73
EOSINOPHIL # BLD AUTO: 0.17 10*3/MM3 (ref 0–0.4)
EOSINOPHIL NFR BLD AUTO: 2.3 % (ref 0.3–6.2)
ERYTHROCYTE [DISTWIDTH] IN BLOOD BY AUTOMATED COUNT: 14.3 % (ref 12.3–15.4)
GLUCOSE BLDC GLUCOMTR-MCNC: 73 MG/DL (ref 70–130)
GLUCOSE BLDC GLUCOMTR-MCNC: 85 MG/DL (ref 70–130)
GLUCOSE BLDC GLUCOMTR-MCNC: 86 MG/DL (ref 70–130)
GLUCOSE BLDC GLUCOMTR-MCNC: 93 MG/DL (ref 70–130)
GLUCOSE SERPL-MCNC: 100 MG/DL (ref 65–99)
HCT VFR BLD AUTO: 37.9 % (ref 37.5–51)
HGB BLD-MCNC: 12.5 G/DL (ref 13–17.7)
IMM GRANULOCYTES # BLD AUTO: 0.02 10*3/MM3 (ref 0–0.05)
IMM GRANULOCYTES NFR BLD AUTO: 0.3 % (ref 0–0.5)
LYMPHOCYTES # BLD AUTO: 1.75 10*3/MM3 (ref 0.7–3.1)
LYMPHOCYTES NFR BLD AUTO: 23.3 % (ref 19.6–45.3)
MCH RBC QN AUTO: 27.8 PG (ref 26.6–33)
MCHC RBC AUTO-ENTMCNC: 33 G/DL (ref 31.5–35.7)
MCV RBC AUTO: 84.4 FL (ref 79–97)
MONOCYTES # BLD AUTO: 1.13 10*3/MM3 (ref 0.1–0.9)
MONOCYTES NFR BLD AUTO: 15.1 % (ref 5–12)
NEUTROPHILS NFR BLD AUTO: 4.4 10*3/MM3 (ref 1.7–7)
NEUTROPHILS NFR BLD AUTO: 58.6 % (ref 42.7–76)
NRBC BLD AUTO-RTO: 0 /100 WBC (ref 0–0.2)
PLATELET # BLD AUTO: 170 10*3/MM3 (ref 140–450)
PMV BLD AUTO: 11.3 FL (ref 6–12)
POTASSIUM SERPL-SCNC: 4 MMOL/L (ref 3.5–5.2)
RBC # BLD AUTO: 4.49 10*6/MM3 (ref 4.14–5.8)
SODIUM SERPL-SCNC: 141 MMOL/L (ref 136–145)
WBC NRBC COR # BLD: 7.5 10*3/MM3 (ref 3.4–10.8)

## 2022-05-04 PROCEDURE — 25010000002 MORPHINE PER 10 MG: Performed by: NURSE PRACTITIONER

## 2022-05-04 PROCEDURE — G0378 HOSPITAL OBSERVATION PER HR: HCPCS

## 2022-05-04 PROCEDURE — 25010000002 MORPHINE PER 10 MG: Performed by: UROLOGY

## 2022-05-04 PROCEDURE — 25010000002 CEFAZOLIN PER 500 MG: Performed by: NURSE ANESTHETIST, CERTIFIED REGISTERED

## 2022-05-04 PROCEDURE — 25010000002 MIDAZOLAM PER 1 MG: Performed by: NURSE ANESTHETIST, CERTIFIED REGISTERED

## 2022-05-04 PROCEDURE — 74420 UROGRAPHY RTRGR +-KUB: CPT

## 2022-05-04 PROCEDURE — 25010000002 FENTANYL CITRATE (PF) 50 MCG/ML SOLUTION: Performed by: NURSE ANESTHETIST, CERTIFIED REGISTERED

## 2022-05-04 PROCEDURE — 96374 THER/PROPH/DIAG INJ IV PUSH: CPT

## 2022-05-04 PROCEDURE — C1758 CATHETER, URETERAL: HCPCS | Performed by: UROLOGY

## 2022-05-04 PROCEDURE — 80048 BASIC METABOLIC PNL TOTAL CA: CPT | Performed by: FAMILY MEDICINE

## 2022-05-04 PROCEDURE — C2617 STENT, NON-COR, TEM W/O DEL: HCPCS | Performed by: UROLOGY

## 2022-05-04 PROCEDURE — C1769 GUIDE WIRE: HCPCS | Performed by: UROLOGY

## 2022-05-04 PROCEDURE — 85025 COMPLETE CBC W/AUTO DIFF WBC: CPT | Performed by: FAMILY MEDICINE

## 2022-05-04 PROCEDURE — 96376 TX/PRO/DX INJ SAME DRUG ADON: CPT

## 2022-05-04 PROCEDURE — 25010000002 PROPOFOL 10 MG/ML EMULSION: Performed by: NURSE ANESTHETIST, CERTIFIED REGISTERED

## 2022-05-04 PROCEDURE — 82962 GLUCOSE BLOOD TEST: CPT

## 2022-05-04 PROCEDURE — 25010000002 MORPHINE PER 10 MG: Performed by: FAMILY MEDICINE

## 2022-05-04 PROCEDURE — 25010000002 IOPAMIDOL 61 % SOLUTION: Performed by: UROLOGY

## 2022-05-04 PROCEDURE — 96361 HYDRATE IV INFUSION ADD-ON: CPT

## 2022-05-04 DEVICE — URETERAL STENT
Type: IMPLANTABLE DEVICE | Site: URETER | Status: FUNCTIONAL
Brand: POLARIS™ ULTRA

## 2022-05-04 RX ORDER — MORPHINE SULFATE 2 MG/ML
2 INJECTION, SOLUTION INTRAMUSCULAR; INTRAVENOUS ONCE
Status: COMPLETED | OUTPATIENT
Start: 2022-05-04 | End: 2022-05-04

## 2022-05-04 RX ORDER — PROMETHAZINE HYDROCHLORIDE 25 MG/1
25 TABLET ORAL ONCE AS NEEDED
Status: DISCONTINUED | OUTPATIENT
Start: 2022-05-04 | End: 2022-05-04 | Stop reason: HOSPADM

## 2022-05-04 RX ORDER — ONDANSETRON 4 MG/1
4 TABLET, FILM COATED ORAL EVERY 6 HOURS PRN
Status: DISCONTINUED | OUTPATIENT
Start: 2022-05-04 | End: 2022-05-04

## 2022-05-04 RX ORDER — ACETAMINOPHEN 325 MG/1
650 TABLET ORAL ONCE AS NEEDED
Status: DISCONTINUED | OUTPATIENT
Start: 2022-05-04 | End: 2022-05-04 | Stop reason: HOSPADM

## 2022-05-04 RX ORDER — FENTANYL CITRATE 50 UG/ML
INJECTION, SOLUTION INTRAMUSCULAR; INTRAVENOUS AS NEEDED
Status: DISCONTINUED | OUTPATIENT
Start: 2022-05-04 | End: 2022-05-04 | Stop reason: SURG

## 2022-05-04 RX ORDER — PROMETHAZINE HYDROCHLORIDE 25 MG/1
25 SUPPOSITORY RECTAL ONCE AS NEEDED
Status: DISCONTINUED | OUTPATIENT
Start: 2022-05-04 | End: 2022-05-04 | Stop reason: HOSPADM

## 2022-05-04 RX ORDER — ONDANSETRON 2 MG/ML
4 INJECTION INTRAMUSCULAR; INTRAVENOUS EVERY 6 HOURS PRN
Status: DISCONTINUED | OUTPATIENT
Start: 2022-05-04 | End: 2022-05-04

## 2022-05-04 RX ORDER — ONDANSETRON 2 MG/ML
4 INJECTION INTRAMUSCULAR; INTRAVENOUS ONCE AS NEEDED
Status: DISCONTINUED | OUTPATIENT
Start: 2022-05-04 | End: 2022-05-04 | Stop reason: HOSPADM

## 2022-05-04 RX ORDER — INSULIN ASPART 100 [IU]/ML
0-7 INJECTION, SOLUTION INTRAVENOUS; SUBCUTANEOUS
Status: DISCONTINUED | OUTPATIENT
Start: 2022-05-04 | End: 2022-05-06 | Stop reason: HOSPADM

## 2022-05-04 RX ORDER — MIDAZOLAM HYDROCHLORIDE 1 MG/ML
INJECTION INTRAMUSCULAR; INTRAVENOUS AS NEEDED
Status: DISCONTINUED | OUTPATIENT
Start: 2022-05-04 | End: 2022-05-04 | Stop reason: SURG

## 2022-05-04 RX ORDER — FLUMAZENIL 0.1 MG/ML
0.2 INJECTION INTRAVENOUS AS NEEDED
Status: DISCONTINUED | OUTPATIENT
Start: 2022-05-04 | End: 2022-05-04 | Stop reason: HOSPADM

## 2022-05-04 RX ORDER — MORPHINE SULFATE 2 MG/ML
2 INJECTION, SOLUTION INTRAMUSCULAR; INTRAVENOUS
Status: DISCONTINUED | OUTPATIENT
Start: 2022-05-04 | End: 2022-05-06 | Stop reason: HOSPADM

## 2022-05-04 RX ORDER — NICOTINE POLACRILEX 4 MG
15 LOZENGE BUCCAL
Status: DISCONTINUED | OUTPATIENT
Start: 2022-05-04 | End: 2022-05-06 | Stop reason: HOSPADM

## 2022-05-04 RX ORDER — EPHEDRINE SULFATE 50 MG/ML
5 INJECTION, SOLUTION INTRAVENOUS ONCE AS NEEDED
Status: DISCONTINUED | OUTPATIENT
Start: 2022-05-04 | End: 2022-05-04 | Stop reason: HOSPADM

## 2022-05-04 RX ORDER — NALOXONE HCL 0.4 MG/ML
0.4 VIAL (ML) INJECTION
Status: DISCONTINUED | OUTPATIENT
Start: 2022-05-04 | End: 2022-05-06 | Stop reason: HOSPADM

## 2022-05-04 RX ORDER — DIPHENHYDRAMINE HYDROCHLORIDE 50 MG/ML
12.5 INJECTION INTRAMUSCULAR; INTRAVENOUS
Status: DISCONTINUED | OUTPATIENT
Start: 2022-05-04 | End: 2022-05-04 | Stop reason: HOSPADM

## 2022-05-04 RX ORDER — ACETAMINOPHEN 650 MG/1
650 SUPPOSITORY RECTAL ONCE AS NEEDED
Status: DISCONTINUED | OUTPATIENT
Start: 2022-05-04 | End: 2022-05-04 | Stop reason: HOSPADM

## 2022-05-04 RX ORDER — DEXTROSE MONOHYDRATE 25 G/50ML
25 INJECTION, SOLUTION INTRAVENOUS
Status: DISCONTINUED | OUTPATIENT
Start: 2022-05-04 | End: 2022-05-06 | Stop reason: HOSPADM

## 2022-05-04 RX ORDER — NALOXONE HCL 0.4 MG/ML
0.4 VIAL (ML) INJECTION AS NEEDED
Status: DISCONTINUED | OUTPATIENT
Start: 2022-05-04 | End: 2022-05-04 | Stop reason: HOSPADM

## 2022-05-04 RX ORDER — DEXTROSE AND SODIUM CHLORIDE 5; .45 G/100ML; G/100ML
100 INJECTION, SOLUTION INTRAVENOUS CONTINUOUS
Status: DISCONTINUED | OUTPATIENT
Start: 2022-05-04 | End: 2022-05-05

## 2022-05-04 RX ORDER — CEFAZOLIN SODIUM 1 G/3ML
INJECTION, POWDER, FOR SOLUTION INTRAMUSCULAR; INTRAVENOUS AS NEEDED
Status: DISCONTINUED | OUTPATIENT
Start: 2022-05-04 | End: 2022-05-04 | Stop reason: SURG

## 2022-05-04 RX ORDER — PROPOFOL 10 MG/ML
VIAL (ML) INTRAVENOUS AS NEEDED
Status: DISCONTINUED | OUTPATIENT
Start: 2022-05-04 | End: 2022-05-04 | Stop reason: SURG

## 2022-05-04 RX ADMIN — MORPHINE SULFATE 2 MG: 2 INJECTION, SOLUTION INTRAMUSCULAR; INTRAVENOUS at 21:35

## 2022-05-04 RX ADMIN — PROPOFOL 50 MG: 10 INJECTION, EMULSION INTRAVENOUS at 19:34

## 2022-05-04 RX ADMIN — MORPHINE SULFATE 1 MG: 2 INJECTION, SOLUTION INTRAMUSCULAR; INTRAVENOUS at 10:18

## 2022-05-04 RX ADMIN — SODIUM CHLORIDE 125 ML/HR: 9 INJECTION, SOLUTION INTRAVENOUS at 05:01

## 2022-05-04 RX ADMIN — FENTANYL CITRATE 25 MCG: 50 INJECTION INTRAMUSCULAR; INTRAVENOUS at 19:36

## 2022-05-04 RX ADMIN — PROPOFOL 30 MG: 10 INJECTION, EMULSION INTRAVENOUS at 19:35

## 2022-05-04 RX ADMIN — MIDAZOLAM HYDROCHLORIDE 2 MG: 1 INJECTION, SOLUTION INTRAMUSCULAR; INTRAVENOUS at 19:31

## 2022-05-04 RX ADMIN — DEXTROSE AND SODIUM CHLORIDE 100 ML/HR: 5; 450 INJECTION, SOLUTION INTRAVENOUS at 21:36

## 2022-05-04 RX ADMIN — MORPHINE SULFATE 2 MG: 2 INJECTION, SOLUTION INTRAMUSCULAR; INTRAVENOUS at 12:08

## 2022-05-04 RX ADMIN — FENTANYL CITRATE 25 MCG: 50 INJECTION INTRAMUSCULAR; INTRAVENOUS at 19:46

## 2022-05-04 RX ADMIN — CEFAZOLIN SODIUM 1 G: 1 INJECTION, POWDER, FOR SOLUTION INTRAMUSCULAR; INTRAVENOUS at 19:39

## 2022-05-04 RX ADMIN — SODIUM CHLORIDE 75 ML/HR: 9 INJECTION, SOLUTION INTRAVENOUS at 15:02

## 2022-05-04 NOTE — PLAN OF CARE
Goal Outcome Evaluation:  Plan of Care Reviewed With: patient        Progress: improving  Outcome Evaluation: VSS. New admit this shift. Pt denies any urinary symptoms. Tolerating IV fluids well. NPO since midnight. No acute events overnight.

## 2022-05-04 NOTE — H&P
[facility] Medicine Admission      Date of Admission: 5/3/2022      Primary Care Physician: Serg Dale MD      Chief Complaint: Abdominal pain    HPI: Mr. Fraga is a very pleasant 82-year-old gentleman who presents to the emergency department with complaints of abdominal pain.  The pain has been ongoing for about 3 days.  He felt he was constipated and related his pain to that.  He denies changes in urination.  He denies dysuria or hematuria.  He denies flank pain.  He denies fevers or chills.        Concurrent Medical History:  has a past medical history of Acquired hypothyroidism, Acute urinary tract infection, Anemia, Benign non-nodular prostatic hyperplasia with lower urinary tract symptoms, Diverticular disease of colon, Essential hypertension, Gastrointestinal hemorrhage, History of myocardial infarction, Hyperlipidemia, Impacted cerumen, Low back pain, Macular degeneration, Macular degeneration (09/01/2017), Myocardial infarct (HCC), Otalgia, Primary osteoarthritis involving multiple joints, Special screening for malignant neoplasms, colon, Type 2 diabetes mellitus without complication (HCC), Uric acid renal calculus, and Urinary incontinence.    Past Surgical History:  has a past surgical history that includes endoscopy and colonoscopy (02/2015); Other surgical history (10/06/2015); Tonsillectomy (10/06/2015); Colonoscopy; Eye surgery; Cardiac surgery; Pacemaker Implantation (2019); and Cardiac catheterization (2021).    Family History: family history includes COPD in his father; Cancer in his father and mother; Diabetes in his maternal grandfather; Emphysema in his father; Uterine cancer in his mother.     Social History:  reports that he has never smoked. He has never used smokeless tobacco. He reports that he does not drink alcohol and does not use drugs.    Allergies:   Allergies   Allergen Reactions   • Contrast Dye Hives and GI Intolerance   • Iodine  Hives     Hives and vomiting with contrast   • Tuberculin Other (See Comments)     Redness and swelling       Medications:   Prior to Admission medications    Medication Sig Start Date End Date Taking? Authorizing Provider   gabapentin (NEURONTIN) 100 MG capsule Take 1 capsule by mouth 3 (Three) Times a Day. 9/27/21  Yes Serg Dale MD   glimepiride (AMARYL) 4 MG tablet TAKE 1 TABLET TWICE A DAY 12/3/21  Yes Serg Dale MD   Janumet  MG per tablet TAKE 1 TABLET TWICE A DAY 12/3/21  Yes Serg Dale MD   tamsulosin (FLOMAX) 0.4 MG capsule 24 hr capsule TAKE 1 CAPSULE DAILY 1/14/19  Yes Serg Dale MD   amLODIPine (NORVASC) 10 MG tablet TAKE 1 TABLET DAILY 8/30/21   Serg Dale MD   aspirin 81 MG EC tablet Take 81 mg by mouth Daily.    Eric King MD   benzonatate (TESSALON) 100 MG capsule Take 1 capsule by mouth 3 (Three) Times a Day As Needed for Cough. 12/31/21   Delmis Pacheco APRN   Blood Glucose Monitoring Suppl (Contour Next EZ) w/Device kit USE AS DIRECTED TO CHECK BLOOD SUGAR DAILY 4/4/22   Heather Garcia MD   Calcium Polycarbophil (FIBERCON PO) Take 2 tablets by mouth Daily.    Eric King MD   clobetasol (OLUX) 0.05 % topical foam  12/31/16   Eric King MD   Dapagliflozin Propanediol (Farxiga) 10 MG tablet Take 10 mg by mouth Daily. 4/8/22   Serg Dale MD   Docusate Sodium (STOOL SOFTENER) 100 MG capsule Take 100 mg by mouth Daily.    Eric King MD   fluticasone (FLONASE) 50 MCG/ACT nasal spray 2 sprays into the nostril(s) as directed by provider Daily. Administer 2 sprays in each nostril for each dose. 12/28/20   Serg Dale MD   folic acid (FOLVITE) 1 MG tablet TAKE 1 TABLET DAILY 12/3/21   Serg Dale MD   glucose blood test strip Use test strips to check glucose once daily. 4/6/22   Heather Garcia MD   glucose monitor monitoring kit 1 each Daily. 9/17/19   Serg Dale MD   Lancets misc Use to test  blood sugar once daily 9/17/19   Serg Dale MD   levothyroxine (SYNTHROID, LEVOTHROID) 25 MCG tablet TAKE 1 TABLET DAILY 4/11/22   Serg Dale MD   metoprolol succinate XL (TOPROL-XL) 50 MG 24 hr tablet Take 1 tablet by mouth Daily for 31 days. 12/10/21 1/10/22  Serg Dale MD   Multiple Vitamins-Minerals (PRESERVISION AREDS 2+MULTI VIT) capsule Take 1 capsule by mouth 2 (Two) Times a Day.    Provider, MD Eric   nitroglycerin (NITROSTAT) 0.3 MG SL tablet Place 1 tablet under the tongue Every 5 (Five) Minutes As Needed for Chest Pain. 12/10/21   Serg Dale MD   pantoprazole (PROTONIX) 40 MG EC tablet TAKE 1 TABLET DAILY 4/11/22   Serg Dale MD   pravastatin (PRAVACHOL) 40 MG tablet TAKE 1 TABLET EVERY NIGHT 10/21/21   Serg Dale MD   Probiotic Product (PROBIOTIC DAILY PO) Take 1 capsule by mouth Daily.    Provider, MD Eric   promethazine-dextromethorphan (PROMETHAZINE-DM) 6.25-15 MG/5ML syrup Take 5 mL by mouth At Night As Needed for Cough. 12/31/21   Delmis Pacheco APRN       Review of Systems:  Review of Systems   Comprehensive review of systems completed.  Pertinent positives and negatives per HPI.  All others reviewed and negative.    Physical Exam:   Temp:  [97.7 °F (36.5 °C)] 97.7 °F (36.5 °C)  Heart Rate:  [75-84] 75  Resp:  [16-20] 18  BP: (144-164)/(71-78) 156/77  Physical Exam  Constitutional:       General: He is not in acute distress.     Appearance: Normal appearance. He is not ill-appearing.   HENT:      Head: Normocephalic and atraumatic.      Right Ear: External ear normal.      Left Ear: External ear normal.      Nose: Nose normal.      Mouth/Throat:      Mouth: Mucous membranes are moist.      Pharynx: Oropharynx is clear.   Eyes:      General: No scleral icterus.     Extraocular Movements: Extraocular movements intact.      Pupils: Pupils are equal, round, and reactive to light.   Cardiovascular:      Rate and Rhythm: Normal rate and regular  rhythm.      Heart sounds: Normal heart sounds. No murmur heard.    No friction rub. No gallop.   Pulmonary:      Breath sounds: Normal breath sounds. No wheezing, rhonchi or rales.   Abdominal:      Palpations: Abdomen is soft.      Tenderness: There is no abdominal tenderness. There is no right CVA tenderness, left CVA tenderness, guarding or rebound.   Musculoskeletal:      Cervical back: Neck supple.      Right lower leg: No edema.      Left lower leg: No edema.   Lymphadenopathy:      Cervical: No cervical adenopathy.   Skin:     General: Skin is warm and dry.      Findings: No rash.   Neurological:      General: No focal deficit present.      Mental Status: He is alert and oriented to person, place, and time. Mental status is at baseline.   Psychiatric:         Mood and Affect: Mood normal.         Behavior: Behavior normal.           Results Reviewed:  I have personally reviewed current lab, radiology, and data and agree with results.  Lab Results (last 24 hours)     Procedure Component Value Units Date/Time    COVID-19 and FLU A/B PCR - Swab, Nasopharynx [[id number]]  (Normal) Collected: 05/03/22 2105    Specimen: Swab from Nasopharynx Updated: 05/03/22 2132     COVID19 Not Detected     Influenza A PCR Not Detected     Influenza B PCR Not Detected    Narrative:      Fact sheet for providers: https://www.fda.gov/media/664064/download    Fact sheet for patients: https://www.fda.gov/media/351736/download    Test performed by PCR.    [place] Draw [[id number]] Collected: 05/03/22 1702    Specimen: Blood Updated: 05/03/22 1817    Narrative:      The following orders were created for panel order [place] Draw.  Procedure                               Abnormality         Status                     ---------                               -----------         ------                     Green Top (Gel)[[id number]]                                  Final result               Lavender Top[[id number]]                                      Final result               Gold Top - SST[[id number]]                                   Final result               Light Blue Top[[id number]]                                   Final result                 Please view results for these tests on the individual orders.    Green Top (Gel) [[id number]] Collected: 05/03/22 1702    Specimen: Blood Updated: 05/03/22 1817     Extra Tube Hold for add-ons.     Comment: Auto resulted.       Gold Top - SST [[id number]] Collected: 05/03/22 1702    Specimen: Blood Updated: 05/03/22 1817     Extra Tube Hold for add-ons.     Comment: Auto resulted.       Lavender Top [[id number]] Collected: 05/03/22 1702    Specimen: Blood Updated: 05/03/22 1817     Extra Tube hold for add-on     Comment: Auto resulted       Light Blue Top [[id number]] Collected: 05/03/22 1702    Specimen: Blood Updated: 05/03/22 1817     Extra Tube hold for add-on     Comment: Auto resulted       Urinalysis With Microscopic If Indicated (No Culture) - Urine, Clean Catch [[id number]]  (Normal) Collected: 05/03/22 1711    Specimen: Urine, Clean Catch Updated: 05/03/22 1728     Color, UA Yellow     Appearance, UA Clear     pH, UA 7.0     Specific Gravity, UA 1.010     Glucose, UA Negative     Ketones, UA Negative     Bilirubin, UA Negative     Blood, UA Negative     Protein, UA Negative     Leuk Esterase, UA Negative     Nitrite, UA Negative     Urobilinogen, UA 0.2 E.U./dL    Narrative:      Urine microscopic not indicated.    Comprehensive Metabolic Panel [[id number]]  (Abnormal) Collected: 05/03/22 1702    Specimen: Blood Updated: 05/03/22 1725     Glucose 144 mg/dL      BUN 27 mg/dL      Creatinine 2.40 mg/dL      Sodium 139 mmol/L      Potassium 4.4 mmol/L      Chloride 103 mmol/L      CO2 22.0 mmol/L      Calcium 9.3 mg/dL      Total Protein 7.0 g/dL      Albumin 4.20 g/dL      ALT (SGPT) 14 U/L      AST (SGOT) 17 U/L      Alkaline Phosphatase 67 U/L      Total Bilirubin 0.9 mg/dL      Globulin 2.8 gm/dL       A/G Ratio 1.5 g/dL      BUN/Creatinine Ratio 11.3     Anion Gap 14.0 mmol/L      eGFR 26.6 mL/min/1.73      Comment: National Kidney Foundation and American Society of Nephrology (ASN) Task Force recommended calculation based on the Chronic Kidney Disease Epidemiology Collaboration (CKD-EPI) equation refit without adjustment for race.       Narrative:      GFR Normal >60  Chronic Kidney Disease <60  Kidney Failure <15      Lipase [[id number]]  (Normal) Collected: 05/03/22 1702    Specimen: Blood Updated: 05/03/22 1725     Lipase 30 U/L     CBC & Differential [[id number]]  (Abnormal) Collected: 05/03/22 1702    Specimen: Blood Updated: 05/03/22 1706    Narrative:      The following orders were created for panel order CBC & Differential.  Procedure                               Abnormality         Status                     ---------                               -----------         ------                     CBC Auto Differential[[id number]]        Abnormal            Final result                 Please view results for these tests on the individual orders.    CBC Auto Differential [[id number]]  (Abnormal) Collected: 05/03/22 1702    Specimen: Blood Updated: 05/03/22 1706     WBC 10.83 10*3/mm3      RBC 5.30 10*6/mm3      Hemoglobin 14.6 g/dL      Hematocrit 44.8 %      MCV 84.5 fL      MCH 27.5 pg      MCHC 32.6 g/dL      RDW 14.5 %      RDW-SD 44.0 fl      MPV 11.2 fL      Platelets 200 10*3/mm3      Neutrophil % 73.8 %      Lymphocyte % 12.8 %      Monocyte % 11.4 %      Eosinophil % 1.1 %      Basophil % 0.5 %      Immature Grans % 0.4 %      Neutrophils, Absolute 7.99 10*3/mm3      Lymphocytes, Absolute 1.39 10*3/mm3      Monocytes, Absolute 1.24 10*3/mm3      Eosinophils, Absolute 0.12 10*3/mm3      Basophils, Absolute 0.05 10*3/mm3      Immature Grans, Absolute 0.04 10*3/mm3      nRBC 0.0 /100 WBC         Imaging Results (Last 24 Hours)     Procedure Component Value Units Date/Time    CT Abdomen Pelvis Without  Contrast [[id number]] Collected: 05/03/22 1841     Updated: 05/03/22 1932    Narrative:      CT ABDOMEN PELVIS WITHOUT IV CONTRAST    COMPARISONS: None.    ADDITIONAL PERTINENT HISTORY: Abdominal pain.    TECHNIQUE: Multiple axial images are obtained from the lung bases  through the lesser trochanters without IV contrast.  One of the  following dose optimization techniques was utilized in the  performance of this exam: Automated exposure control; adjustment  of the mA and/or kV according to the patient's size; or use of an  iterative  reconstruction technique.  Specific details can be  referenced in the facility's radiology CT exam operational  policy.    FINDINGS:     Lung bases: Minimal bibasilar atelectatic change..    Free air and free fluid: None.    Liver: Negative for a noncontrasted examination..    Spleen: Negative for a noncontrasted examination.    Adrenal glands: Negative.     Kidneys, ureters and urinary bladder: Moderate right-sided  hydronephrosis with a 3 mm obstructing calculus at the right  ureterovesicular junction. Mild left-sided hydroureteronephrosis  with a 4 mm obstructing calculus at the level of the left  ureterovesicular junction. Nonspecific stranding about both  kidneys consistent with underlying atrophy. Small simple  appearing cyst involving the midportion of the left kidney.    Pancreas: Grossly negative.    Gallbladder: Cholelithiasis without evidence of acute  cholecystitis..     Bowel and mesentery: Diverticulosis without evidence of  diverticulitis involving the descending colon and the sigmoid  colon. No bowel obstruction. Normal-appearing appendix in the  right lower quadrant..    Lymph node assessment: Negative    Abdominal pelvic vasculature: Atherosclerotic disease of the  abdominal aorta and its major branches..    Intrapelvic contents: Moderate enlargement of the prostate..    Surrounding soft tissues: Negative.    Osseous structures: Spondylitic change involving the  lumbar  spine..      Impression:      1. Bilateral obstructing ureterovesicular junction calculi with  moderate right-sided hydroureteronephrosis and mild left-sided  hydroureteronephrosis.  2. Diverticulosis without evidence of diverticulitis.    Electronically signed by:  Kody Stroud MD  5/3/2022 7:30 PM CDT  Workstation: [id number]    XR Chest 1 View [[id number]] Collected: 05/03/22 1742     Updated: 05/03/22 1821    Narrative:      INDICATION: abd pain    EXAMINATION/TECHNIQUE:   X-RAY - XR CHEST 1 VIEW    COMPARISON: Chest x-ray 12/31/2021  ____________________________________________    FINDINGS:      LINES/DEVICES: Pacemaker leads are stable in position.    LUNGS: Lung markings are prominent but unchanged. There is no  focal consolidation. There is no pleural effusion or  pneumothorax.    MEDIASTINUM AND CARDIOVASCULAR STRUCTURES: Cardiac silhouette not  enlarged. Central airways and mediastinal contour are  unremarkable.      BONES AND SOFT TISSUES: Unremarkable.        Impression:        No radiographic evidence of acute cardiopulmonary disease.    Electronically signed by:  Karri Estevez MD  5/3/2022 6:19 PM CDT  Workstation: [id number]            Assessment:    Active Hospital Problems    Diagnosis    • Acute bilateral obstructive uropathy          Plan:    1.  Abd pain  2.  MARV  3.  Obstructive uropathy  4.  CAD  5.  NIDDM2  6.  Hypothyroidism     - IVF  - Flomax  - Urology consult  - NPO after MN  - pain relief and antiemetics prn  - SSI  - home meds as appropriate and tolerated   - place SCDs - consider lovenox for DVT prophylaxis post procedure     I have utilized all available immediate resources to obtain, update, or review the patient's current medications.      I confirmed that the patient's Advance Care Plan is present, code status is documented, or surrogate decision maker is listed in the patient's medical record.      I discussed the patient's findings and my recommendations with: the patient, ER  provider     Trell Lopez MD

## 2022-05-04 NOTE — CONSULTS
[facility]   Consult Note    Patient Name: Darwin Fraga  : 1940  MRN: [id number]  Primary Care Physician:  Serg Dale MD  Referring Physician: Brian Ozuna MD  Date of admission: 5/3/2022    Inpatient Urology Consult  Consult performed by: Susu Dykes APRN  Consult ordered by: Trell Lopez MD        Subjective   Subjective     Reason for Consult/ Chief Complaint: bilateral obstructing ureter stones    History of Present Illness  Darwin Fraga is a 82 y.o. male consulted to Urology for bilateral ureter stones with obstruction reports abdominal pain worse right side the last 2 days with constipation and suprapubic discomfort, no hematuria or fever. He is currently NPO. Medical history includes hypothyroidism, UTI, anemia, BPH, HTN, uric acid stones, Type II DM, CHF, pacemaker 2019, MI, takes ASA and Plavix.     Review of Systems   Gastrointestinal: Positive for abdominal distention and constipation.   Genitourinary: Positive for flank pain.   All other systems reviewed and are negative.       Personal History     Past Medical History:   Diagnosis Date   • Acquired hypothyroidism    • Acute urinary tract infection    • Anemia    • Benign non-nodular prostatic hyperplasia with lower urinary tract symptoms    • Diverticular disease of colon    • Essential hypertension    • Gastrointestinal hemorrhage    • History of myocardial infarction    • Hyperlipidemia    • Impacted cerumen    • Low back pain     rad to left hip   • Macular degeneration    • Macular degeneration 2017    Dr. Trotter in [place], [place]   • Myocardial infarct (HCC)    • Otalgia    • Primary osteoarthritis involving multiple joints    • Special screening for malignant neoplasms, colon    • Type 2 diabetes mellitus without complication (HCC)    • Uric acid renal calculus    • Urinary incontinence        Past Surgical History:   Procedure Laterality Date   • CARDIAC CATHETERIZATION     • CARDIAC SURGERY       Pace maker insertion 8-32-19 Dr. Charles   • COLONOSCOPY     • ENDOSCOPY AND COLONOSCOPY  02/2015    GI bleed   • EYE SURGERY      Lasik   • OTHER SURGICAL HISTORY  10/06/2015    Remove Impacted Cerumen 81924 (1)      • PACEMAKER IMPLANTATION  2019    [facility]    • TONSILLECTOMY  10/06/2015       Family History: family history includes COPD in his father; Cancer in his father and mother; Diabetes in his maternal grandfather; Emphysema in his father; Uterine cancer in his mother. Otherwise pertinent FHx was reviewed and not pertinent to current issue.    Social History:  reports that he has never smoked. He has never used smokeless tobacco. He reports that he does not drink alcohol and does not use drugs.    Home Medications:   Calcium Polycarbophil, Contour Next EZ, Dapagliflozin Propanediol, Docusate Sodium, Lancets, PreserVision AREDS 2+Multi Vit, Probiotic Product, aspirin, benzonatate, carvedilol, clobetasol, clopidogrel, fluticasone, folic acid, glimepiride, glucose blood, glucose monitor, levothyroxine, nitroglycerin, pantoprazole, pravastatin, promethazine-dextromethorphan, sacubitril-valsartan, sitaGLIPtin-metFORMIN, spironolactone, and tamsulosin    Allergies:  Allergies   Allergen Reactions   • Contrast Dye Hives and GI Intolerance   • Iodine Hives     Hives and vomiting with contrast   • Tuberculin Other (See Comments)     Redness and swelling       Objective    Objective     Vitals:  Temp:  [97.7 °F (36.5 °C)-98.1 °F (36.7 °C)] 98.1 °F (36.7 °C)  Heart Rate:  [61-84] 64  Resp:  [16-20] 18  BP: (131-164)/(63-78) 161/75    Physical Exam  Constitutional:       General: He is not in acute distress.     Appearance: Normal appearance. He is not ill-appearing.   HENT:      Head: Normocephalic and atraumatic.      Right Ear: External ear normal.      Left Ear: External ear normal.      Nose: Nose normal.      Mouth/Throat:      Mouth: Mucous membranes are moist.   Eyes:      General: No scleral icterus.         Right eye: No discharge.         Left eye: No discharge.      Pupils: Pupils are equal, round, and reactive to light.   Cardiovascular:      Rate and Rhythm: Normal rate.      Pulses: Normal pulses.   Pulmonary:      Effort: Pulmonary effort is normal. No respiratory distress.   Abdominal:      Palpations: Abdomen is soft.      Tenderness: There is abdominal tenderness.   Musculoskeletal:         General: No swelling, tenderness, deformity or signs of injury. Normal range of motion.   Skin:     General: Skin is warm and dry.      Capillary Refill: Capillary refill takes less than 2 seconds.      Coloration: Skin is not pale.      Findings: No bruising, erythema or rash.   Neurological:      General: No focal deficit present.      Mental Status: He is alert and oriented to person, place, and time. Mental status is at baseline.   Psychiatric:         Mood and Affect: Mood normal.         Behavior: Behavior normal.         Thought Content: Thought content normal.         Result Review    Result Review:  I have personally reviewed the results from the time of this admission to 5/4/2022 13:48 CDT and agree with these findings:  [x]  Laboratory  [x]  Microbiology  [x]  Radiology  []  EKG/Telemetry   []  Cardiology/Vascular   []  Pathology  []  Old records  []  Other:  Most notable findings include:     Lab Results (last 48 hours)     Procedure Component Value Units Date/Time    POC Glucose Once [[id number]]  (Normal) Collected: 05/04/22 1117    Specimen: Blood Updated: 05/04/22 1131     Glucose 93 mg/dL      Comment: RN NotifiedOperator: [id number] CAMERON Hawley ID: [id number]       POC Glucose Once [[id number]]  (Normal) Collected: 05/04/22 0603    Specimen: Blood Updated: 05/04/22 0652     Glucose 85 mg/dL      Comment: RN NotifiedOperator: [id number] ARYA Lui ID: [id number]       Basic Metabolic Panel [[id number]]  (Abnormal) Collected: 05/04/22 0552    Specimen: Blood Updated: 05/04/22 0625      Glucose 100 mg/dL      BUN 23 mg/dL      Creatinine 2.16 mg/dL      Sodium 141 mmol/L      Potassium 4.0 mmol/L      Chloride 110 mmol/L      CO2 21.0 mmol/L      Calcium 8.5 mg/dL      BUN/Creatinine Ratio 10.6     Anion Gap 10.0 mmol/L      eGFR 30.2 mL/min/1.73      Comment: National Kidney Foundation and American Society of Nephrology (ASN) Task Force recommended calculation based on the Chronic Kidney Disease Epidemiology Collaboration (CKD-EPI) equation refit without adjustment for race.       Narrative:      GFR Normal >60  Chronic Kidney Disease <60  Kidney Failure <15      CBC Auto Differential [[id number]]  (Abnormal) Collected: 05/04/22 0552    Specimen: Blood Updated: 05/04/22 0612     WBC 7.50 10*3/mm3      RBC 4.49 10*6/mm3      Hemoglobin 12.5 g/dL      Hematocrit 37.9 %      MCV 84.4 fL      MCH 27.8 pg      MCHC 33.0 g/dL      RDW 14.3 %      RDW-SD 44.1 fl      MPV 11.3 fL      Platelets 170 10*3/mm3      Neutrophil % 58.6 %      Lymphocyte % 23.3 %      Monocyte % 15.1 %      Eosinophil % 2.3 %      Basophil % 0.4 %      Immature Grans % 0.3 %      Neutrophils, Absolute 4.40 10*3/mm3      Lymphocytes, Absolute 1.75 10*3/mm3      Monocytes, Absolute 1.13 10*3/mm3      Eosinophils, Absolute 0.17 10*3/mm3      Basophils, Absolute 0.03 10*3/mm3      Immature Grans, Absolute 0.02 10*3/mm3      nRBC 0.0 /100 WBC     COVID-19 and FLU A/B PCR - Swab, Nasopharynx [[id number]]  (Normal) Collected: 05/03/22 2105    Specimen: Swab from Nasopharynx Updated: 05/03/22 2132     COVID19 Not Detected     Influenza A PCR Not Detected     Influenza B PCR Not Detected    Narrative:      Fact sheet for providers: https://www.fda.gov/media/063653/download    Fact sheet for patients: https://www.fda.gov/media/459669/download    Test performed by PCR.    [place] Draw [[id number]] Collected: 05/03/22 1702    Specimen: Blood Updated: 05/03/22 1817    Narrative:      The following orders were created for panel order [place]  Draw.  Procedure                               Abnormality         Status                     ---------                               -----------         ------                     Green Top (Gel)[[id number]]                                  Final result               Lavender Top[[id number]]                                     Final result               Gold Top - SST[[id number]]                                   Final result               Light Blue Top[[id number]]                                   Final result                 Please view results for these tests on the individual orders.    Green Top (Gel) [[id number]] Collected: 05/03/22 1702    Specimen: Blood Updated: 05/03/22 1817     Extra Tube Hold for add-ons.     Comment: Auto resulted.       Gold Top - SST [[id number]] Collected: 05/03/22 1702    Specimen: Blood Updated: 05/03/22 1817     Extra Tube Hold for add-ons.     Comment: Auto resulted.       Lavender Top [[id number]] Collected: 05/03/22 1702    Specimen: Blood Updated: 05/03/22 1817     Extra Tube hold for add-on     Comment: Auto resulted       Light Blue Top [[id number]] Collected: 05/03/22 1702    Specimen: Blood Updated: 05/03/22 1817     Extra Tube hold for add-on     Comment: Auto resulted       Urinalysis With Microscopic If Indicated (No Culture) - Urine, Clean Catch [[id number]]  (Normal) Collected: 05/03/22 1711    Specimen: Urine, Clean Catch Updated: 05/03/22 1728     Color, UA Yellow     Appearance, UA Clear     pH, UA 7.0     Specific Gravity, UA 1.010     Glucose, UA Negative     Ketones, UA Negative     Bilirubin, UA Negative     Blood, UA Negative     Protein, UA Negative     Leuk Esterase, UA Negative     Nitrite, UA Negative     Urobilinogen, UA 0.2 E.U./dL    Narrative:      Urine microscopic not indicated.    Comprehensive Metabolic Panel [[id number]]  (Abnormal) Collected: 05/03/22 1702    Specimen: Blood Updated: 05/03/22 1725     Glucose 144 mg/dL      BUN 27 mg/dL      Creatinine  2.40 mg/dL      Sodium 139 mmol/L      Potassium 4.4 mmol/L      Chloride 103 mmol/L      CO2 22.0 mmol/L      Calcium 9.3 mg/dL      Total Protein 7.0 g/dL      Albumin 4.20 g/dL      ALT (SGPT) 14 U/L      AST (SGOT) 17 U/L      Alkaline Phosphatase 67 U/L      Total Bilirubin 0.9 mg/dL      Globulin 2.8 gm/dL      A/G Ratio 1.5 g/dL      BUN/Creatinine Ratio 11.3     Anion Gap 14.0 mmol/L      eGFR 26.6 mL/min/1.73      Comment: National Kidney Foundation and American Society of Nephrology (ASN) Task Force recommended calculation based on the Chronic Kidney Disease Epidemiology Collaboration (CKD-EPI) equation refit without adjustment for race.       Narrative:      GFR Normal >60  Chronic Kidney Disease <60  Kidney Failure <15      Lipase [[id number]]  (Normal) Collected: 05/03/22 1702    Specimen: Blood Updated: 05/03/22 1725     Lipase 30 U/L     CBC & Differential [[id number]]  (Abnormal) Collected: 05/03/22 1702    Specimen: Blood Updated: 05/03/22 1706    Narrative:      The following orders were created for panel order CBC & Differential.  Procedure                               Abnormality         Status                     ---------                               -----------         ------                     CBC Auto Differential[[id number]]        Abnormal            Final result                 Please view results for these tests on the individual orders.    CBC Auto Differential [[id number]]  (Abnormal) Collected: 05/03/22 1702    Specimen: Blood Updated: 05/03/22 1706     WBC 10.83 10*3/mm3      RBC 5.30 10*6/mm3      Hemoglobin 14.6 g/dL      Hematocrit 44.8 %      MCV 84.5 fL      MCH 27.5 pg      MCHC 32.6 g/dL      RDW 14.5 %      RDW-SD 44.0 fl      MPV 11.2 fL      Platelets 200 10*3/mm3      Neutrophil % 73.8 %      Lymphocyte % 12.8 %      Monocyte % 11.4 %      Eosinophil % 1.1 %      Basophil % 0.5 %      Immature Grans % 0.4 %      Neutrophils, Absolute 7.99 10*3/mm3      Lymphocytes, Absolute  1.39 10*3/mm3      Monocytes, Absolute 1.24 10*3/mm3      Eosinophils, Absolute 0.12 10*3/mm3      Basophils, Absolute 0.05 10*3/mm3      Immature Grans, Absolute 0.04 10*3/mm3      nRBC 0.0 /100 WBC         Imaging Results (Last 48 Hours)     Procedure Component Value Units Date/Time    CT Abdomen Pelvis Without Contrast [[id number]] Collected: 05/03/22 1841     Updated: 05/03/22 1932    Narrative:      CT ABDOMEN PELVIS WITHOUT IV CONTRAST    COMPARISONS: None.    ADDITIONAL PERTINENT HISTORY: Abdominal pain.    TECHNIQUE: Multiple axial images are obtained from the lung bases  through the lesser trochanters without IV contrast.  One of the  following dose optimization techniques was utilized in the  performance of this exam: Automated exposure control; adjustment  of the mA and/or kV according to the patient's size; or use of an  iterative  reconstruction technique.  Specific details can be  referenced in the facility's radiology CT exam operational  policy.    FINDINGS:     Lung bases: Minimal bibasilar atelectatic change..    Free air and free fluid: None.    Liver: Negative for a noncontrasted examination..    Spleen: Negative for a noncontrasted examination.    Adrenal glands: Negative.     Kidneys, ureters and urinary bladder: Moderate right-sided  hydronephrosis with a 3 mm obstructing calculus at the right  ureterovesicular junction. Mild left-sided hydroureteronephrosis  with a 4 mm obstructing calculus at the level of the left  ureterovesicular junction. Nonspecific stranding about both  kidneys consistent with underlying atrophy. Small simple  appearing cyst involving the midportion of the left kidney.    Pancreas: Grossly negative.    Gallbladder: Cholelithiasis without evidence of acute  cholecystitis..     Bowel and mesentery: Diverticulosis without evidence of  diverticulitis involving the descending colon and the sigmoid  colon. No bowel obstruction. Normal-appearing appendix in the  right lower  quadrant..    Lymph node assessment: Negative    Abdominal pelvic vasculature: Atherosclerotic disease of the  abdominal aorta and its major branches..    Intrapelvic contents: Moderate enlargement of the prostate..    Surrounding soft tissues: Negative.    Osseous structures: Spondylitic change involving the lumbar  spine..      Impression:      1. Bilateral obstructing ureterovesicular junction calculi with  moderate right-sided hydroureteronephrosis and mild left-sided  hydroureteronephrosis.  2. Diverticulosis without evidence of diverticulitis.    Electronically signed by:  Kody Stroud MD  5/3/2022 7:30 PM CDT  Workstation: [id number]    XR Chest 1 View [[id number]] Collected: 05/03/22 1742     Updated: 05/03/22 1821    Narrative:      INDICATION: abd pain    EXAMINATION/TECHNIQUE:   X-RAY - XR CHEST 1 VIEW    COMPARISON: Chest x-ray 12/31/2021  ____________________________________________    FINDINGS:      LINES/DEVICES: Pacemaker leads are stable in position.    LUNGS: Lung markings are prominent but unchanged. There is no  focal consolidation. There is no pleural effusion or  pneumothorax.    MEDIASTINUM AND CARDIOVASCULAR STRUCTURES: Cardiac silhouette not  enlarged. Central airways and mediastinal contour are  unremarkable.      BONES AND SOFT TISSUES: Unremarkable.        Impression:        No radiographic evidence of acute cardiopulmonary disease.    Electronically signed by:  Karri Estevez MD  5/3/2022 6:19 PM CDT  Workstation: [id number]          Assessment/Plan   Assessment / Plan     Brief Patient Summary:  Darwin Fraga is a 82 y.o. male who presents with bilateral obstructive UVJ stone with moderate right side hydroureteronephrosis and mild left side hydroureteronephrosis.     Active Hospital Problems:  Active Hospital Problems    Diagnosis    • **Kidney stone      Added automatically from request for surgery [id number]     • Acute bilateral obstructive uropathy      Plan:   NPO for CYSTOSCOPY BILATERAL  URETEROSCOPY RETROGRADE PYELOGRAM HOLMIUM LASER STENT INSERTION with Dr. Syeda Dykes, [place]

## 2022-05-04 NOTE — PROGRESS NOTES
LOS: 0 days     Patient Care Team:  Serg Dale MD as PCP - General (Family Medicine)  Ashu Escamilla (Ophthalmology)  Birdie Spencer MD (Pulmonary Disease)      Subjective     Bilateral ureteral calculi    Objective       Vital Signs  Temp:  [96.9 °F (36.1 °C)-98.1 °F (36.7 °C)] 96.9 °F (36.1 °C)  Heart Rate:  [61-88] 84  Resp:  [16-20] 20  BP: (131-183)/(63-97) 183/97    Physical Exam:        General Appearance:   No acute distress     Respiratory:    UNLABORED RESPIRATIONS.     Abdomen:     SOFT.       Genitourinary:  Urine clear     Rectal:     DEFERRED       Results Review:       Imaging Results (Last 24 Hours)     Procedure Component Value Units Date/Time    CT Abdomen Pelvis Without Contrast [[id number]] Collected: 05/03/22 1841     Updated: 05/03/22 1932    Narrative:      CT ABDOMEN PELVIS WITHOUT IV CONTRAST    COMPARISONS: None.    ADDITIONAL PERTINENT HISTORY: Abdominal pain.    TECHNIQUE: Multiple axial images are obtained from the lung bases  through the lesser trochanters without IV contrast.  One of the  following dose optimization techniques was utilized in the  performance of this exam: Automated exposure control; adjustment  of the mA and/or kV according to the patient's size; or use of an  iterative  reconstruction technique.  Specific details can be  referenced in the facility's radiology CT exam operational  policy.    FINDINGS:     Lung bases: Minimal bibasilar atelectatic change..    Free air and free fluid: None.    Liver: Negative for a noncontrasted examination..    Spleen: Negative for a noncontrasted examination.    Adrenal glands: Negative.     Kidneys, ureters and urinary bladder: Moderate right-sided  hydronephrosis with a 3 mm obstructing calculus at the right  ureterovesicular junction. Mild left-sided hydroureteronephrosis  with a 4 mm obstructing calculus at the level of the left  ureterovesicular junction. Nonspecific stranding about both  kidneys consistent with  underlying atrophy. Small simple  appearing cyst involving the midportion of the left kidney.    Pancreas: Grossly negative.    Gallbladder: Cholelithiasis without evidence of acute  cholecystitis..     Bowel and mesentery: Diverticulosis without evidence of  diverticulitis involving the descending colon and the sigmoid  colon. No bowel obstruction. Normal-appearing appendix in the  right lower quadrant..    Lymph node assessment: Negative    Abdominal pelvic vasculature: Atherosclerotic disease of the  abdominal aorta and its major branches..    Intrapelvic contents: Moderate enlargement of the prostate..    Surrounding soft tissues: Negative.    Osseous structures: Spondylitic change involving the lumbar  spine..      Impression:      1. Bilateral obstructing ureterovesicular junction calculi with  moderate right-sided hydroureteronephrosis and mild left-sided  hydroureteronephrosis.  2. Diverticulosis without evidence of diverticulitis.    Electronically signed by:  Kody Stroud MD  5/3/2022 7:30 PM CDT  Workstation: [id number]        Lab Results (last 24 hours)     Procedure Component Value Units Date/Time    POC Glucose Once [[id number]]  (Normal) Collected: 05/04/22 1652    Specimen: Blood Updated: 05/04/22 1705     Glucose 73 mg/dL      Comment: RN NotifiedOperator: [id number] CAMERON OLIVIAMeter ID: [id number]       POC Glucose Once [[id number]]  (Normal) Collected: 05/04/22 1117    Specimen: Blood Updated: 05/04/22 1131     Glucose 93 mg/dL      Comment: RN NotifiedOperator: [id number] BARNES OLIVIAMeter ID: [id number]       POC Glucose Once [[id number]]  (Normal) Collected: 05/04/22 0603    Specimen: Blood Updated: 05/04/22 0652     Glucose 85 mg/dL      Comment: RN NotifiedOperator: [id number] ARYA Lui ID: [id number]       Basic Metabolic Panel [[id number]]  (Abnormal) Collected: 05/04/22 0552    Specimen: Blood Updated: 05/04/22 0625     Glucose 100 mg/dL      BUN 23 mg/dL      Creatinine 2.16  mg/dL      Sodium 141 mmol/L      Potassium 4.0 mmol/L      Chloride 110 mmol/L      CO2 21.0 mmol/L      Calcium 8.5 mg/dL      BUN/Creatinine Ratio 10.6     Anion Gap 10.0 mmol/L      eGFR 30.2 mL/min/1.73      Comment: National Kidney Foundation and American Society of Nephrology (ASN) Task Force recommended calculation based on the Chronic Kidney Disease Epidemiology Collaboration (CKD-EPI) equation refit without adjustment for race.       Narrative:      GFR Normal >60  Chronic Kidney Disease <60  Kidney Failure <15      CBC Auto Differential [[id number]]  (Abnormal) Collected: 05/04/22 0552    Specimen: Blood Updated: 05/04/22 0612     WBC 7.50 10*3/mm3      RBC 4.49 10*6/mm3      Hemoglobin 12.5 g/dL      Hematocrit 37.9 %      MCV 84.4 fL      MCH 27.8 pg      MCHC 33.0 g/dL      RDW 14.3 %      RDW-SD 44.1 fl      MPV 11.3 fL      Platelets 170 10*3/mm3      Neutrophil % 58.6 %      Lymphocyte % 23.3 %      Monocyte % 15.1 %      Eosinophil % 2.3 %      Basophil % 0.4 %      Immature Grans % 0.3 %      Neutrophils, Absolute 4.40 10*3/mm3      Lymphocytes, Absolute 1.75 10*3/mm3      Monocytes, Absolute 1.13 10*3/mm3      Eosinophils, Absolute 0.17 10*3/mm3      Basophils, Absolute 0.03 10*3/mm3      Immature Grans, Absolute 0.02 10*3/mm3      nRBC 0.0 /100 WBC     COVID-19 and FLU A/B PCR - Swab, Nasopharynx [[id number]]  (Normal) Collected: 05/03/22 2105    Specimen: Swab from Nasopharynx Updated: 05/03/22 2132     COVID19 Not Detected     Influenza A PCR Not Detected     Influenza B PCR Not Detected    Narrative:      Fact sheet for providers: https://www.fda.gov/media/891110/download    Fact sheet for patients: https://www.fda.gov/media/598629/download    Test performed by PCR.            I reviewed the patient's new clinical results.  I reviewed the patient's new imaging results and agree with the interpretation.  I reviewed the patient's other test results and agree with the  interpretation        Assessment:    Bilateral ureteral calculi hydronephrosis    Plan:     Cystoscopy bilateral retrogrades bilateral ureteroscopy laser lithotripsy J stent placement risk benefits of been discussed      Peyman Landa MD  05/04/22  18:52 CDT

## 2022-05-04 NOTE — PROGRESS NOTES
[facility] Medicine Services  INPATIENT PROGRESS NOTE    Length of Stay: 0  Date of Admission: 5/3/2022  Primary Care Physician: Serg Dale MD    Subjective   Chief Complaint: abdominal pain  HPI:  82 year old male who presented with 3 days of abdominal pain.  He was found to have bilateral obstructing ureterovesicalar junction calculi.  Urology is consulted. He notes pain is tolerable this morning.     Review of Systems   Constitutional: Negative for chills and fever.   Respiratory: Negative for shortness of breath.    Cardiovascular: Negative for chest pain.   Gastrointestinal: Positive for abdominal pain. Negative for nausea and vomiting.        All pertinent negatives and positives are as above. All other systems have been reviewed and are negative unless otherwise stated.     Objective    Temp:  [97.7 °F (36.5 °C)-98.1 °F (36.7 °C)] 98.1 °F (36.7 °C)  Heart Rate:  [61-84] 64  Resp:  [16-20] 18  BP: (131-164)/(63-78) 161/75    Physical Exam  Vitals reviewed.   Constitutional:       General: He is not in acute distress.     Appearance: Normal appearance.   HENT:      Head: Normocephalic and atraumatic.   Cardiovascular:      Rate and Rhythm: Normal rate and regular rhythm.   Pulmonary:      Effort: Pulmonary effort is normal.      Breath sounds: Normal breath sounds.   Abdominal:      General: There is no distension.      Palpations: Abdomen is soft.      Tenderness: There is no abdominal tenderness.   Musculoskeletal:         General: No swelling or deformity.   Skin:     General: Skin is warm and dry.   Neurological:      General: No focal deficit present.      Mental Status: He is alert and oriented to person, place, and time.         Results Review:  I have reviewed the labs, radiology results, and diagnostic studies.    Laboratory Data:   Results from last 7 days   Lab Units 05/04/22  0552 05/03/22  1702   SODIUM mmol/L 141 139   POTASSIUM mmol/L  4.0 4.4   CHLORIDE mmol/L 110* 103   CO2 mmol/L 21.0* 22.0   BUN mg/dL 23 27*   CREATININE mg/dL 2.16* 2.40*   GLUCOSE mg/dL 100* 144*   CALCIUM mg/dL 8.5* 9.3   BILIRUBIN mg/dL  --  0.9   ALK PHOS U/L  --  67   ALT (SGPT) U/L  --  14   AST (SGOT) U/L  --  17   ANION GAP mmol/L 10.0 14.0     Estimated Creatinine Clearance: 33.3 mL/min (A) (by C-G formula based on SCr of 2.16 mg/dL (H)).          Results from last 7 days   Lab Units 05/04/22  0552 05/03/22  1702   WBC 10*3/mm3 7.50 10.83*   HEMOGLOBIN g/dL 12.5* 14.6   HEMATOCRIT % 37.9 44.8   PLATELETS 10*3/mm3 170 200           Culture Data:   No results found for: BLOODCX  No results found for: URINECX  No results found for: RESPCX  No results found for: WOUNDCX  No results found for: STOOLCX  No components found for: BODYFLD    Radiology Data:   Imaging Results (Last 24 Hours)     Procedure Component Value Units Date/Time    CT Abdomen Pelvis Without Contrast [[id number]] Collected: 05/03/22 1841     Updated: 05/03/22 1932    Narrative:      CT ABDOMEN PELVIS WITHOUT IV CONTRAST    COMPARISONS: None.    ADDITIONAL PERTINENT HISTORY: Abdominal pain.    TECHNIQUE: Multiple axial images are obtained from the lung bases  through the lesser trochanters without IV contrast.  One of the  following dose optimization techniques was utilized in the  performance of this exam: Automated exposure control; adjustment  of the mA and/or kV according to the patient's size; or use of an  iterative  reconstruction technique.  Specific details can be  referenced in the facility's radiology CT exam operational  policy.    FINDINGS:     Lung bases: Minimal bibasilar atelectatic change..    Free air and free fluid: None.    Liver: Negative for a noncontrasted examination..    Spleen: Negative for a noncontrasted examination.    Adrenal glands: Negative.     Kidneys, ureters and urinary bladder: Moderate right-sided  hydronephrosis with a 3 mm obstructing calculus at the  right  ureterovesicular junction. Mild left-sided hydroureteronephrosis  with a 4 mm obstructing calculus at the level of the left  ureterovesicular junction. Nonspecific stranding about both  kidneys consistent with underlying atrophy. Small simple  appearing cyst involving the midportion of the left kidney.    Pancreas: Grossly negative.    Gallbladder: Cholelithiasis without evidence of acute  cholecystitis..     Bowel and mesentery: Diverticulosis without evidence of  diverticulitis involving the descending colon and the sigmoid  colon. No bowel obstruction. Normal-appearing appendix in the  right lower quadrant..    Lymph node assessment: Negative    Abdominal pelvic vasculature: Atherosclerotic disease of the  abdominal aorta and its major branches..    Intrapelvic contents: Moderate enlargement of the prostate..    Surrounding soft tissues: Negative.    Osseous structures: Spondylitic change involving the lumbar  spine..      Impression:      1. Bilateral obstructing ureterovesicular junction calculi with  moderate right-sided hydroureteronephrosis and mild left-sided  hydroureteronephrosis.  2. Diverticulosis without evidence of diverticulitis.    Electronically signed by:  Kody Stroud MD  5/3/2022 7:30 PM CDT  Workstation: [id number]    XR Chest 1 View [[id number]] Collected: 05/03/22 1742     Updated: 05/03/22 1821    Narrative:      INDICATION: abd pain    EXAMINATION/TECHNIQUE:   X-RAY - XR CHEST 1 VIEW    COMPARISON: Chest x-ray 12/31/2021  ____________________________________________    FINDINGS:      LINES/DEVICES: Pacemaker leads are stable in position.    LUNGS: Lung markings are prominent but unchanged. There is no  focal consolidation. There is no pleural effusion or  pneumothorax.    MEDIASTINUM AND CARDIOVASCULAR STRUCTURES: Cardiac silhouette not  enlarged. Central airways and mediastinal contour are  unremarkable.      BONES AND SOFT TISSUES: Unremarkable.        Impression:        No  radiographic evidence of acute cardiopulmonary disease.    Electronically signed by:  Karri Estevez MD  5/3/2022 6:19 PM CDT  Workstation: [id number]          I have reviewed the patient's current medications.     Assessment/Plan     Active Hospital Problems    Diagnosis    • **Kidney stone      Added automatically from request for surgery [id number]     • Acute bilateral obstructive uropathy    MARV  DMII  CAD  Chronic HFrEF    Plan:    NPO  Urology consultation, plan for cystoscopy  IV fluid: NS 75 ml/hr  Flomax  Pain control  Antiemetics  Glucose control: SSI  Continue Aspirin, Plavix, Statin, Coreg, Entresto  Hold diuretics  VTE PPx: SCD      I confirmed that the patient's Advance Care Plan is present, code status is documented, or surrogate decision maker is listed in the patient's medical record.     The patient was evaluated during the global COVID-19 pandemic, and the diagnosis was suspected/considered upon their initial presentation.  Evaluation, treatment, and testing were consistent with current guidelines for patients who present with complaints or symptoms that may be related to COVID-19.          This document has been electronically signed by BOZENA Perez on May 4, 2022 09:43 CDT

## 2022-05-04 NOTE — ANESTHESIA PREPROCEDURE EVALUATION
Anesthesia Evaluation     Patient summary reviewed and Nursing notes reviewed   no history of anesthetic complications:  NPO Solid Status: > 8 hours  NPO Liquid Status: > 8 hours           Airway   Mallampati: II  TM distance: >3 FB  Neck ROM: full  possible difficult intubation  Dental    (+) poor dentation    Pulmonary - negative pulmonary ROS    breath sounds clear to auscultation  (+) decreased breath sounds,   (-) COPD, asthma, sleep apnea, not a smoker    ROS comment: LUNGS: Lung markings are prominent but unchanged. There is no  focal consolidation. There is no pleural effusion or  pneumothorax.     MEDIASTINUM AND CARDIOVASCULAR STRUCTURES: Cardiac silhouette not  enlarged. Central airways and mediastinal contour are  unremarkable.        BONES AND SOFT TISSUES: Unremarkable.        IMPRESSION:     No radiographic evidence of acute cardiopulmonary disease.     Electronically signed by:  Karri Estevez MD  5/3/2022 6:19 PM CDT  Cardiovascular - normal exam  Exercise tolerance: good (4-7 METS)    ECG reviewed  PT is on anticoagulation therapy  Patient on routine beta blocker  Rhythm: regular  Rate: normal    (+) pacemaker (2019) pacemaker, hypertension well controlled, past MI (2019)  >12 months, CAD, dysrhythmias (History of complete AV block/pacemaker), hyperlipidemia,   (-) angina, murmur, carotid bruits, cardiac stents    ROS comment: Normal sinus rhythm  Left bundle branch block  Abnormal ECG  When compared with ECG of 04-DEC-2021 05:42,  Questionable change in QRS axis  T wave inversion now evident in Inferior leads    States he sees a cardiologist at [facility]    Neuro/Psych- negative ROS  (-) seizures, TIA, CVA, headaches, numbness, psychiatric history  GI/Hepatic/Renal/Endo    (+)  GERD well controlled,  renal disease (Creatinine 2.16) stones, diabetes mellitus type 2 well controlled, thyroid problem hypothyroidism  (-) hepatitis, liver disease    Musculoskeletal (-) negative ROS    Abdominal   (+) obese,     Substance History - negative use  (-) alcohol use, drug use     OB/GYN negative ob/gyn ROS         Other - negative ROS       (-) history of cancer                Anesthesia Plan    ASA 3 - emergent     general     intravenous induction     Anesthetic plan, all risks, benefits, and alternatives have been provided, discussed and informed consent has been obtained with: patient and spouse/significant other.        CODE STATUS:    Level Of Support Discussed With: Patient  Code Status (Patient has no pulse and is not breathing): CPR (Attempt to Resuscitate)  Medical Interventions (Patient has pulse or is breathing): Full Support

## 2022-05-05 LAB
ANION GAP SERPL CALCULATED.3IONS-SCNC: 13 MMOL/L (ref 5–15)
BASOPHILS # BLD AUTO: 0.04 10*3/MM3 (ref 0–0.2)
BASOPHILS NFR BLD AUTO: 0.5 % (ref 0–1.5)
BUN SERPL-MCNC: 17 MG/DL (ref 8–23)
BUN/CREAT SERPL: 12.4 (ref 7–25)
CALCIUM SPEC-SCNC: 9.3 MG/DL (ref 8.6–10.5)
CHLORIDE SERPL-SCNC: 104 MMOL/L (ref 98–107)
CO2 SERPL-SCNC: 19 MMOL/L (ref 22–29)
CREAT SERPL-MCNC: 1.37 MG/DL (ref 0.76–1.27)
DEPRECATED RDW RBC AUTO: 44.9 FL (ref 37–54)
EGFRCR SERPLBLD CKD-EPI 2021: 52.1 ML/MIN/1.73
EOSINOPHIL # BLD AUTO: 0.02 10*3/MM3 (ref 0–0.4)
EOSINOPHIL NFR BLD AUTO: 0.2 % (ref 0.3–6.2)
ERYTHROCYTE [DISTWIDTH] IN BLOOD BY AUTOMATED COUNT: 14.4 % (ref 12.3–15.4)
GLUCOSE BLDC GLUCOMTR-MCNC: 140 MG/DL (ref 70–130)
GLUCOSE BLDC GLUCOMTR-MCNC: 168 MG/DL (ref 70–130)
GLUCOSE BLDC GLUCOMTR-MCNC: 198 MG/DL (ref 70–130)
GLUCOSE BLDC GLUCOMTR-MCNC: 200 MG/DL (ref 70–130)
GLUCOSE BLDC GLUCOMTR-MCNC: 234 MG/DL (ref 70–130)
GLUCOSE SERPL-MCNC: 243 MG/DL (ref 65–99)
HCT VFR BLD AUTO: 42.7 % (ref 37.5–51)
HGB BLD-MCNC: 14 G/DL (ref 13–17.7)
IMM GRANULOCYTES # BLD AUTO: 0.05 10*3/MM3 (ref 0–0.05)
IMM GRANULOCYTES NFR BLD AUTO: 0.6 % (ref 0–0.5)
LYMPHOCYTES # BLD AUTO: 1.12 10*3/MM3 (ref 0.7–3.1)
LYMPHOCYTES NFR BLD AUTO: 12.9 % (ref 19.6–45.3)
MCH RBC QN AUTO: 28 PG (ref 26.6–33)
MCHC RBC AUTO-ENTMCNC: 32.8 G/DL (ref 31.5–35.7)
MCV RBC AUTO: 85.4 FL (ref 79–97)
MONOCYTES # BLD AUTO: 1.1 10*3/MM3 (ref 0.1–0.9)
MONOCYTES NFR BLD AUTO: 12.7 % (ref 5–12)
NEUTROPHILS NFR BLD AUTO: 6.36 10*3/MM3 (ref 1.7–7)
NEUTROPHILS NFR BLD AUTO: 73.1 % (ref 42.7–76)
NRBC BLD AUTO-RTO: 0 /100 WBC (ref 0–0.2)
PLATELET # BLD AUTO: 190 10*3/MM3 (ref 140–450)
PMV BLD AUTO: 11.4 FL (ref 6–12)
POTASSIUM SERPL-SCNC: 3.9 MMOL/L (ref 3.5–5.2)
RBC # BLD AUTO: 5 10*6/MM3 (ref 4.14–5.8)
SODIUM SERPL-SCNC: 136 MMOL/L (ref 136–145)
WBC NRBC COR # BLD: 8.69 10*3/MM3 (ref 3.4–10.8)

## 2022-05-05 PROCEDURE — 63710000001 CARVEDILOL 6.25 MG TABLET: Performed by: UROLOGY

## 2022-05-05 PROCEDURE — 63710000001 CLOPIDOGREL 75 MG TABLET: Performed by: UROLOGY

## 2022-05-05 PROCEDURE — 63710000001 PANTOPRAZOLE 40 MG TABLET DELAYED-RELEASE: Performed by: UROLOGY

## 2022-05-05 PROCEDURE — A9270 NON-COVERED ITEM OR SERVICE: HCPCS | Performed by: UROLOGY

## 2022-05-05 PROCEDURE — 63710000001 DOCUSATE SODIUM 100 MG CAPSULE: Performed by: UROLOGY

## 2022-05-05 PROCEDURE — 80048 BASIC METABOLIC PNL TOTAL CA: CPT | Performed by: UROLOGY

## 2022-05-05 PROCEDURE — 85025 COMPLETE CBC W/AUTO DIFF WBC: CPT | Performed by: UROLOGY

## 2022-05-05 PROCEDURE — 63710000001 SACUBITRIL-VALSARTAN 24-26 MG TABLET: Performed by: UROLOGY

## 2022-05-05 PROCEDURE — 63710000001 FOLIC ACID 1 MG TABLET: Performed by: UROLOGY

## 2022-05-05 PROCEDURE — G0378 HOSPITAL OBSERVATION PER HR: HCPCS

## 2022-05-05 PROCEDURE — 63710000001 TAMSULOSIN 0.4 MG CAPSULE: Performed by: UROLOGY

## 2022-05-05 PROCEDURE — 63710000001 PSYLLIUM 28 % PACK: Performed by: UROLOGY

## 2022-05-05 PROCEDURE — 25010000002 MORPHINE PER 10 MG: Performed by: UROLOGY

## 2022-05-05 PROCEDURE — A9270 NON-COVERED ITEM OR SERVICE: HCPCS

## 2022-05-05 PROCEDURE — 63710000001 LEVOTHYROXINE 25 MCG TABLET: Performed by: UROLOGY

## 2022-05-05 PROCEDURE — 63710000001 PRAVASTATIN 40 MG TABLET: Performed by: UROLOGY

## 2022-05-05 PROCEDURE — 82962 GLUCOSE BLOOD TEST: CPT

## 2022-05-05 PROCEDURE — 63710000001 INSULIN ASPART PER 5 UNITS: Performed by: UROLOGY

## 2022-05-05 PROCEDURE — 63710000001 ASPIRIN 81 MG TABLET DELAYED-RELEASE: Performed by: UROLOGY

## 2022-05-05 PROCEDURE — 63710000001 SENNA 8.6 MG TABLET

## 2022-05-05 RX ORDER — POLYETHYLENE GLYCOL 3350 17 G/17G
17 POWDER, FOR SOLUTION ORAL DAILY
Status: DISCONTINUED | OUTPATIENT
Start: 2022-05-06 | End: 2022-05-06 | Stop reason: HOSPADM

## 2022-05-05 RX ORDER — SENNA PLUS 8.6 MG/1
2 TABLET ORAL NIGHTLY
Status: DISCONTINUED | OUTPATIENT
Start: 2022-05-05 | End: 2022-05-06 | Stop reason: HOSPADM

## 2022-05-05 RX ADMIN — Medication 10 ML: at 20:32

## 2022-05-05 RX ADMIN — INSULIN ASPART 3 UNITS: 100 INJECTION, SOLUTION INTRAVENOUS; SUBCUTANEOUS at 17:16

## 2022-05-05 RX ADMIN — LEVOTHYROXINE SODIUM 25 MCG: 25 TABLET ORAL at 08:07

## 2022-05-05 RX ADMIN — Medication 1 PACKET: at 08:07

## 2022-05-05 RX ADMIN — SENNOSIDES 2 TABLET: 8.6 TABLET, FILM COATED ORAL at 21:57

## 2022-05-05 RX ADMIN — SACUBITRIL AND VALSARTAN 1 TABLET: 24; 26 TABLET, FILM COATED ORAL at 20:32

## 2022-05-05 RX ADMIN — Medication 10 ML: at 08:07

## 2022-05-05 RX ADMIN — PRAVASTATIN SODIUM 40 MG: 40 TABLET ORAL at 20:32

## 2022-05-05 RX ADMIN — DOCUSATE SODIUM 100 MG: 100 CAPSULE, LIQUID FILLED ORAL at 08:07

## 2022-05-05 RX ADMIN — TAMSULOSIN HYDROCHLORIDE 0.4 MG: 0.4 CAPSULE ORAL at 08:07

## 2022-05-05 RX ADMIN — INSULIN ASPART 2 UNITS: 100 INJECTION, SOLUTION INTRAVENOUS; SUBCUTANEOUS at 08:08

## 2022-05-05 RX ADMIN — FOLIC ACID 1000 MCG: 1 TABLET ORAL at 08:07

## 2022-05-05 RX ADMIN — PANTOPRAZOLE SODIUM 40 MG: 40 TABLET, DELAYED RELEASE ORAL at 08:07

## 2022-05-05 RX ADMIN — MORPHINE SULFATE 2 MG: 2 INJECTION, SOLUTION INTRAMUSCULAR; INTRAVENOUS at 05:14

## 2022-05-05 RX ADMIN — DEXTROSE AND SODIUM CHLORIDE 100 ML/HR: 5; 450 INJECTION, SOLUTION INTRAVENOUS at 08:16

## 2022-05-05 RX ADMIN — CARVEDILOL 6.25 MG: 6.25 TABLET, FILM COATED ORAL at 08:07

## 2022-05-05 RX ADMIN — CLOPIDOGREL BISULFATE 75 MG: 75 TABLET ORAL at 08:07

## 2022-05-05 RX ADMIN — CARVEDILOL 6.25 MG: 6.25 TABLET, FILM COATED ORAL at 17:16

## 2022-05-05 RX ADMIN — SACUBITRIL AND VALSARTAN 1 TABLET: 24; 26 TABLET, FILM COATED ORAL at 08:07

## 2022-05-05 RX ADMIN — ASPIRIN 81 MG: 81 TABLET, FILM COATED ORAL at 08:07

## 2022-05-05 RX ADMIN — INSULIN ASPART 3 UNITS: 100 INJECTION, SOLUTION INTRAVENOUS; SUBCUTANEOUS at 11:16

## 2022-05-05 NOTE — ANESTHESIA POSTPROCEDURE EVALUATION
Patient: Darwin Fraga    Procedure Summary     Date: 05/04/22 Room / Location: [facility] OR 02 / [facility] OR    Anesthesia Start: 1929 Anesthesia Stop: 2000    Procedure: CYSTOSCOPY URETEROSCOPY RETROGRADE PYELOGRAM STENT INSERTION (Bilateral ) Diagnosis:       Kidney stone      (Kidney stone [N20.0])    Surgeons: Peyman Landa MD Provider: Hardy James MD    Anesthesia Type: general ASA Status: 3 - Emergent          Anesthesia Type: general    Vitals  No vitals data found for the desired time range.          Post Anesthesia Care and Evaluation    Patient location during evaluation: PACU  Patient participation: waiting for patient participation  Level of consciousness: sleepy but conscious and responsive to verbal stimuli  Pain score: 0  Pain management: adequate  Airway patency: patent  Anesthetic complications: No anesthetic complications  PONV Status: none  Cardiovascular status: acceptable  Respiratory status: acceptable  Hydration status: acceptable    Comments: ---------------------------               05/04/22                      1844         ---------------------------   BP:        130/61      Pulse:         84           Resp:          12           Temp:      SpO2:          93%         ---------------------------

## 2022-05-05 NOTE — PLAN OF CARE
Goal Outcome Evaluation:  Plan of Care Reviewed With: patient        Progress: improving  Outcome Evaluation: VSS. Pt post-op tonight. Voiding blood tinged urine. PRN pain meds given x2 as ordered. New IV started. Tolerating IV fluids well. No acute events overnight.

## 2022-05-05 NOTE — PROGRESS NOTES
"   LOS: 0 days   Patient Care Team:  Serg Dale MD as PCP - General (Family Medicine)  Ashu Escamilla (Ophthalmology)  Birdie Spencer MD (Pulmonary Disease)    Subjective     Subjective:  Symptoms:  (Hematuria clearing, some pain with urination).    Diet:  Adequate intake.  No nausea or vomiting.    Pain:  He reports pain is improving.        History taken from: patient chart    Objective     Vital Signs  Temp:  [96.9 °F (36.1 °C)-98.2 °F (36.8 °C)] 97.4 °F (36.3 °C)  Heart Rate:  [] 84  Resp:  [18-20] 18  BP: (128-183)/(70-97) 143/72    Objective:  General Appearance:  In no acute distress.    Vital signs: (most recent): Blood pressure 143/72, pulse 84, temperature 97.4 °F (36.3 °C), temperature source Oral, resp. rate 18, height 180.3 cm (71\"), weight 85.3 kg (188 lb), SpO2 97 %.  No fever.    Output: Producing urine.    Neurological: Patient is alert and oriented to person, place and time.    Pupils:  Pupils are equal, round, and reactive to light.    Skin:  Warm and dry.              Results Review:    Lab Results (last 24 hours)     Procedure Component Value Units Date/Time    POC Glucose Once [[id number]]  (Abnormal) Collected: 05/05/22 1012    Specimen: Blood Updated: 05/05/22 1044     Glucose 200 mg/dL      Comment: RN NotifiedOperator: [id number] JARETT NOÉMeter ID: [id number]       POC Glucose Once [[id number]]  (Abnormal) Collected: 05/05/22 0645    Specimen: Blood Updated: 05/05/22 0700     Glucose 198 mg/dL      Comment: RN NotifiedOperator: [id number] ARYA Lui ID: [id number]       Basic Metabolic Panel [[id number]]  (Abnormal) Collected: 05/05/22 0542    Specimen: Blood Updated: 05/05/22 0647     Glucose 243 mg/dL      BUN 17 mg/dL      Creatinine 1.37 mg/dL      Sodium 136 mmol/L      Potassium 3.9 mmol/L      Chloride 104 mmol/L      CO2 19.0 mmol/L      Calcium 9.3 mg/dL      BUN/Creatinine Ratio 12.4     Anion Gap 13.0 mmol/L      eGFR 52.1 mL/min/1.73      " Comment: National Kidney Foundation and American Society of Nephrology (ASN) Task Force recommended calculation based on the Chronic Kidney Disease Epidemiology Collaboration (CKD-EPI) equation refit without adjustment for race.       Narrative:      GFR Normal >60  Chronic Kidney Disease <60  Kidney Failure <15      CBC & Differential [[id number]]  (Abnormal) Collected: 05/05/22 0542    Specimen: Blood Updated: 05/05/22 0629    Narrative:      The following orders were created for panel order CBC & Differential.  Procedure                               Abnormality         Status                     ---------                               -----------         ------                     CBC Auto Differential[[id number]]        Abnormal            Final result                 Please view results for these tests on the individual orders.    CBC Auto Differential [[id number]]  (Abnormal) Collected: 05/05/22 0542    Specimen: Blood Updated: 05/05/22 0629     WBC 8.69 10*3/mm3      RBC 5.00 10*6/mm3      Hemoglobin 14.0 g/dL      Hematocrit 42.7 %      MCV 85.4 fL      MCH 28.0 pg      MCHC 32.8 g/dL      RDW 14.4 %      RDW-SD 44.9 fl      MPV 11.4 fL      Platelets 190 10*3/mm3      Neutrophil % 73.1 %      Lymphocyte % 12.9 %      Monocyte % 12.7 %      Eosinophil % 0.2 %      Basophil % 0.5 %      Immature Grans % 0.6 %      Neutrophils, Absolute 6.36 10*3/mm3      Lymphocytes, Absolute 1.12 10*3/mm3      Monocytes, Absolute 1.10 10*3/mm3      Eosinophils, Absolute 0.02 10*3/mm3      Basophils, Absolute 0.04 10*3/mm3      Immature Grans, Absolute 0.05 10*3/mm3      nRBC 0.0 /100 WBC     POC Glucose Once [[id number]]  (Abnormal) Collected: 05/04/22 2204    Specimen: Blood Updated: 05/05/22 0031     Glucose 168 mg/dL      Comment: RN NotifiedOperator: [id number] ARYA Lui ID: [id number]       POC Glucose Once [[id number]]  (Normal) Collected: 05/04/22 2003    Specimen: Blood Updated: 05/04/22 2019     Glucose 86  mg/dL      Comment: : [id number] PALLAVI SOSAMeter ID: [id number]       POC Glucose Once [[id number]]  (Normal) Collected: 05/04/22 1652    Specimen: Blood Updated: 05/04/22 1705     Glucose 73 mg/dL      Comment: RN NotifiedOperator: [id number] CAMERON OLVERAMeter ID: [id number]            Imaging Results (Last 24 Hours)     Procedure Component Value Units Date/Time    FL Retrograde Pyelogram In OR [[id number]] Resulted: 05/05/22 0831     Updated: 05/05/22 0831           I reviewed the patient's new clinical results.  I reviewed the patient's new imaging results and agree with the interpretation.  I reviewed the patient's other test results and agree with the interpretation      Assessment/Plan       Kidney stone    Acute bilateral obstructive uropathy      Assessment & Plan    POD 1 bilateral J-stent placement for bilateral ureter stones, hematuria is clearing, no fever.  Estimated Creatinine Clearance: 51.9 mL/min (A) (by C-G formula based on SCr of 1.37 mg/dL (H)).    Plan:  Cleared for discharge from Urology standpoint when tolerating diet/no nausea/vomiting, no fever, adequate urine output with hematuria that is clearing, pain controlled. Follow up Monday with Dr. Landa.     BOZENA Velazquez  05/05/22  12:06 CDT

## 2022-05-05 NOTE — NURSING NOTE
"Pt requesting to d/c home tonight as he is \"very busy tomorrow.\" Pt okay to d/c tonight post-op per Dr. Landa. Hospitalist tonight made aware. Will attempt to d/c early in the AM per Dr. Saucedo. Pt informed of plan to d/c in AM and pt agreeable to this as pain starting to kick in. PRN pain meds given - see MAR for details. Pt voiding blood tinged urine frequently. Will continue to monitor.  "

## 2022-05-05 NOTE — PROGRESS NOTES
[facility] Medicine Services  INPATIENT PROGRESS NOTE    Length of Stay: 0  Date of Admission: 5/3/2022  Primary Care Physician: Serg Dale MD    Subjective   Chief Complaint: Pain with urination and hematuria    HPI:  82 year old male who presented with 3 days of abdominal pain.  He was found to have bilateral obstructing ureterovesicalar junction calculi.  Urology was consulted and the patient underwent cystoscopy with stent placement. Patient is having pain and hematuria this am.     Review of Systems   Constitutional: Negative for activity change and fatigue.   HENT: Negative for ear pain and sore throat.    Eyes: Negative for pain and discharge.   Respiratory: Negative for cough and shortness of breath.    Cardiovascular: Negative for chest pain and palpitations.   Gastrointestinal: Negative for abdominal pain and nausea.   Endocrine: Negative for cold intolerance and heat intolerance.   Genitourinary: Positive for flank pain and hematuria. Negative for difficulty urinating and dysuria.   Musculoskeletal: Negative for arthralgias and gait problem.   Skin: Negative for color change and rash.   Neurological: Negative for dizziness and weakness.   Psychiatric/Behavioral: Negative for agitation and confusion.        Objective    Temp:  [96.9 °F (36.1 °C)-98.2 °F (36.8 °C)] 97.4 °F (36.3 °C)  Heart Rate:  [] 84  Resp:  [18-20] 18  BP: (128-183)/(70-97) 143/72    Physical Exam  Constitutional:       Appearance: He is well-developed.   HENT:      Head: Normocephalic and atraumatic.   Eyes:      Pupils: Pupils are equal, round, and reactive to light.   Cardiovascular:      Rate and Rhythm: Normal rate and regular rhythm.   Pulmonary:      Effort: Pulmonary effort is normal.      Breath sounds: Normal breath sounds.   Abdominal:      General: Bowel sounds are normal.      Palpations: Abdomen is soft.   Musculoskeletal:         General: Normal range of motion.      Cervical back: Normal range of  motion and neck supple.   Skin:     General: Skin is warm and dry.   Neurological:      Mental Status: He is alert and oriented to person, place, and time.   Psychiatric:         Behavior: Behavior normal.       Results Review:  I have reviewed the labs, radiology results, and diagnostic studies.    Laboratory Data:   Results from last 7 days   Lab Units 05/05/22  0542 05/04/22  0552 05/03/22  1702   SODIUM mmol/L 136 141 139   POTASSIUM mmol/L 3.9 4.0 4.4   CHLORIDE mmol/L 104 110* 103   CO2 mmol/L 19.0* 21.0* 22.0   BUN mg/dL 17 23 27*   CREATININE mg/dL 1.37* 2.16* 2.40*   GLUCOSE mg/dL 243* 100* 144*   CALCIUM mg/dL 9.3 8.5* 9.3   BILIRUBIN mg/dL  --   --  0.9   ALK PHOS U/L  --   --  67   ALT (SGPT) U/L  --   --  14   AST (SGOT) U/L  --   --  17   ANION GAP mmol/L 13.0 10.0 14.0     Estimated Creatinine Clearance: 51.9 mL/min (A) (by C-G formula based on SCr of 1.37 mg/dL (H)).          Results from last 7 days   Lab Units 05/05/22 0542 05/04/22 0552 05/03/22  1702   WBC 10*3/mm3 8.69 7.50 10.83*   HEMOGLOBIN g/dL 14.0 12.5* 14.6   HEMATOCRIT % 42.7 37.9 44.8   PLATELETS 10*3/mm3 190 170 200           Culture Data:   No results found for: BLOODCX  No results found for: URINECX  No results found for: RESPCX  No results found for: WOUNDCX  No results found for: STOOLCX  No components found for: BODYFLD    Radiology Data:   Imaging Results (Last 24 Hours)     Procedure Component Value Units Date/Time    FL Retrograde Pyelogram In OR [[id number]] Resulted: 05/05/22 0831     Updated: 05/05/22 0831          I have reviewed the patient's current medications.     Assessment/Plan     Active Hospital Problems    Diagnosis    • **Kidney stone      Added automatically from request for surgery [id number]     • Acute bilateral obstructive uropathy        Plan:    1.  Ureteral stones, bilateral: POD #1 bilateral J stent placement.  Urology consult appreciated.  Continue Flomax.  2.  CAD/hypertension: Continue home carvedilol,  aspirin, Plavix, Entresto and statin.  3.  Diabetes mellitus, type II: Continue SSI.  4.  Hypothyroidism: Continue home Synthroid.    DVT prophylaxis: SCDs        Discharge Planning: I expect patient to be discharged to home in 1-2 days.    I confirmed that the patient's Advance Care Plan is present, code status is documented, or surrogate decision maker is listed in the patient's medical record.      I have utilized all available immediate resources to obtain, update, or review the patient's current medications.         This document has been electronically signed by BOZENA Oliveira on May 5, 2022 12:34 CDT

## 2022-05-05 NOTE — H&P (VIEW-ONLY)
[facility] Medicine Services  INPATIENT PROGRESS NOTE    Length of Stay: 0  Date of Admission: 5/3/2022  Primary Care Physician: Serg Dale MD    Subjective   Chief Complaint: Pain with urination and hematuria    HPI:  82 year old male who presented with 3 days of abdominal pain.  He was found to have bilateral obstructing ureterovesicalar junction calculi.  Urology was consulted and the patient underwent cystoscopy with stent placement. Patient is having pain and hematuria this am.     Review of Systems   Constitutional: Negative for activity change and fatigue.   HENT: Negative for ear pain and sore throat.    Eyes: Negative for pain and discharge.   Respiratory: Negative for cough and shortness of breath.    Cardiovascular: Negative for chest pain and palpitations.   Gastrointestinal: Negative for abdominal pain and nausea.   Endocrine: Negative for cold intolerance and heat intolerance.   Genitourinary: Positive for flank pain and hematuria. Negative for difficulty urinating and dysuria.   Musculoskeletal: Negative for arthralgias and gait problem.   Skin: Negative for color change and rash.   Neurological: Negative for dizziness and weakness.   Psychiatric/Behavioral: Negative for agitation and confusion.        Objective    Temp:  [96.9 °F (36.1 °C)-98.2 °F (36.8 °C)] 97.4 °F (36.3 °C)  Heart Rate:  [] 84  Resp:  [18-20] 18  BP: (128-183)/(70-97) 143/72    Physical Exam  Constitutional:       Appearance: He is well-developed.   HENT:      Head: Normocephalic and atraumatic.   Eyes:      Pupils: Pupils are equal, round, and reactive to light.   Cardiovascular:      Rate and Rhythm: Normal rate and regular rhythm.   Pulmonary:      Effort: Pulmonary effort is normal.      Breath sounds: Normal breath sounds.   Abdominal:      General: Bowel sounds are normal.      Palpations: Abdomen is soft.   Musculoskeletal:         General: Normal range of motion.      Cervical back: Normal range of  motion and neck supple.   Skin:     General: Skin is warm and dry.   Neurological:      Mental Status: He is alert and oriented to person, place, and time.   Psychiatric:         Behavior: Behavior normal.       Results Review:  I have reviewed the labs, radiology results, and diagnostic studies.    Laboratory Data:   Results from last 7 days   Lab Units 05/05/22  0542 05/04/22  0552 05/03/22  1702   SODIUM mmol/L 136 141 139   POTASSIUM mmol/L 3.9 4.0 4.4   CHLORIDE mmol/L 104 110* 103   CO2 mmol/L 19.0* 21.0* 22.0   BUN mg/dL 17 23 27*   CREATININE mg/dL 1.37* 2.16* 2.40*   GLUCOSE mg/dL 243* 100* 144*   CALCIUM mg/dL 9.3 8.5* 9.3   BILIRUBIN mg/dL  --   --  0.9   ALK PHOS U/L  --   --  67   ALT (SGPT) U/L  --   --  14   AST (SGOT) U/L  --   --  17   ANION GAP mmol/L 13.0 10.0 14.0     Estimated Creatinine Clearance: 51.9 mL/min (A) (by C-G formula based on SCr of 1.37 mg/dL (H)).          Results from last 7 days   Lab Units 05/05/22 0542 05/04/22 0552 05/03/22  1702   WBC 10*3/mm3 8.69 7.50 10.83*   HEMOGLOBIN g/dL 14.0 12.5* 14.6   HEMATOCRIT % 42.7 37.9 44.8   PLATELETS 10*3/mm3 190 170 200           Culture Data:   No results found for: BLOODCX  No results found for: URINECX  No results found for: RESPCX  No results found for: WOUNDCX  No results found for: STOOLCX  No components found for: BODYFLD    Radiology Data:   Imaging Results (Last 24 Hours)     Procedure Component Value Units Date/Time    FL Retrograde Pyelogram In OR [[id number]] Resulted: 05/05/22 0831     Updated: 05/05/22 0831          I have reviewed the patient's current medications.     Assessment/Plan     Active Hospital Problems    Diagnosis    • **Kidney stone      Added automatically from request for surgery [id number]     • Acute bilateral obstructive uropathy        Plan:    1.  Ureteral stones, bilateral: POD #1 bilateral J stent placement.  Urology consult appreciated.  Continue Flomax.  2.  CAD/hypertension: Continue home carvedilol,  aspirin, Plavix, Entresto and statin.  3.  Diabetes mellitus, type II: Continue SSI.  4.  Hypothyroidism: Continue home Synthroid.    DVT prophylaxis: SCDs        Discharge Planning: I expect patient to be discharged to home in 1-2 days.    I confirmed that the patient's Advance Care Plan is present, code status is documented, or surrogate decision maker is listed in the patient's medical record.      I have utilized all available immediate resources to obtain, update, or review the patient's current medications.         This document has been electronically signed by BOZENA Oliveira on May 5, 2022 12:34 CDT

## 2022-05-05 NOTE — ANESTHESIA PROCEDURE NOTES
Airway  Date/Time: 5/4/2022 7:34 PM  End Time:5/4/2022 7:34 AM  Airway not difficult    General Information and Staff    Patient location during procedure: OR  CRNA/CAA: Jac Kowalski CRNA  SRNA: Montse Aragon SRNA  Indications and Patient Condition  Indications for airway management: airway protection and CNS depression    Preoxygenated: yes  MILS maintained throughout      Final Airway Details  Final airway type: supraglottic airway      Successful airway: I-gel  Size 4    Cormack-Lehane Classification: grade I - full view of glottis  Number of attempts at approach: 1  Assessment: lips, teeth, and gum same as pre-op and atraumatic intubation

## 2022-05-05 NOTE — PLAN OF CARE
"  Problem: Adult Inpatient Plan of Care  Goal: Plan of Care Review  Outcome: Ongoing, Progressing  Flowsheets  Taken 5/5/2022 1403 by Karina Blake, RN  Progress: improving  Outcome Evaluation: No complaints of pain.  States urine is \"still red,\"  explained that with this procedure urine can be blood tinged.   No new complaints.  Continuing to monitor.  Taken 5/5/2022 0639 by Delaney Wyatt RN  Plan of Care Reviewed With: patient   Goal Outcome Evaluation:           Progress: improving  Outcome Evaluation: No complaints of pain.  States urine is \"still red,\"  explained that with this procedure urine can be blood tinged.   No new complaints.  Continuing to monitor.  "

## 2022-05-06 ENCOUNTER — READMISSION MANAGEMENT (OUTPATIENT)
Dept: CALL CENTER | Facility: HOSPITAL | Age: 82
End: 2022-05-06

## 2022-05-06 VITALS
TEMPERATURE: 98 F | HEART RATE: 81 BPM | SYSTOLIC BLOOD PRESSURE: 174 MMHG | BODY MASS INDEX: 25.47 KG/M2 | DIASTOLIC BLOOD PRESSURE: 84 MMHG | OXYGEN SATURATION: 96 % | WEIGHT: 181.9 LBS | RESPIRATION RATE: 18 BRPM | HEIGHT: 71 IN

## 2022-05-06 LAB
ALBUMIN SERPL-MCNC: 3.3 G/DL (ref 3.5–5.2)
ALBUMIN/GLOB SERPL: 1.3 G/DL
ALP SERPL-CCNC: 50 U/L (ref 39–117)
ALT SERPL W P-5'-P-CCNC: 9 U/L (ref 1–41)
ANION GAP SERPL CALCULATED.3IONS-SCNC: 11 MMOL/L (ref 5–15)
AST SERPL-CCNC: 13 U/L (ref 1–40)
BASOPHILS # BLD AUTO: 0.05 10*3/MM3 (ref 0–0.2)
BASOPHILS NFR BLD AUTO: 0.6 % (ref 0–1.5)
BILIRUB SERPL-MCNC: 0.9 MG/DL (ref 0–1.2)
BUN SERPL-MCNC: 13 MG/DL (ref 8–23)
BUN/CREAT SERPL: 10.8 (ref 7–25)
CALCIUM SPEC-SCNC: 8.7 MG/DL (ref 8.6–10.5)
CHLORIDE SERPL-SCNC: 107 MMOL/L (ref 98–107)
CO2 SERPL-SCNC: 22 MMOL/L (ref 22–29)
CREAT SERPL-MCNC: 1.2 MG/DL (ref 0.76–1.27)
DEPRECATED RDW RBC AUTO: 44.6 FL (ref 37–54)
EGFRCR SERPLBLD CKD-EPI 2021: 61.1 ML/MIN/1.73
EOSINOPHIL # BLD AUTO: 0.25 10*3/MM3 (ref 0–0.4)
EOSINOPHIL NFR BLD AUTO: 3.1 % (ref 0.3–6.2)
ERYTHROCYTE [DISTWIDTH] IN BLOOD BY AUTOMATED COUNT: 14.5 % (ref 12.3–15.4)
GLOBULIN UR ELPH-MCNC: 2.6 GM/DL
GLUCOSE BLDC GLUCOMTR-MCNC: 109 MG/DL (ref 70–130)
GLUCOSE SERPL-MCNC: 118 MG/DL (ref 65–99)
HCT VFR BLD AUTO: 40.7 % (ref 37.5–51)
HGB BLD-MCNC: 13.8 G/DL (ref 13–17.7)
IMM GRANULOCYTES # BLD AUTO: 0.02 10*3/MM3 (ref 0–0.05)
IMM GRANULOCYTES NFR BLD AUTO: 0.2 % (ref 0–0.5)
LYMPHOCYTES # BLD AUTO: 2.03 10*3/MM3 (ref 0.7–3.1)
LYMPHOCYTES NFR BLD AUTO: 25 % (ref 19.6–45.3)
MCH RBC QN AUTO: 28.9 PG (ref 26.6–33)
MCHC RBC AUTO-ENTMCNC: 33.9 G/DL (ref 31.5–35.7)
MCV RBC AUTO: 85.1 FL (ref 79–97)
MONOCYTES # BLD AUTO: 1.06 10*3/MM3 (ref 0.1–0.9)
MONOCYTES NFR BLD AUTO: 13.1 % (ref 5–12)
NEUTROPHILS NFR BLD AUTO: 4.7 10*3/MM3 (ref 1.7–7)
NEUTROPHILS NFR BLD AUTO: 58 % (ref 42.7–76)
NRBC BLD AUTO-RTO: 0 /100 WBC (ref 0–0.2)
PLATELET # BLD AUTO: 186 10*3/MM3 (ref 140–450)
PMV BLD AUTO: 11.7 FL (ref 6–12)
POTASSIUM SERPL-SCNC: 3.6 MMOL/L (ref 3.5–5.2)
PROT SERPL-MCNC: 5.9 G/DL (ref 6–8.5)
RBC # BLD AUTO: 4.78 10*6/MM3 (ref 4.14–5.8)
SODIUM SERPL-SCNC: 140 MMOL/L (ref 136–145)
WBC NRBC COR # BLD: 8.11 10*3/MM3 (ref 3.4–10.8)

## 2022-05-06 PROCEDURE — 63710000001 ASPIRIN 81 MG TABLET DELAYED-RELEASE: Performed by: UROLOGY

## 2022-05-06 PROCEDURE — 80053 COMPREHEN METABOLIC PANEL: CPT | Performed by: NURSE PRACTITIONER

## 2022-05-06 PROCEDURE — A9270 NON-COVERED ITEM OR SERVICE: HCPCS | Performed by: UROLOGY

## 2022-05-06 PROCEDURE — 63710000001 FOLIC ACID 1 MG TABLET: Performed by: UROLOGY

## 2022-05-06 PROCEDURE — A9270 NON-COVERED ITEM OR SERVICE: HCPCS | Performed by: FAMILY MEDICINE

## 2022-05-06 PROCEDURE — 63710000001 SACUBITRIL-VALSARTAN 24-26 MG TABLET: Performed by: UROLOGY

## 2022-05-06 PROCEDURE — 63710000001 TAMSULOSIN 0.4 MG CAPSULE: Performed by: UROLOGY

## 2022-05-06 PROCEDURE — 82962 GLUCOSE BLOOD TEST: CPT

## 2022-05-06 PROCEDURE — 63710000001 PSYLLIUM 58.12 % PACK: Performed by: FAMILY MEDICINE

## 2022-05-06 PROCEDURE — 63710000001 DOCUSATE SODIUM 100 MG CAPSULE: Performed by: UROLOGY

## 2022-05-06 PROCEDURE — A9270 NON-COVERED ITEM OR SERVICE: HCPCS

## 2022-05-06 PROCEDURE — 63710000001 LEVOTHYROXINE 25 MCG TABLET: Performed by: UROLOGY

## 2022-05-06 PROCEDURE — 63710000001 POLYETHYLENE GLYCOL 17 G PACK

## 2022-05-06 PROCEDURE — 85025 COMPLETE CBC W/AUTO DIFF WBC: CPT | Performed by: NURSE PRACTITIONER

## 2022-05-06 PROCEDURE — 63710000001 CLOPIDOGREL 75 MG TABLET: Performed by: UROLOGY

## 2022-05-06 PROCEDURE — 63710000001 CARVEDILOL 6.25 MG TABLET: Performed by: UROLOGY

## 2022-05-06 PROCEDURE — G0378 HOSPITAL OBSERVATION PER HR: HCPCS

## 2022-05-06 PROCEDURE — 63710000001 PANTOPRAZOLE 40 MG TABLET DELAYED-RELEASE: Performed by: UROLOGY

## 2022-05-06 RX ADMIN — FOLIC ACID 1000 MCG: 1 TABLET ORAL at 08:26

## 2022-05-06 RX ADMIN — POLYETHYLENE GLYCOL 3350 17 G: 17 POWDER, FOR SOLUTION ORAL at 08:26

## 2022-05-06 RX ADMIN — DOCUSATE SODIUM 100 MG: 100 CAPSULE, LIQUID FILLED ORAL at 08:26

## 2022-05-06 RX ADMIN — Medication 10 ML: at 08:27

## 2022-05-06 RX ADMIN — CLOPIDOGREL BISULFATE 75 MG: 75 TABLET ORAL at 08:26

## 2022-05-06 RX ADMIN — PANTOPRAZOLE SODIUM 40 MG: 40 TABLET, DELAYED RELEASE ORAL at 08:26

## 2022-05-06 RX ADMIN — LEVOTHYROXINE SODIUM 25 MCG: 25 TABLET ORAL at 08:26

## 2022-05-06 RX ADMIN — CARVEDILOL 6.25 MG: 6.25 TABLET, FILM COATED ORAL at 08:26

## 2022-05-06 RX ADMIN — TAMSULOSIN HYDROCHLORIDE 0.4 MG: 0.4 CAPSULE ORAL at 08:26

## 2022-05-06 RX ADMIN — SACUBITRIL AND VALSARTAN 1 TABLET: 24; 26 TABLET, FILM COATED ORAL at 08:26

## 2022-05-06 RX ADMIN — ASPIRIN 81 MG: 81 TABLET, FILM COATED ORAL at 08:26

## 2022-05-06 RX ADMIN — PSYLLIUM HUSK 1 PACKET: 3.4 POWDER ORAL at 08:27

## 2022-05-06 NOTE — OUTREACH NOTE
Prep Survey    Flowsheet Row Responses   [facility] facility patient discharged from? [place]   Is LACE score < 7 ? No   Emergency Room discharge w/ pulse ox? No   Eligibility [facility]   Date of Admission 05/03/22   Date of Discharge 05/06/22   Discharge Disposition Home or Self Care   Discharge diagnosis Kidney stone  MARV  CYSTOSCOPY URETEROSCOPY RETROGRADE PYELOGRAM STENT INSERTION   Does the patient have one of the following disease processes/diagnoses(primary or secondary)? General Surgery   Does the patient have Home health ordered? No   Is there a DME ordered? No   Prep survey completed? Yes          BELA BETTS - Registered Nurse

## 2022-05-06 NOTE — DISCHARGE SUMMARY
[facility] Medicine Services  DISCHARGE SUMMARY       Date of Admission: 5/3/2022  Date of Discharge:  5/6/2022  Primary Care Physician: Serg Dale MD    Presenting Problem/History of Present Illness:  Acute bilateral obstructive uropathy [N13.9]  MARV (acute kidney injury) (HCC) [N17.9]  Calculus of gallbladder without cholecystitis without obstruction [K80.20]     Final Discharge Diagnoses:  Active Hospital Problems    Diagnosis    • **Kidney stone      Added automatically from request for surgery [id number]     • Acute bilateral obstructive uropathy        Consults:   Consults     Date and Time Order Name Status Description    5/3/2022 10:08 PM Inpatient Urology Consult Completed           Procedures Performed: Procedure(s):  CYSTOSCOPY URETEROSCOPY RETROGRADE PYELOGRAM STENT INSERTION                Pertinent Test Results:   Lab Results (last 24 hours)     Procedure Component Value Units Date/Time    Comprehensive Metabolic Panel [[id number]]  (Abnormal) Collected: 05/06/22 0554    Specimen: Blood Updated: 05/06/22 0652     Glucose 118 mg/dL      BUN 13 mg/dL      Creatinine 1.20 mg/dL      Sodium 140 mmol/L      Potassium 3.6 mmol/L      Chloride 107 mmol/L      CO2 22.0 mmol/L      Calcium 8.7 mg/dL      Total Protein 5.9 g/dL      Albumin 3.30 g/dL      ALT (SGPT) 9 U/L      AST (SGOT) 13 U/L      Alkaline Phosphatase 50 U/L      Total Bilirubin 0.9 mg/dL      Globulin 2.6 gm/dL      A/G Ratio 1.3 g/dL      BUN/Creatinine Ratio 10.8     Anion Gap 11.0 mmol/L      eGFR 61.1 mL/min/1.73      Comment: National Kidney Foundation and American Society of Nephrology (ASN) Task Force recommended calculation based on the Chronic Kidney Disease Epidemiology Collaboration (CKD-EPI) equation refit without adjustment for race.       Narrative:      GFR Normal >60  Chronic Kidney Disease <60  Kidney Failure <15      POC Glucose Once [[id number]]  (Normal) Collected: 05/06/22 0626    Specimen: Blood Updated:  05/06/22 0640     Glucose 109 mg/dL      Comment: RN NotifiedOperator: [id number] NAZANIN DOTSONIMIEMeter ID: [id number]       CBC & Differential [[id number]]  (Abnormal) Collected: 05/06/22 0554    Specimen: Blood Updated: 05/06/22 0636    Narrative:      The following orders were created for panel order CBC & Differential.  Procedure                               Abnormality         Status                     ---------                               -----------         ------                     CBC Auto Differential[[id number]]        Abnormal            Final result                 Please view results for these tests on the individual orders.    CBC Auto Differential [[id number]]  (Abnormal) Collected: 05/06/22 0554    Specimen: Blood Updated: 05/06/22 0636     WBC 8.11 10*3/mm3      RBC 4.78 10*6/mm3      Hemoglobin 13.8 g/dL      Hematocrit 40.7 %      MCV 85.1 fL      MCH 28.9 pg      MCHC 33.9 g/dL      RDW 14.5 %      RDW-SD 44.6 fl      MPV 11.7 fL      Platelets 186 10*3/mm3      Neutrophil % 58.0 %      Lymphocyte % 25.0 %      Monocyte % 13.1 %      Eosinophil % 3.1 %      Basophil % 0.6 %      Immature Grans % 0.2 %      Neutrophils, Absolute 4.70 10*3/mm3      Lymphocytes, Absolute 2.03 10*3/mm3      Monocytes, Absolute 1.06 10*3/mm3      Eosinophils, Absolute 0.25 10*3/mm3      Basophils, Absolute 0.05 10*3/mm3      Immature Grans, Absolute 0.02 10*3/mm3      nRBC 0.0 /100 WBC     POC Glucose Once [[id number]]  (Abnormal) Collected: 05/05/22 2023    Specimen: Blood Updated: 05/05/22 2059     Glucose 140 mg/dL      Comment: RN NotifiedOperator: [id number] NAZANIN DOTSONIMIEMeter ID: [id number]       POC Glucose Once [[id number]]  (Abnormal) Collected: 05/05/22 1624    Specimen: Blood Updated: 05/05/22 1722     Glucose 234 mg/dL      Comment: RN NotifiedOperator: [id number] DEEDS JOSEPHINEMeter ID: [id number]           Imaging Results (Last 24 Hours)     ** No results found for the last 24 hours. **        Chief  "Complaint on Day of Discharge: No complaints.     Hospital Course:    82 year old male with PMH of hypothyroidism, BPH, HTN, HLD, DMII and OA that presented to [facility] on 5/3/2022 with complaints of 3 days of abdominal pain.  He was found to have bilateral obstructing ureterovesicalar junction calculi.  Urology was consulted and the patient underwent cystoscopy with stent placement. Pain is controlled and hematuria improved.  Patient is to follow with Dr. Landa on Monday 5/9/22.  Follow with PCP in one week.     Condition on Discharge:  Stable    Physical Exam on Discharge:  /84 (BP Location: Left arm, Patient Position: Lying)   Pulse 81   Temp 98 °F (36.7 °C) (Temporal)   Resp 18   Ht 180.3 cm (71\")   Wt 82.5 kg (181 lb 14.4 oz)   SpO2 96%   BMI 25.37 kg/m²   Physical Exam  Constitutional:       Appearance: He is well-developed.   HENT:      Head: Normocephalic and atraumatic.   Eyes:      Pupils: Pupils are equal, round, and reactive to light.   Cardiovascular:      Rate and Rhythm: Normal rate and regular rhythm.   Pulmonary:      Effort: Pulmonary effort is normal.      Breath sounds: Normal breath sounds.   Abdominal:      General: Bowel sounds are normal.      Palpations: Abdomen is soft.   Musculoskeletal:         General: Normal range of motion.      Cervical back: Normal range of motion and neck supple.   Skin:     General: Skin is warm and dry.   Neurological:      Mental Status: He is alert and oriented to person, place, and time.   Psychiatric:         Behavior: Behavior normal.       Discharge Disposition:  Home or Self Care    Discharge Medications:     Discharge Medications      Continue These Medications      Instructions Start Date   aspirin 81 MG EC tablet  Notes to patient: 05/07/2022   81 mg, Oral, Daily      benzonatate 100 MG capsule  Commonly known as: TESSALON  Notes to patient: As needed   100 mg, Oral, 3 Times Daily PRN      carvedilol 6.25 MG tablet  Commonly known as: " COREG  Notes to patient: 05/06/2022   6.25 mg, Oral, 2 Times Daily With Meals      clobetasol 0.05 % topical foam  Commonly known as: OLUX  Notes to patient: As directed   No dose, route, or frequency recorded.      clopidogrel 75 MG tablet  Commonly known as: PLAVIX  Notes to patient: 05/07/2022   75 mg, Oral, Daily      Contour Next EZ w/Device kit   USE AS DIRECTED TO CHECK BLOOD SUGAR DAILY      Entresto 24-26 MG tablet  Generic drug: sacubitril-valsartan  Notes to patient: 05/06/2022   1 tablet, Oral, 2 Times Daily      Farxiga 10 MG tablet  Generic drug: Dapagliflozin Propanediol  Notes to patient: 05/07/2022   10 mg, Oral, Daily      FIBERCON PO  Notes to patient: 05/07/2022   2 tablets, Oral, Daily      fluticasone 50 MCG/ACT nasal spray  Commonly known as: FLONASE  Notes to patient: 05/06/2022   2 sprays, Nasal, Daily, Administer 2 sprays in each nostril for each dose.      folic acid 1 MG tablet  Commonly known as: FOLVITE  Notes to patient: 05/07/2022   TAKE 1 TABLET DAILY      glimepiride 4 MG tablet  Commonly known as: AMARYL  Notes to patient: 05/06/2022   TAKE 1 TABLET TWICE A DAY      glucose blood test strip   Use test strips to check glucose once daily.      glucose monitor monitoring kit   1 each, Does not apply, Daily      Janumet  MG per tablet  Generic drug: sitaGLIPtin-metFORMIN  Notes to patient: 05/06/2022   TAKE 1 TABLET TWICE A DAY      Lancets misc   Use to test blood sugar once daily      levothyroxine 25 MCG tablet  Commonly known as: SYNTHROID, LEVOTHROID  Notes to patient: 05/07/2022   TAKE 1 TABLET DAILY      nitroglycerin 0.3 MG SL tablet  Commonly known as: NITROSTAT  Notes to patient: As needed   0.3 mg, Sublingual, Every 5 Minutes PRN      pantoprazole 40 MG EC tablet  Commonly known as: PROTONIX  Notes to patient: 05/07/2022   TAKE 1 TABLET DAILY      pravastatin 40 MG tablet  Commonly known as: PRAVACHOL  Notes to patient: 05/06/22022   TAKE 1 TABLET EVERY NIGHT       PreserVision AREDS 2+Multi Vit capsule  Notes to patient: 05/06/2022   1 capsule, Oral, 2 Times Daily      PROBIOTIC DAILY PO  Notes to patient: 05/07/2022   1 capsule, Oral, Daily      promethazine-dextromethorphan 6.25-15 MG/5ML syrup  Commonly known as: PROMETHAZINE-DM  Notes to patient: As needed   5 mL, Oral, Nightly PRN      spironolactone 12.5 MG tablet half tablet  Commonly known as: ALDACTONE  Notes to patient: 05/07/2022   12.5 mg, Oral, Daily      Stool Softener 100 MG capsule  Generic drug: Docusate Sodium  Notes to patient: 05/07/2022   100 mg, Oral, Daily      tamsulosin 0.4 MG capsule 24 hr capsule  Commonly known as: FLOMAX  Notes to patient: 05/07/2022   TAKE 1 CAPSULE DAILY             Discharge Diet:   Diet Instructions     Diet: Consistent Carbohydrate      Discharge Diet: Consistent Carbohydrate          Activity at Discharge:   Activity Instructions     Activity as Tolerated            Discharge Care Plan/Instructions: As above.     Follow-up Appointment:   Follow-up Information     Serg Dale MD .    Specialties: Family Medicine, Hospitalist  Contact information:  [address] [place] [address] [address] [place] [place] [address] [phone]             Beason, Peyman LUNA MD Follow up on 5/9/2022.    Specialty: Urology  Contact information:  [facility]  [address] [address] [phone]                           This document has been electronically signed by BOZENA Oliveira on May 6, 2022 14:21 CDT        Time: Greater than 30 minutes.

## 2022-05-06 NOTE — PLAN OF CARE
Goal Outcome Evaluation:              Outcome Evaluation: Pt rested well throughout shift. No c/o pain. Voiding without difficulty, states urine is dark pinkish red. Vitals stable. New orders for senna and miralax this shift.

## 2022-05-09 ENCOUNTER — TRANSITIONAL CARE MANAGEMENT TELEPHONE ENCOUNTER (OUTPATIENT)
Dept: CALL CENTER | Facility: HOSPITAL | Age: 82
End: 2022-05-09

## 2022-05-09 NOTE — OUTREACH NOTE
Call Center TCM Note    Flowsheet Row Responses   [facility] patient discharged from? [place]   Does the patient have one of the following disease processes/diagnoses(primary or secondary)? General Surgery   TCM attempt successful? No   Unsuccessful attempts Attempt 1  [Spoke with patient who was appt with Dr. Landa and requested call 5/10/22]          Bess Wallace RN    5/9/2022, 16:15 EDT

## 2022-05-10 ENCOUNTER — PREP FOR SURGERY (OUTPATIENT)
Dept: OTHER | Facility: HOSPITAL | Age: 82
End: 2022-05-10

## 2022-05-10 ENCOUNTER — TRANSITIONAL CARE MANAGEMENT TELEPHONE ENCOUNTER (OUTPATIENT)
Dept: CALL CENTER | Facility: HOSPITAL | Age: 82
End: 2022-05-10

## 2022-05-10 DIAGNOSIS — N20.0 KIDNEY STONE: Primary | ICD-10-CM

## 2022-05-10 NOTE — OUTREACH NOTE
Call Center TCM Note    Flowsheet Row Responses   [facility] patient discharged from? [place]   Does the patient have one of the following disease processes/diagnoses(primary or secondary)? General Surgery   TCM attempt successful? Yes   Call start time 1139   Call end time 1150   Discharge diagnosis Kidney stone  MARV  CYSTOSCOPY URETEROSCOPY RETROGRADE PYELOGRAM STENT INSERTION   Person spoke with today (if not patient) and relationship Mrs. Flakito Salamancas reviewed with patient/caregiver? Yes   Prescription comments No new medications ordered   Is the patient taking all medications as directed (includes completed medication regime)? Yes   Medication comments Patient is going to have to hold blood thinners for 5-7 days prior to next Urological procedure--awaiting scheduling date   Does the patient have a follow up appointment scheduled with their surgeon? Yes  [Patient has completed one f/u with Urology, Dr. Landa and is awaiting a call with scheduling as he must have another procedure]   Has the patient kept scheduled appointments due by today? N/A   Comments Patient will call PCP to self schedule appt after his next procedure is scheduled   Has home health visited the patient within 72 hours of discharge? N/A   Psychosocial issues? No   Did the patient receive a copy of their discharge instructions? Yes   Nursing interventions Reviewed instructions with patient   What is the patient's perception of their health status since discharge? Same  [Patient reports voiding w/o hematuria/afebrile,  tolerating bilateral stents and aware of precautions to monitor/notify urology if present.]   Nursing interventions Nurse provided patient education   Is the patient /caregiver able to teach back basic post-op care? Practice 'cough and deep breath', No tub bath, swimming, or hot tub until instructed by MD, Lifting as instructed by MD in discharge instructions   Is the patient/caregiver able to teach back steps to  recovery at home? Set small, achievable goals for return to baseline health, Rest and rebuild strength, gradually increase activity   Is the patient/caregiver able to teach back the hierarchy of who to call/visit for symptoms/problems? PCP, Specialist, Home health nurse, Urgent Care, ED, 911 Yes   TCM call completed? Yes          Bess Wallace RN    5/10/2022, 11:57 EDT

## 2022-05-17 ENCOUNTER — ANESTHESIA EVENT (OUTPATIENT)
Dept: PERIOP | Facility: HOSPITAL | Age: 82
End: 2022-05-17

## 2022-05-18 ENCOUNTER — READMISSION MANAGEMENT (OUTPATIENT)
Dept: CALL CENTER | Facility: HOSPITAL | Age: 82
End: 2022-05-18

## 2022-05-18 ENCOUNTER — ANESTHESIA (OUTPATIENT)
Dept: PERIOP | Facility: HOSPITAL | Age: 82
End: 2022-05-18

## 2022-05-18 ENCOUNTER — APPOINTMENT (OUTPATIENT)
Dept: GENERAL RADIOLOGY | Facility: HOSPITAL | Age: 82
End: 2022-05-18

## 2022-05-18 ENCOUNTER — HOSPITAL ENCOUNTER (OUTPATIENT)
Facility: HOSPITAL | Age: 82
Setting detail: HOSPITAL OUTPATIENT SURGERY
Discharge: HOME | End: 2022-05-18
Attending: UROLOGY | Admitting: UROLOGY

## 2022-05-18 VITALS
WEIGHT: 183.2 LBS | TEMPERATURE: 97 F | DIASTOLIC BLOOD PRESSURE: 74 MMHG | HEART RATE: 71 BPM | HEIGHT: 71 IN | SYSTOLIC BLOOD PRESSURE: 160 MMHG | OXYGEN SATURATION: 97 % | RESPIRATION RATE: 18 BRPM | BODY MASS INDEX: 25.65 KG/M2

## 2022-05-18 DIAGNOSIS — N20.0 KIDNEY STONE: ICD-10-CM

## 2022-05-18 DIAGNOSIS — N13.9 ACUTE BILATERAL OBSTRUCTIVE UROPATHY: Primary | ICD-10-CM

## 2022-05-18 LAB
BILIRUB UR QL STRIP: NEGATIVE
CLARITY UR: ABNORMAL
COLOR UR: YELLOW
GLUCOSE BLDC GLUCOMTR-MCNC: 110 MG/DL (ref 70–130)
GLUCOSE BLDC GLUCOMTR-MCNC: 139 MG/DL (ref 70–130)
GLUCOSE UR STRIP-MCNC: NEGATIVE MG/DL
HGB UR QL STRIP.AUTO: ABNORMAL
KETONES UR QL STRIP: ABNORMAL
LEUKOCYTE ESTERASE UR QL STRIP.AUTO: ABNORMAL
NITRITE UR QL STRIP: NEGATIVE
PH UR STRIP.AUTO: 5.5 [PH] (ref 5–9)
PROT UR QL STRIP: ABNORMAL
SP GR UR STRIP: 1.02 (ref 1–1.03)
UROBILINOGEN UR QL STRIP: ABNORMAL

## 2022-05-18 PROCEDURE — 25010000002 IOPAMIDOL 61 % SOLUTION: Performed by: UROLOGY

## 2022-05-18 PROCEDURE — 74420 UROGRAPHY RTRGR +-KUB: CPT

## 2022-05-18 PROCEDURE — 25010000002 ONDANSETRON PER 1 MG: Performed by: NURSE ANESTHETIST, CERTIFIED REGISTERED

## 2022-05-18 PROCEDURE — C1769 GUIDE WIRE: HCPCS | Performed by: UROLOGY

## 2022-05-18 PROCEDURE — 25010000002 PROPOFOL 10 MG/ML EMULSION: Performed by: NURSE ANESTHETIST, CERTIFIED REGISTERED

## 2022-05-18 PROCEDURE — 88300 SURGICAL PATH GROSS: CPT

## 2022-05-18 PROCEDURE — 25010000002 PHENYLEPHRINE 10 MG/ML SOLUTION: Performed by: NURSE ANESTHETIST, CERTIFIED REGISTERED

## 2022-05-18 PROCEDURE — 25010000002 CEFTRIAXONE PER 250 MG: Performed by: UROLOGY

## 2022-05-18 PROCEDURE — 25010000002 FENTANYL CITRATE (PF) 50 MCG/ML SOLUTION: Performed by: NURSE ANESTHETIST, CERTIFIED REGISTERED

## 2022-05-18 PROCEDURE — C2617 STENT, NON-COR, TEM W/O DEL: HCPCS | Performed by: UROLOGY

## 2022-05-18 PROCEDURE — C1758 CATHETER, URETERAL: HCPCS | Performed by: UROLOGY

## 2022-05-18 PROCEDURE — C1726 CATH, BAL DIL, NON-VASCULAR: HCPCS | Performed by: UROLOGY

## 2022-05-18 PROCEDURE — 82365 CALCULUS SPECTROSCOPY: CPT | Performed by: UROLOGY

## 2022-05-18 PROCEDURE — 81003 URINALYSIS AUTO W/O SCOPE: CPT | Performed by: UROLOGY

## 2022-05-18 PROCEDURE — 82962 GLUCOSE BLOOD TEST: CPT

## 2022-05-18 DEVICE — URETERAL STENT
Type: IMPLANTABLE DEVICE | Site: URETER | Status: FUNCTIONAL
Brand: PERCUFLEX™ PLUS

## 2022-05-18 RX ORDER — PROPOFOL 10 MG/ML
VIAL (ML) INTRAVENOUS AS NEEDED
Status: DISCONTINUED | OUTPATIENT
Start: 2022-05-18 | End: 2022-05-18 | Stop reason: SURG

## 2022-05-18 RX ORDER — NALOXONE HCL 0.4 MG/ML
0.4 VIAL (ML) INJECTION AS NEEDED
Status: DISCONTINUED | OUTPATIENT
Start: 2022-05-18 | End: 2022-05-18 | Stop reason: HOSPADM

## 2022-05-18 RX ORDER — LIDOCAINE HYDROCHLORIDE 20 MG/ML
INJECTION, SOLUTION INFILTRATION; PERINEURAL AS NEEDED
Status: DISCONTINUED | OUTPATIENT
Start: 2022-05-18 | End: 2022-05-18 | Stop reason: SURG

## 2022-05-18 RX ORDER — FENTANYL CITRATE 50 UG/ML
INJECTION, SOLUTION INTRAMUSCULAR; INTRAVENOUS AS NEEDED
Status: DISCONTINUED | OUTPATIENT
Start: 2022-05-18 | End: 2022-05-18 | Stop reason: SURG

## 2022-05-18 RX ORDER — LEVOFLOXACIN 250 MG/1
250 TABLET ORAL DAILY
Qty: 7 TABLET | Refills: 0 | Status: SHIPPED | OUTPATIENT
Start: 2022-05-18 | End: 2022-05-25

## 2022-05-18 RX ORDER — PHENYLEPHRINE HYDROCHLORIDE 10 MG/ML
INJECTION INTRAVENOUS AS NEEDED
Status: DISCONTINUED | OUTPATIENT
Start: 2022-05-18 | End: 2022-05-18 | Stop reason: SURG

## 2022-05-18 RX ORDER — OXYCODONE AND ACETAMINOPHEN 7.5; 325 MG/1; MG/1
1-2 TABLET ORAL EVERY 4 HOURS PRN
Qty: 10 TABLET | Refills: 0 | Status: SHIPPED | OUTPATIENT
Start: 2022-05-18 | End: 2022-06-10

## 2022-05-18 RX ORDER — EPHEDRINE SULFATE 50 MG/ML
5 INJECTION, SOLUTION INTRAVENOUS ONCE AS NEEDED
Status: DISCONTINUED | OUTPATIENT
Start: 2022-05-18 | End: 2022-05-18 | Stop reason: HOSPADM

## 2022-05-18 RX ORDER — ACETAMINOPHEN 325 MG/1
650 TABLET ORAL ONCE AS NEEDED
Status: DISCONTINUED | OUTPATIENT
Start: 2022-05-18 | End: 2022-05-18 | Stop reason: HOSPADM

## 2022-05-18 RX ORDER — ONDANSETRON 2 MG/ML
4 INJECTION INTRAMUSCULAR; INTRAVENOUS ONCE AS NEEDED
Status: DISCONTINUED | OUTPATIENT
Start: 2022-05-18 | End: 2022-05-18 | Stop reason: HOSPADM

## 2022-05-18 RX ORDER — SODIUM CHLORIDE 9 MG/ML
1000 INJECTION, SOLUTION INTRAVENOUS CONTINUOUS
Status: DISCONTINUED | OUTPATIENT
Start: 2022-05-18 | End: 2022-05-18 | Stop reason: HOSPADM

## 2022-05-18 RX ORDER — ONDANSETRON 2 MG/ML
INJECTION INTRAMUSCULAR; INTRAVENOUS AS NEEDED
Status: DISCONTINUED | OUTPATIENT
Start: 2022-05-18 | End: 2022-05-18 | Stop reason: SURG

## 2022-05-18 RX ORDER — ACETAMINOPHEN 650 MG/1
650 SUPPOSITORY RECTAL ONCE AS NEEDED
Status: DISCONTINUED | OUTPATIENT
Start: 2022-05-18 | End: 2022-05-18 | Stop reason: HOSPADM

## 2022-05-18 RX ADMIN — SODIUM CHLORIDE: 9 INJECTION, SOLUTION INTRAVENOUS at 08:28

## 2022-05-18 RX ADMIN — ONDANSETRON 4 MG: 2 INJECTION INTRAMUSCULAR; INTRAVENOUS at 09:00

## 2022-05-18 RX ADMIN — PHENYLEPHRINE HYDROCHLORIDE 100 MCG: 10 INJECTION, SOLUTION INTRAVENOUS at 08:34

## 2022-05-18 RX ADMIN — SODIUM CHLORIDE 1000 ML: 9 INJECTION, SOLUTION INTRAVENOUS at 06:50

## 2022-05-18 RX ADMIN — PROPOFOL 100 MG: 10 INJECTION, EMULSION INTRAVENOUS at 08:20

## 2022-05-18 RX ADMIN — LIDOCAINE HYDROCHLORIDE 50 MG: 20 INJECTION, SOLUTION INFILTRATION; PERINEURAL at 08:20

## 2022-05-18 RX ADMIN — FENTANYL CITRATE 25 MCG: 50 INJECTION INTRAMUSCULAR; INTRAVENOUS at 09:00

## 2022-05-18 RX ADMIN — PHENYLEPHRINE HYDROCHLORIDE 200 MCG: 10 INJECTION, SOLUTION INTRAVENOUS at 08:38

## 2022-05-18 RX ADMIN — FENTANYL CITRATE 25 MCG: 50 INJECTION INTRAMUSCULAR; INTRAVENOUS at 08:24

## 2022-05-18 RX ADMIN — FENTANYL CITRATE 25 MCG: 50 INJECTION INTRAMUSCULAR; INTRAVENOUS at 08:19

## 2022-05-18 NOTE — ANESTHESIA POSTPROCEDURE EVALUATION
Patient: Darwin Fraga    Procedure Summary     Date: 05/18/22 Room / Location: [facility] OR 02 / [facility] OR    Anesthesia Start: 0817 Anesthesia Stop:     Procedure: CYSTOSCOPY BILATERAL RETROGRADE URETEROSCOPY LASER LITHOTRIPSY STENT PLACEMENT    call patient for pre op (Bilateral ) Diagnosis:       Kidney stone      (Kidney stone [N20.0])    Surgeons: Peyman Landa MD Provider: Levy Oakes MD    Anesthesia Type: general ASA Status: 3          Anesthesia Type: general    Vitals  No vitals data found for the desired time range.          Post Anesthesia Care and Evaluation    Patient location during evaluation: PACU  Patient participation: complete - patient participated  Level of consciousness: sleepy but conscious  Pain score: 0  Pain management: adequate  Airway patency: patent  Anesthetic complications: No anesthetic complications  PONV Status: none  Cardiovascular status: acceptable and hemodynamically stable  Respiratory status: acceptable and face mask  Hydration status: acceptable    Comments: HR 68  /65  SPO2 98%  RR 14   Temp 97.2

## 2022-05-18 NOTE — ANESTHESIA PROCEDURE NOTES
Airway  Urgency: elective    Date/Time: 5/18/2022 8:23 AM  Airway not difficult    General Information and Staff    Patient location during procedure: OR  CRNA/CAA: Lisa Mccauley CRNA  SRNA: Geni Kahn SRNA  Indications and Patient Condition  Indications for airway management: airway protection    Preoxygenated: yes  MILS not maintained throughout  Mask difficulty assessment: 0 - not attempted    Final Airway Details  Final airway type: supraglottic airway      Successful airway: I-gel  Size 4    Number of attempts at approach: 1  Assessment: lips, teeth, and gum same as pre-op

## 2022-05-18 NOTE — ANESTHESIA PREPROCEDURE EVALUATION
Anesthesia Evaluation     Patient summary reviewed and Nursing notes reviewed   no history of anesthetic complications:  NPO Solid Status: > 8 hours  NPO Liquid Status: > 8 hours           Airway   Mallampati: II  TM distance: >3 FB  Neck ROM: full  possible difficult intubation  Comment: Patient location during procedure: OR  CRNA/CAA: Jac Kowalski CRNA  SRNA: Montse Aragon SRNA  Indications and Patient Condition  Indications for airway management: airway protection and CNS depression    Preoxygenated: yes  MILS maintained throughout        Final Airway Details  Final airway type: supraglottic airway        Successful airway: I-gel  Size 4    Cormack-Lehane Classification: grade I - full view of glottis  Number of attempts at approach: 1  Assessment: lips, teeth, and gum same as pre-op and atraumatic intubation  Dental    (+) poor dentation    Pulmonary - negative pulmonary ROS    breath sounds clear to auscultation  (+) decreased breath sounds,   (-) COPD, asthma, sleep apnea, not a smoker    ROS comment: LUNGS: Lung markings are prominent but unchanged. There is no  focal consolidation. There is no pleural effusion or  pneumothorax.     MEDIASTINUM AND CARDIOVASCULAR STRUCTURES: Cardiac silhouette not  enlarged. Central airways and mediastinal contour are  unremarkable.        BONES AND SOFT TISSUES: Unremarkable.        IMPRESSION:     No radiographic evidence of acute cardiopulmonary disease.     Electronically signed by:  Karri Estevez MD  5/3/2022 6:19 PM CDT  Cardiovascular - normal exam  Exercise tolerance: good (4-7 METS)    ECG reviewed  PT is on anticoagulation therapy  Patient on routine beta blocker  Rhythm: regular  Rate: normal    (+) pacemaker (2019) pacemaker, hypertension well controlled, past MI (2019)  >12 months, CAD, dysrhythmias (History of complete AV block/pacemaker), hyperlipidemia,   (-) angina, murmur, carotid bruits, cardiac stents    ROS comment: Normal sinus rhythm  Left bundle  branch block  Abnormal ECG  When compared with ECG of 04-DEC-2021 05:42,  Questionable change in QRS axis  T wave inversion now evident in Inferior leads    States he sees a cardiologist at [facility]    Neuro/Psych- negative ROS  (-) seizures, TIA, CVA, headaches, numbness, psychiatric history  GI/Hepatic/Renal/Endo    (+)  GERD well controlled,  renal disease (Creatinine 2.16) stones, diabetes mellitus type 2 well controlled, thyroid problem hypothyroidism  (-) hepatitis, liver disease    Musculoskeletal (-) negative ROS    Abdominal   (+) obese,    Substance History - negative use  (-) alcohol use, drug use     OB/GYN negative ob/gyn ROS         Other - negative ROS       (-) history of cancer                    Anesthesia Plan    ASA 3     general     intravenous induction     Anesthetic plan, all risks, benefits, and alternatives have been provided, discussed and informed consent has been obtained with: patient.        CODE STATUS:

## 2022-05-18 NOTE — PROGRESS NOTES
LOS: 0 days     Patient Care Team:  Serg Dale MD as PCP - General (Family Medicine)  Ashu Escamilla (Ophthalmology)  Birdie Spencer MD (Pulmonary Disease)      Subjective     Bilateral ureteral stones    Objective       Vital Signs  Temp:  [96.8 °F (36 °C)] 96.8 °F (36 °C)  Heart Rate:  [71] 71  Resp:  [18] 18  BP: (153)/(74) 153/74    Physical Exam:        General Appearance:   No acute distress     Respiratory:    UNLABORED RESPIRATIONS.     Abdomen:     SOFT.       Genitourinary:  Bilateral ureteral stones with stents     Rectal:     DEFERRED       Results Review:       Imaging Results (Last 24 Hours)     ** No results found for the last 24 hours. **        Lab Results (last 24 hours)     Procedure Component Value Units Date/Time    POC Glucose Once [[id number]]  (Abnormal) Collected: 05/18/22 0644    Specimen: Blood Updated: 05/18/22 0727     Glucose 139 mg/dL      Comment: : [id number] DOYLE ABBYMeter ID: [id number]       Urinalysis without microscopic (no culture) - Urine, Clean Catch [[id number]]  (Abnormal) Collected: 05/18/22 0645    Specimen: Urine, Clean Catch Updated: 05/18/22 0701     Color, UA Yellow     Appearance, UA Cloudy     pH, UA 5.5     Specific Gravity, UA 1.016     Glucose, UA Negative     Ketones, UA Trace     Bilirubin, UA Negative     Blood, UA Large (3+)     Protein,  mg/dL (2+)     Leuk Esterase, UA Large (3+)     Nitrite, UA Negative     Urobilinogen, UA 1.0 E.U./dL            I reviewed the patient's new clinical results.  I reviewed the patient's new imaging results and agree with the interpretation.  I reviewed the patient's other test results and agree with the interpretation        Assessment:    Bilateral ureteral stone    Plan:     Bilateral cystoscopy retrograde ureteroscopy's laser lithotripsy J stent placement risk and benefits of been discussed      Peyman Landa MD  05/18/22  08:00 CDT

## 2022-05-18 NOTE — OUTREACH NOTE
General Surgery Week 2 Survey    Flowsheet Row Responses   [facility] facility patient discharged from? [place]   Does the patient have one of the following disease processes/diagnoses(primary or secondary)? General Surgery   Week 2 attempt successful? No   Unsuccessful attempts Attempt 1   Rescheduled Revoked  [Patient currently admitted]          AURA T - Registered Nurse

## 2022-05-18 NOTE — OP NOTE
CYSTOSCOPY URETEROSCOPY RETROGRADE PYELOGRAM HOLMIUM LASER STENT INSERTION  Procedure Note    Darwin Fraga  5/18/2022    Pre-op Diagnosis:   Kidney stone [N20.0]    Post-op Diagnosis:     Post-Op Diagnosis Codes:     * Kidney stone [N20.0]    Procedure(s):  CYSTOSCOPY BILATERAL RETROGRADE URETEROSCOPY LASER LITHOTRIPSY STENT PLACEMENT    call patient for pre op    Surgeon(s):  Peyman Landa MD    Anesthesia: General    Staff:   Circulator: Irina Riggins RN; Netta Cornell RN  Scrub Person: Tatum Isbell  Assistant: Alesha Grant CSA    Assistant: Alesha Grant CSA was responsible for performing the following activities: Irrigation and their skilled assistance was necessary for the success of this case.     Estimated Blood Loss: none    Specimens:                ID Type Source Tests Collected by Time   1 :  Calculus Ureter, Left STONE ANALYSIS Peyman Landa MD 5/18/2022 0901   A : old stent, left and right ureter Hardware / Foreign Body Ureter, Left TISSUE EXAM, [facility] ([facility], [facility], [facility]) Peyman Landa MD 5/18/2022 0902         Drains: * No LDAs found *    Findings: Bilateral ureteral stones distal to retained J stent    Complications: None    Indications: Same    Description of Procedure: Patient brought surgery placed in the dorsolithotomy position.  Sterile prep and drape of the genitalia in routine fashion.  Passed a 22 Pakistani scope into the bladder left double-J stent was pulled down we put an 038 guidewire up through the stent in the left renal pelvis.  Over the top the guidewire through-the-scope I placed a 18 x 4 balloon dilator.  I dilated the ureter.  I put a retrograde catheter over the top of the guidewire into the left renal pelvis and shot retrograde studies to get position.  After this I put the 038 guidewire back in the left renal pelvis and then went up with a rigid ureteroscope to the stone in the gauged it with a stone basket and distracted it from  the ureter.  No stent left in patient on that side.    Next we turned our attention to the right side.  I passed a 20 Kiswahili scope into the bladder right J stent was pulled down.  I put an 038 guidewire back up through the old stent into the right renal pelvis.  [place] studies were shot for position then put the guidewire back in the right renal pelvis I dilated the ureter with the balloon dilator 18 x 4 again.  The guidewire we went up with the rigid ureteroscope to the distance ureter which was fragmented stone pieces.  No further obstruction was found.  Over the top guidewire through-the-scope I then placed a 8 x 26 double-J stent.  Bladder was drained scope was removed.  In the procedure right ureter had stent left ureteral stent was    Peyman Landa MD     Date: 5/18/2022  Time: 09:09 DEANNA

## 2022-05-20 ENCOUNTER — TELEPHONE (OUTPATIENT)
Dept: FAMILY MEDICINE CLINIC | Facility: CLINIC | Age: 82
End: 2022-05-20

## 2022-05-20 NOTE — TELEPHONE ENCOUNTER
Called patient to let him know that he was overdue a lab f/u appointment with Dr. Dale and he said that he just had outpatient surgery on 05/18/22 and said that he has got to have his pacemaker replaced and after all this is done he would call back and schedule appointment.

## 2022-05-22 NOTE — H&P
[facility]   HISTORY AND PHYSICAL    Patient Name: Darwin Fraga  : 1940  MRN: [id number]  Primary Care Physician:  Serg Dale MD  Date of admission: 2022    Subjective   Subjective   Bilateral flank pain  Chief Complaint: Bilateral flank pain from ureteral stones and stents    History of Present Illness patient comes in follow-up bilateral double-J stent placements for ureteral stones.  Primary symptoms are frequency and flank pain    Review of Systems no recent chest pain angina palpitation primary just flank pain and frequency of urination    Personal History     Past Medical History:   Diagnosis Date   • Acquired hypothyroidism    • Acute urinary tract infection    • Anemia    • Benign non-nodular prostatic hyperplasia with lower urinary tract symptoms    • Diverticular disease of colon    • Essential hypertension    • Gastrointestinal hemorrhage    • History of myocardial infarction    • Hyperlipidemia    • Impacted cerumen    • Low back pain     rad to left hip   • Macular degeneration    • Macular degeneration 2017    Dr. Trotter in [place], [place]   • Myocardial infarct (HCC)    • Otalgia    • Primary osteoarthritis involving multiple joints    • Special screening for malignant neoplasms, colon    • Type 2 diabetes mellitus without complication (HCC)    • Uric acid renal calculus    • Urinary incontinence        Past Surgical History:   Procedure Laterality Date   • CARDIAC CATHETERIZATION     • CARDIAC SURGERY      Pace maker insertion  Dr. Charles   • COLONOSCOPY     • CYSTOSCOPY, URETEROSCOPY, RETROGRADE PYELOGRAM, STENT INSERTION Bilateral 2022    Procedure: CYSTOSCOPY URETEROSCOPY RETROGRADE PYELOGRAM STENT INSERTION;  Surgeon: Syeda, Peyman LUNA MD;  Location: [facility];  Service: Urology;  Laterality: Bilateral;   • ENDOSCOPY AND COLONOSCOPY  2015    GI bleed   • EYE SURGERY      Lasik   • OTHER SURGICAL HISTORY  10/06/2015    Remove Impacted Cerumen  81259 (1)      • PACEMAKER IMPLANTATION  2019    [facility]    • TONSILLECTOMY  10/06/2015       Family History: family history includes COPD in his father; Cancer in his father and mother; Diabetes in his maternal grandfather; Emphysema in his father; Uterine cancer in his mother. Otherwise pertinent FHx was reviewed and not pertinent to current issue.    Social History:  reports that he has never smoked. He has never used smokeless tobacco. He reports that he does not drink alcohol and does not use drugs.    Home Medications:  Calcium Polycarbophil, Contour Next EZ, Dapagliflozin Propanediol, Docusate Sodium, Lancets, PreserVision AREDS 2+Multi Vit, Probiotic Product, aspirin, benzonatate, carvedilol, clopidogrel, fluticasone, folic acid, glimepiride, glucose blood, glucose monitor, levoFLOXacin, levothyroxine, nitroglycerin, oxyCODONE-acetaminophen, pantoprazole, pravastatin, promethazine-dextromethorphan, sacubitril-valsartan, sitaGLIPtin-metFORMIN, spironolactone, and tamsulosin    Allergies:  Allergies   Allergen Reactions   • Contrast Dye Hives and GI Intolerance   • Iodine Hives     Hives and vomiting with contrast   • Tuberculin Other (See Comments)     Redness and swelling       Objective    Objective     Vitals:        Physical Exam blood pressure 140/80 temp 99 respirations 18 neck supple lungs clear.  Heart distant heart sounds, abdomen benign, moves all 4 extremities well    Result Review    Result Review:  I have personally reviewed the results from the time of this admission to 5/22/2022 08:58 CDT and agree with these findings:  []  Laboratory  []  Microbiology  []  Radiology  []  EKG/Telemetry   []  Cardiology/Vascular   []  Pathology  []  Old records  []  Other:  Most notable findings include: Bilateral J stents in good position    Assessment & Plan Bilateral ureteral stones and bilateral J stents  Assessment / Plan     Brief Patient Summary:  Darwin Fraga is a 82 y.o. male who who  presented 2 weeks ago with bilateral ureteral stones with hydronephrosis.    Active Hospital Problems:  Active Hospital Problems    Diagnosis    • **Kidney stone      Added automatically from request for surgery [id number]       Plan: Cystoscopy bilateral retrograde bilateral ureteroscopy and laser lithotripsy risk and benefits of been discussed      DVT prophylaxis:  No DVT prophylaxis order currently exists.    CODE STATUS:       Admission Status:  I believe this patient meets  status.    Peyman Landa MD

## 2022-05-23 LAB
CALCIUM OXALATE DIHYDRATE MFR STONE IR: 10 %
COLOR STONE: NORMAL
COM MFR STONE: 90 %
COMPN STONE: NORMAL
LABORATORY COMMENT REPORT: NORMAL
Lab: NORMAL
Lab: NORMAL
PHOTO: NORMAL
REF LAB TEST METHOD: NORMAL
SIZE STONE: NORMAL MM
SPEC SOURCE SUBJ: NORMAL
WT STONE: 27 MG

## 2022-05-24 LAB
QT INTERVAL: 414 MS
QTC INTERVAL: 474 MS

## 2022-05-26 DIAGNOSIS — I25.10 CORONARY ARTERY DISEASE INVOLVING NATIVE CORONARY ARTERY OF NATIVE HEART WITHOUT ANGINA PECTORIS: Primary | ICD-10-CM

## 2022-05-26 RX ORDER — SACUBITRIL AND VALSARTAN 24; 26 MG/1; MG/1
TABLET, FILM COATED ORAL
Qty: 180 TABLET | Refills: 3 | Status: SHIPPED | OUTPATIENT
Start: 2022-05-26

## 2022-05-31 ENCOUNTER — DOCUMENTATION (OUTPATIENT)
Dept: FAMILY MEDICINE CLINIC | Facility: CLINIC | Age: 82
End: 2022-05-31

## 2022-05-31 ENCOUNTER — LAB (OUTPATIENT)
Dept: LAB | Facility: OTHER | Age: 82
End: 2022-05-31

## 2022-05-31 DIAGNOSIS — E11.8 TYPE 2 DIABETES MELLITUS WITH COMPLICATION, WITHOUT LONG-TERM CURRENT USE OF INSULIN: Chronic | ICD-10-CM

## 2022-05-31 DIAGNOSIS — Z12.5 SCREENING FOR PROSTATE CANCER: ICD-10-CM

## 2022-05-31 DIAGNOSIS — E78.2 MIXED HYPERLIPIDEMIA: Chronic | ICD-10-CM

## 2022-05-31 DIAGNOSIS — I10 ESSENTIAL HYPERTENSION: ICD-10-CM

## 2022-05-31 DIAGNOSIS — E03.9 ACQUIRED HYPOTHYROIDISM: Chronic | ICD-10-CM

## 2022-05-31 LAB
ALBUMIN SERPL-MCNC: 4.1 G/DL (ref 3.5–5)
ALBUMIN/GLOB SERPL: 1.5 G/DL (ref 1.1–1.8)
ALP SERPL-CCNC: 60 U/L (ref 38–126)
ALT SERPL W P-5'-P-CCNC: 15 U/L
ANION GAP SERPL CALCULATED.3IONS-SCNC: 8 MMOL/L (ref 5–15)
AST SERPL-CCNC: 23 U/L (ref 17–59)
BASOPHILS # BLD AUTO: 0.04 10*3/MM3 (ref 0–0.2)
BASOPHILS NFR BLD AUTO: 0.6 % (ref 0–1.5)
BILIRUB SERPL-MCNC: 1.2 MG/DL (ref 0.2–1.3)
BILIRUB UR QL STRIP: NEGATIVE
BUN SERPL-MCNC: 20 MG/DL (ref 7–23)
BUN/CREAT SERPL: 15.4 (ref 7–25)
CALCIUM SPEC-SCNC: 9.1 MG/DL (ref 8.4–10.2)
CHLORIDE SERPL-SCNC: 106 MMOL/L (ref 101–112)
CHOLEST SERPL-MCNC: 127 MG/DL (ref 150–200)
CLARITY UR: CLEAR
CO2 SERPL-SCNC: 26 MMOL/L (ref 22–30)
COLOR UR: YELLOW
CREAT SERPL-MCNC: 1.3 MG/DL (ref 0.7–1.3)
DEPRECATED RDW RBC AUTO: 47.9 FL (ref 37–54)
EGFRCR SERPLBLD CKD-EPI 2021: 55.5 ML/MIN/1.73
EOSINOPHIL # BLD AUTO: 0.18 10*3/MM3 (ref 0–0.4)
EOSINOPHIL NFR BLD AUTO: 2.8 % (ref 0.3–6.2)
ERYTHROCYTE [DISTWIDTH] IN BLOOD BY AUTOMATED COUNT: 15.2 % (ref 12.3–15.4)
GLOBULIN UR ELPH-MCNC: 2.7 GM/DL (ref 2.3–3.5)
GLUCOSE SERPL-MCNC: 153 MG/DL (ref 70–99)
GLUCOSE UR STRIP-MCNC: NEGATIVE MG/DL
HCT VFR BLD AUTO: 44.7 % (ref 37.5–51)
HDLC SERPL-MCNC: 34 MG/DL (ref 40–59)
HGB BLD-MCNC: 14.4 G/DL (ref 13–17.7)
HGB UR QL STRIP.AUTO: NEGATIVE
KETONES UR QL STRIP: NEGATIVE
LDLC SERPL CALC-MCNC: 71 MG/DL
LDLC/HDLC SERPL: 2.03 {RATIO} (ref 0–3.55)
LEUKOCYTE ESTERASE UR QL STRIP.AUTO: NEGATIVE
LYMPHOCYTES # BLD AUTO: 1.59 10*3/MM3 (ref 0.7–3.1)
LYMPHOCYTES NFR BLD AUTO: 24.5 % (ref 19.6–45.3)
MCH RBC QN AUTO: 27.9 PG (ref 26.6–33)
MCHC RBC AUTO-ENTMCNC: 32.2 G/DL (ref 31.5–35.7)
MCV RBC AUTO: 86.6 FL (ref 79–97)
MONOCYTES # BLD AUTO: 0.87 10*3/MM3 (ref 0.1–0.9)
MONOCYTES NFR BLD AUTO: 13.4 % (ref 5–12)
NEUTROPHILS NFR BLD AUTO: 3.8 10*3/MM3 (ref 1.7–7)
NEUTROPHILS NFR BLD AUTO: 58.7 % (ref 42.7–76)
NITRITE UR QL STRIP: NEGATIVE
PH UR STRIP.AUTO: 7 [PH] (ref 5.5–8)
PLATELET # BLD AUTO: 190 10*3/MM3 (ref 140–450)
PMV BLD AUTO: 10.9 FL (ref 6–12)
POTASSIUM SERPL-SCNC: 4.7 MMOL/L (ref 3.4–5)
PROT SERPL-MCNC: 6.8 G/DL (ref 6.3–8.6)
PROT UR QL STRIP: NEGATIVE
RBC # BLD AUTO: 5.16 10*6/MM3 (ref 4.14–5.8)
SODIUM SERPL-SCNC: 140 MMOL/L (ref 137–145)
SP GR UR STRIP: 1.01 (ref 1–1.03)
TRIGL SERPL-MCNC: 120 MG/DL
UROBILINOGEN UR QL STRIP: NORMAL
VLDLC SERPL-MCNC: 22 MG/DL (ref 5–40)
WBC NRBC COR # BLD: 6.48 10*3/MM3 (ref 3.4–10.8)

## 2022-05-31 PROCEDURE — 80061 LIPID PANEL: CPT | Performed by: INTERNAL MEDICINE

## 2022-05-31 PROCEDURE — 80053 COMPREHEN METABOLIC PANEL: CPT | Performed by: INTERNAL MEDICINE

## 2022-05-31 PROCEDURE — 81003 URINALYSIS AUTO W/O SCOPE: CPT | Performed by: INTERNAL MEDICINE

## 2022-05-31 PROCEDURE — 83036 HEMOGLOBIN GLYCOSYLATED A1C: CPT | Performed by: INTERNAL MEDICINE

## 2022-05-31 PROCEDURE — 85025 COMPLETE CBC W/AUTO DIFF WBC: CPT | Performed by: INTERNAL MEDICINE

## 2022-05-31 PROCEDURE — 82570 ASSAY OF URINE CREATININE: CPT | Performed by: INTERNAL MEDICINE

## 2022-05-31 PROCEDURE — 84443 ASSAY THYROID STIM HORMONE: CPT | Performed by: INTERNAL MEDICINE

## 2022-05-31 PROCEDURE — G0103 PSA SCREENING: HCPCS | Performed by: INTERNAL MEDICINE

## 2022-05-31 PROCEDURE — 82043 UR ALBUMIN QUANTITATIVE: CPT | Performed by: INTERNAL MEDICINE

## 2022-05-31 PROCEDURE — 36415 COLL VENOUS BLD VENIPUNCTURE: CPT | Performed by: INTERNAL MEDICINE

## 2022-05-31 PROCEDURE — 84439 ASSAY OF FREE THYROXINE: CPT | Performed by: INTERNAL MEDICINE

## 2022-05-31 NOTE — PROGRESS NOTES
The lab called and asked for confirmation on the pending lab orders entered 11/12/2021. I reviewed the patient's chart and confirmed 6 month labs were due and asked them to proceed.

## 2022-06-01 LAB
ALBUMIN UR-MCNC: 1.6 MG/DL
CREAT UR-MCNC: 74.3 MG/DL
HBA1C MFR BLD: 6.5 % (ref 4.8–5.6)
MICROALBUMIN/CREAT UR: 21.5 MG/G
PSA SERPL-MCNC: 1.55 NG/ML (ref 0–4)
T4 FREE SERPL-MCNC: 1.44 NG/DL (ref 0.93–1.7)
TSH SERPL DL<=0.05 MIU/L-ACNC: 1.96 UIU/ML (ref 0.27–4.2)

## 2022-06-06 DIAGNOSIS — E11.8 TYPE 2 DIABETES MELLITUS WITH COMPLICATION, WITHOUT LONG-TERM CURRENT USE OF INSULIN: Primary | ICD-10-CM

## 2022-06-06 DIAGNOSIS — E11.8 TYPE 2 DIABETES MELLITUS WITH COMPLICATION, WITHOUT LONG-TERM CURRENT USE OF INSULIN: ICD-10-CM

## 2022-06-06 RX ORDER — CALCIUM CITRATE/VITAMIN D3 200MG-6.25
TABLET ORAL
Qty: 50 EACH | Refills: 5 | Status: SHIPPED | OUTPATIENT
Start: 2022-06-06

## 2022-06-06 RX ORDER — GLUCOSAM/CHON-MSM1/C/MANG/BOSW 500-416.6
TABLET ORAL
Qty: 100 EACH | Refills: 3 | Status: SHIPPED | OUTPATIENT
Start: 2022-06-06

## 2022-06-09 PROBLEM — I50.22 CHRONIC SYSTOLIC CONGESTIVE HEART FAILURE (HCC): Status: ACTIVE | Noted: 2022-01-24

## 2022-06-09 PROBLEM — I50.22 CHRONIC SYSTOLIC CONGESTIVE HEART FAILURE (HCC): Chronic | Status: ACTIVE | Noted: 2022-01-24

## 2022-06-09 RX ORDER — OXYBUTYNIN CHLORIDE 10 MG/1
10 TABLET, EXTENDED RELEASE ORAL
COMMUNITY
Start: 2022-05-09 | End: 2022-06-10

## 2022-06-09 NOTE — PROGRESS NOTES
Chief Complaint   Patient presents with   • Medicare Wellness-subsequent   • Hyperlipidemia   • Leg Pain   • Chronic systolic congestive heart failure    • Type 2 diabetes mellitus with complication, without long-te   • Hypothyroidism   • Gastroesophageal reflux disease without esophagitis   • Benign non-nodular prostatic hyperplasia with lower urinary   • Primary osteoarthritis involving multiple joints   • Chronic renal impairment, stage 3a      Subjective   Darwin Fraga is a 82 y.o. male who presents to the office for follow-up and review of labs.  He has diabetes, and his blood sugar has been well controlled.  He has been compliant with his diabetic medications and diet.  He has continued to have several episodes of hypoglycemia.  He has hypertension and his blood pressure has been controlled.  He has hypothyroidism and takes Synthroid, 25 mcg daily.  He has osteoarthritis and takes an over-the-counter NSAID as needed.  He uses this sparingly due to renal insufficiency.  He has chronic renal impairment, stage IIIb.  His baseline creatinine runs around 1.4-1.5.  He has BPH and takes Flomax 0.4 mg daily.  His urinary symptoms are currently well managed.  He has GERD and takes Protonix 40 mg daily.  He has diabetic peripheral neuropathy, but does not require medication for this.  He takes gabapentin 100 mg 3 times daily for treatment of sciatic pain.  He has coronary artery disease and hypertensive heart disease with heart failure.  He also has complete AV block.  He follows with cardiology for management of these, and they have been baseline.    He is supposed to be taking Farxiga to help with treatment of his congestive heart failure.  He has not started this medication because of his blood sugar running low.    He was admitted to [facility] on 5/18/2022 with acute bilateral obstructive uropathy.  He had cystoscopy, lithotripsy and stent placement.  He was started on oxybutynin  "XL 10 mg nightly for short term treatment, but he has now discontinued this medication.  He continues to follow with urology    History of Present Illness has been reviewed and validated on 06/10/2022 and updated with any changes.    The following portions of the patient's history were reviewed and updated as appropriate: allergies, current medications, past family history, past medical history, past social history, past surgical history and problem list.    Review of Systems   Constitutional: Negative for chills, fatigue and fever.   HENT: Negative for congestion, sneezing, sore throat and trouble swallowing.    Eyes: Negative for visual disturbance.   Respiratory: Negative for cough, chest tightness, shortness of breath and wheezing.    Cardiovascular: Negative for chest pain, palpitations and leg swelling.   Gastrointestinal: Negative for abdominal pain, constipation, diarrhea, nausea and vomiting.   Genitourinary: Negative for dysuria, frequency and urgency.   Musculoskeletal: Negative for neck pain.   Skin: Negative for rash.   Neurological: Negative for dizziness, weakness and headaches.   Psychiatric/Behavioral:        Patient denies any feelings of depression and has not felt down, hopeless or lost interest in any activities.   All other systems reviewed and are negative.  Review of systems has been reviewed and validated on 06/10/2022 and updated with any changes.    Objective    Vitals:    06/10/22 0929   BP: 120/60   BP Location: Left arm   Patient Position: Sitting   Cuff Size: Large Adult   Pulse: 80  Comment: regular   Temp: 98 °F (36.7 °C)   TempSrc: Temporal   SpO2: 98%   Weight: 83.9 kg (185 lb)   Height: 180.3 cm (71\")   PainSc: 0-No pain      Body mass index is 25.8 kg/m².      Physical Exam   Constitutional: He is oriented to person, place, and time. He appears well-developed. No distress.   HENT:   Head: Normocephalic and atraumatic.   Nose: Nose normal.   Eyes: Pupils are equal, round, and " reactive to light. Conjunctivae are normal. No scleral icterus.   Cardiovascular: Normal rate, regular rhythm and normal heart sounds. Exam reveals no gallop and no friction rub.   No murmur heard.  Pulmonary/Chest: Effort normal and breath sounds normal. No respiratory distress. He has no wheezes. He has no rales.   Musculoskeletal: Normal range of motion.   Neurological: He is alert and oriented to person, place, and time. No cranial nerve deficit.   Skin: Skin is warm and dry. No rash noted.   Psychiatric: His behavior is normal. Judgment and thought content normal.   Nursing note and vitals reviewed.  Physical exam has been reviewed and validated on 06/10/2022 and updated with any changes.    Assessment & Plan             Diagnoses and all orders for this visit:    1. Medicare annual wellness visit, subsequent (Primary)    2. Type 2 diabetes mellitus with complication, without long-term current use of insulin (HCC)  -     Hemoglobin A1c; Future  -     metFORMIN ER (GLUCOPHAGE-XR) 500 MG 24 hr tablet; Take 1 tablet by mouth 2 (Two) Times a Day.  Dispense: 180 tablet; Refill: 1    3. Essential hypertension  -     CBC & Differential; Future  -     Comprehensive Metabolic Panel; Future  -     Urinalysis With Culture If Indicated -; Future    4. Mixed hyperlipidemia  -     LDL Cholesterol, Direct; Future    5. Hypertensive heart disease with heart failure (HCC)    6. Atrioventricular block, complete ([facility])    7. Acquired hypothyroidism  -     T4, Free; Future  -     TSH; Future    8. Chronic renal impairment, stage 3a ([facility])    9. Gastroesophageal reflux disease without esophagitis    10. Benign non-nodular prostatic hyperplasia with lower urinary tract symptoms         Labs are reviewed with patient.  His glucose is 153.  His hemoglobin A1c is 6.50.  His diabetes is well controlled.  Since he is having hypoglycemic episodes, I will discontinue glimepiride.  I have instructed him to start taking Farxiga 10 mg daily, as  this will help with his diabetes and heart failure.  He will discontinue Janumet.  I will start metformin ER, 500 mg twice daily.  He will let me know if he continues to have episodes of hypoglycemia.  He may monitor blood sugar 1 time daily.  His creatinine is baseline at 1.30.  His total cholesterol is 127, LDL 71 and triglycerides 120.  His HDL is 34.  He will continue with pravastatin 40 mg daily for treatment of hyperlipidemia. His TSH is normal at 1.960.  He will continue with Synthroid 25 mcg daily for treatment of hypothyroidism.  His PSA is normal at 1.550.  He will continue with Flomax 0.4 mg daily for treatment of BPH.  He will continue with Protonix 40 mg daily for treatment of GERD.  His blood pressure is controlled, and he will continue with his current blood pressure medication.  He will continue to follow with cardiology for management of the coronary artery disease and hypertensive heart disease with heart failure.     Patient understands the risks associated with this controlled medication, including tolerance and addiction.  Patient also agrees to only obtain this medication from me, and not from a another provider, unless that provider is covering for me in my absence.  Patient also agrees to be compliant in dosing, and not self adjust the dose of medication.  A signed controlled substance agreement is on file, and the patient has received a controlled substance education sheet at this or a previous visit.  The patient has also signed a consent for treatment with a controlled substance as per [facility] policy. FAMILIA is obtained.    PHQ-2/PHQ-9 Depression Screening 6/10/2022   Retired PHQ-9 Total Score -   Retired Total Score -   Little Interest or Pleasure in Doing Things 0-->not at all   Feeling Down, Depressed or Hopeless 0-->not at all   PHQ-9: Brief Depression Severity Measure Score 0       Lab on 05/31/2022   Component Date Value Ref Range Status   • Glucose 05/31/2022 153 (A) 70 - 99  mg/dL Final   • BUN 05/31/2022 20  7 - 23 mg/dL Final   • Creatinine 05/31/2022 1.30  0.70 - 1.30 mg/dL Final   • Sodium 05/31/2022 140  137 - 145 mmol/L Final   • Potassium 05/31/2022 4.7  3.4 - 5.0 mmol/L Final   • Chloride 05/31/2022 106  101 - 112 mmol/L Final   • CO2 05/31/2022 26.0  22.0 - 30.0 mmol/L Final   • Calcium 05/31/2022 9.1  8.4 - 10.2 mg/dL Final   • Total Protein 05/31/2022 6.8  6.3 - 8.6 g/dL Final   • Albumin 05/31/2022 4.10  3.50 - 5.00 g/dL Final   • ALT (SGPT) 05/31/2022 15  <=50 U/L Final   • AST (SGOT) 05/31/2022 23  17 - 59 U/L Final   • Alkaline Phosphatase 05/31/2022 60  38 - 126 U/L Final   • Total Bilirubin 05/31/2022 1.2  0.2 - 1.3 mg/dL Final   • Globulin 05/31/2022 2.7  2.3 - 3.5 gm/dL Final   • A/G Ratio 05/31/2022 1.5  1.1 - 1.8 g/dL Final   • BUN/Creatinine Ratio 05/31/2022 15.4  7.0 - 25.0 Final   • Anion Gap 05/31/2022 8.0  5.0 - 15.0 mmol/L Final   • eGFR 05/31/2022 55.5 (A) >60.0 mL/min/1.73 Final    National Kidney Foundation and American Society of Nephrology (ASN) Task Force recommended calculation based on the Chronic Kidney Disease Epidemiology Collaboration (CKD-EPI) equation refit without adjustment for race.   • Free T4 05/31/2022 1.44  0.93 - 1.70 ng/dL Final   • TSH 05/31/2022 1.960  0.270 - 4.200 uIU/mL Final   • Color, UA 05/31/2022 Yellow  Yellow, Straw Final   • Appearance, UA 05/31/2022 Clear  Clear Final   • pH, UA 05/31/2022 7.0  5.5 - 8.0 Final   • Specific Gravity, UA 05/31/2022 1.015  1.005 - 1.030 Final   • Glucose, UA 05/31/2022 Negative  Negative Final   • Ketones, UA 05/31/2022 Negative  Negative Final   • Bilirubin, UA 05/31/2022 Negative  Negative Final   • Blood, UA 05/31/2022 Negative  Negative Final   • Protein, UA 05/31/2022 Negative  Negative Final   • Leuk Esterase, UA 05/31/2022 Negative  Negative Final   • Nitrite, UA 05/31/2022 Negative  Negative Final   • Urobilinogen, UA 05/31/2022 0.2 E.U./dL  0.2 - 1.0 E.U./dL Final   • Hemoglobin A1C  05/31/2022 6.50 (A) 4.80 - 5.60 % Final   • Total Cholesterol 05/31/2022 127 (A) 150 - 200 mg/dL Final   • Triglycerides 05/31/2022 120  <=150 mg/dL Final   • HDL Cholesterol 05/31/2022 34 (A) 40 - 59 mg/dL Final   • LDL Cholesterol  05/31/2022 71  <=100 mg/dL Final   • VLDL Cholesterol 05/31/2022 22  5 - 40 mg/dL Final   • LDL/HDL Ratio 05/31/2022 2.03  0.00 - 3.55 Final   • Microalbumin/Creatinine Ratio 05/31/2022 21.5  mg/g Final   • Creatinine, Urine 05/31/2022 74.3  mg/dL Final   • Microalbumin, Urine 05/31/2022 1.6  mg/dL Final   • PSA 05/31/2022 1.550  0.000 - 4.000 ng/mL Final   • WBC 05/31/2022 6.48  3.40 - 10.80 10*3/mm3 Final   • RBC 05/31/2022 5.16  4.14 - 5.80 10*6/mm3 Final   • Hemoglobin 05/31/2022 14.4  13.0 - 17.7 g/dL Final   • Hematocrit 05/31/2022 44.7  37.5 - 51.0 % Final   • MCV 05/31/2022 86.6  79.0 - 97.0 fL Final   • MCH 05/31/2022 27.9  26.6 - 33.0 pg Final   • MCHC 05/31/2022 32.2  31.5 - 35.7 g/dL Final   • RDW 05/31/2022 15.2  12.3 - 15.4 % Final   • RDW-SD 05/31/2022 47.9  37.0 - 54.0 fl Final   • MPV 05/31/2022 10.9  6.0 - 12.0 fL Final   • Platelets 05/31/2022 190  140 - 450 10*3/mm3 Final   • Neutrophil % 05/31/2022 58.7  42.7 - 76.0 % Final   • Lymphocyte % 05/31/2022 24.5  19.6 - 45.3 % Final   • Monocyte % 05/31/2022 13.4 (A) 5.0 - 12.0 % Final   • Eosinophil % 05/31/2022 2.8  0.3 - 6.2 % Final   • Basophil % 05/31/2022 0.6  0.0 - 1.5 % Final   • Neutrophils, Absolute 05/31/2022 3.80  1.70 - 7.00 10*3/mm3 Final   • Lymphocytes, Absolute 05/31/2022 1.59  0.70 - 3.10 10*3/mm3 Final   • Monocytes, Absolute 05/31/2022 0.87  0.10 - 0.90 10*3/mm3 Final   • Eosinophils, Absolute 05/31/2022 0.18  0.00 - 0.40 10*3/mm3 Final   • Basophils, Absolute 05/31/2022 0.04  0.00 - 0.20 10*3/mm3 Final   Admission on 05/18/2022, Discharged on 05/18/2022   Component Date Value Ref Range Status   • Color,  05/18/2022 Yellow  Yellow, Straw, Dark Yellow, Esperanza Final   • Appearance,  05/18/2022 Cloudy  (A) Clear Final   • pH, UA 05/18/2022 5.5  5.0 - 9.0 Final   • Specific Gravity, UA 05/18/2022 1.016  1.003 - 1.030 Final   • Glucose, UA 05/18/2022 Negative  Negative Final   • Ketones, UA 05/18/2022 Trace (A) Negative Final   • Bilirubin, UA 05/18/2022 Negative  Negative Final   • Blood, UA 05/18/2022 Large (3+) (A) Negative Final   • Protein, UA 05/18/2022 100 mg/dL (2+) (A) Negative Final   • Leuk Esterase, UA 05/18/2022 Large (3+) (A) Negative Final   • Nitrite, UA 05/18/2022 Negative  Negative Final   • Urobilinogen, UA 05/18/2022 1.0 E.U./dL  0.2 - 1.0 E.U./dL Final   • Glucose 05/18/2022 139 (A) 70 - 130 mg/dL Final    : [id number] DOYLE ABBYMeter ID: [id number]   • Stone Source 05/18/2022 Comment   Final    Left Ureter   • Color 05/18/2022 Brown   Final   • Size 05/18/2022 4x4  mm Final    Single piece received.   • Stone Weight 05/18/2022 27  mg Final   • Composition 05/18/2022 Comment   Final    Percentage (Represents the % composition)   • Ca Oxalate - Monohydrate, Stone 05/18/2022 90  % Final   • Ca Oxalate-Dihydrate, Stone 05/18/2022 10  % Final   • Photo 05/18/2022 Comment   Final    Photograph will follow under a separate cover   • Comments: 05/18/2022 Comment   Final    Physician questions regarding [facility] Analysis contact  [facility] at: [phone].   • Please note 05/18/2022 Comment   Final    Calculi report will follow via computer, mail or   delivery.   • Disclaimer: 05/18/2022 Comment   Final    This test was developed and its performance characteristics  determined by [facility].  It has not been cleared or approved  by the Food and Drug Administration.   • Reference Lab Report 05/18/2022    Final                    Value:[facility]  [address] [place] [address]  Phone: [phone] or [phone]  Fax: [phone]  Marcos Terrell M.D., Medical Director    PATIENT NAME                           LABORATORY NO.  [id number]  BELINDA BENNETT.      "              [id number]  [id number]                         AGE              SEX  SSN           CL[facility]  [facility]           82      1940      [id number]   [id number]    [facility]                       REQUESTING M.D.     ATTENDING M.D.     COPY TO.  [address] [place] [address]                 FELLOWS, [place] [address]             DATE COLLECTED      DATE RECEIVED      DATE REPORTED  2022    DIAGNOSIS:  HARDWARE:  Ureteral stents, left and right (gross exam only)    CLINICAL HISTORY:  Kidney stone    SPECIMENS RECEIVED:  HARDWARE    MICROSCOPIC DESCRIPTION:    Professional interpretation rendered by Marcos Terrell M.D.,                           CHRISTOPHE at  [facility], [address] [place] [address].    GROSS DESCRIPTION:  The specimen is received in one formalin filled container labeled as \"old stent,  left and right ureter\" and consists of two 35.0 x 0.3 cm cylindrical, curved pieces  of white to blue rubbery tubing consistent with ureteral stents.  Both stents  display irregularly spaced, minute fenestrations.  No soft tissue is attached.  The  specimen is for gross examination only; no histologic sections are submitted.  MF    REVIEWED, DIAGNOSED AND ELECTRONICALLY  SIGNED BY:    Marcos Terrell M.D., F.C.A.P.  CPT CODES:  41006     • Glucose 2022 110  70 - 130 mg/dL Final    : [id number] [facility] ID: [id number]   Admission on 2022, Discharged on 2022   Component Date Value Ref Range Status   • Glucose 2022 144 (A) 65 - 99 mg/dL Final   • BUN 2022 27 (A) 8 - 23 mg/dL Final   • Creatinine 2022 2.40 (A) 0.76 - 1.27 mg/dL Final   • Sodium 2022 139  136 - 145 mmol/L Final   • Potassium 2022 4.4  3.5 - 5.2 mmol/L Final   • Chloride 2022 103  98 - 107 mmol/L Final   • CO2 2022 22.0  22.0 - 29.0 mmol/L Final   • " Calcium 05/03/2022 9.3  8.6 - 10.5 mg/dL Final   • Total Protein 05/03/2022 7.0  6.0 - 8.5 g/dL Final   • Albumin 05/03/2022 4.20  3.50 - 5.20 g/dL Final   • ALT (SGPT) 05/03/2022 14  1 - 41 U/L Final   • AST (SGOT) 05/03/2022 17  1 - 40 U/L Final   • Alkaline Phosphatase 05/03/2022 67  39 - 117 U/L Final   • Total Bilirubin 05/03/2022 0.9  0.0 - 1.2 mg/dL Final   • Globulin 05/03/2022 2.8  gm/dL Final   • A/G Ratio 05/03/2022 1.5  g/dL Final   • BUN/Creatinine Ratio 05/03/2022 11.3  7.0 - 25.0 Final   • Anion Gap 05/03/2022 14.0  5.0 - 15.0 mmol/L Final   • eGFR 05/03/2022 26.6 (A) >60.0 mL/min/1.73 Final    National Kidney Foundation and American Society of Nephrology (ASN) Task Force recommended calculation based on the Chronic Kidney Disease Epidemiology Collaboration (CKD-EPI) equation refit without adjustment for race.   • Lipase 05/03/2022 30  13 - 60 U/L Final   • Color, UA 05/03/2022 Yellow  Yellow, Straw, Dark Yellow, Esperanza Final   • Appearance, UA 05/03/2022 Clear  Clear Final   • pH, UA 05/03/2022 7.0  5.0 - 9.0 Final   • Specific Gravity, UA 05/03/2022 1.010  1.003 - 1.030 Final   • Glucose, UA 05/03/2022 Negative  Negative Final   • Ketones, UA 05/03/2022 Negative  Negative Final   • Bilirubin, UA 05/03/2022 Negative  Negative Final   • Blood, UA 05/03/2022 Negative  Negative Final   • Protein, UA 05/03/2022 Negative  Negative Final   • Leuk Esterase, UA 05/03/2022 Negative  Negative Final   • Nitrite, UA 05/03/2022 Negative  Negative Final   • Urobilinogen, UA 05/03/2022 0.2 E.U./dL  0.2 - 1.0 E.U./dL Final   • Extra Tube 05/03/2022 Hold for add-ons.   Final    Auto resulted.   • Extra Tube 05/03/2022 hold for add-on   Final    Auto resulted   • Extra Tube 05/03/2022 Hold for add-ons.   Final    Auto resulted.   • Extra Tube 05/03/2022 hold for add-on   Final    Auto resulted   • WBC 05/03/2022 10.83 (A) 3.40 - 10.80 10*3/mm3 Final   • RBC 05/03/2022 5.30  4.14 - 5.80 10*6/mm3 Final   • Hemoglobin  05/03/2022 14.6  13.0 - 17.7 g/dL Final   • Hematocrit 05/03/2022 44.8  37.5 - 51.0 % Final   • MCV 05/03/2022 84.5  79.0 - 97.0 fL Final   • MCH 05/03/2022 27.5  26.6 - 33.0 pg Final   • MCHC 05/03/2022 32.6  31.5 - 35.7 g/dL Final   • RDW 05/03/2022 14.5  12.3 - 15.4 % Final   • RDW-SD 05/03/2022 44.0  37.0 - 54.0 fl Final   • MPV 05/03/2022 11.2  6.0 - 12.0 fL Final   • Platelets 05/03/2022 200  140 - 450 10*3/mm3 Final   • Neutrophil % 05/03/2022 73.8  42.7 - 76.0 % Final   • Lymphocyte % 05/03/2022 12.8 (A) 19.6 - 45.3 % Final   • Monocyte % 05/03/2022 11.4  5.0 - 12.0 % Final   • Eosinophil % 05/03/2022 1.1  0.3 - 6.2 % Final   • Basophil % 05/03/2022 0.5  0.0 - 1.5 % Final   • Immature Grans % 05/03/2022 0.4  0.0 - 0.5 % Final   • Neutrophils, Absolute 05/03/2022 7.99 (A) 1.70 - 7.00 10*3/mm3 Final   • Lymphocytes, Absolute 05/03/2022 1.39  0.70 - 3.10 10*3/mm3 Final   • Monocytes, Absolute 05/03/2022 1.24 (A) 0.10 - 0.90 10*3/mm3 Final   • Eosinophils, Absolute 05/03/2022 0.12  0.00 - 0.40 10*3/mm3 Final   • Basophils, Absolute 05/03/2022 0.05  0.00 - 0.20 10*3/mm3 Final   • Immature Grans, Absolute 05/03/2022 0.04  0.00 - 0.05 10*3/mm3 Final   • nRBC 05/03/2022 0.0  0.0 - 0.2 /100 WBC Final   • QT Interval 05/03/2022 414  ms Final   • QTC Interval 05/03/2022 474  ms Final   • COVID19 05/03/2022 Not Detected  Not Detected - Ref. Range Final   • Influenza A PCR 05/03/2022 Not Detected  Not Detected Final   • Influenza B PCR 05/03/2022 Not Detected  Not Detected Final   • Glucose 05/04/2022 100 (A) 65 - 99 mg/dL Final   • BUN 05/04/2022 23  8 - 23 mg/dL Final   • Creatinine 05/04/2022 2.16 (A) 0.76 - 1.27 mg/dL Final   • Sodium 05/04/2022 141  136 - 145 mmol/L Final   • Potassium 05/04/2022 4.0  3.5 - 5.2 mmol/L Final   • Chloride 05/04/2022 110 (A) 98 - 107 mmol/L Final   • CO2 05/04/2022 21.0 (A) 22.0 - 29.0 mmol/L Final   • Calcium 05/04/2022 8.5 (A) 8.6 - 10.5 mg/dL Final   • BUN/Creatinine Ratio  05/04/2022 10.6  7.0 - 25.0 Final   • Anion Gap 05/04/2022 10.0  5.0 - 15.0 mmol/L Final   • eGFR 05/04/2022 30.2 (A) >60.0 mL/min/1.73 Final    National Kidney Foundation and American Society of Nephrology (ASN) Task Force recommended calculation based on the Chronic Kidney Disease Epidemiology Collaboration (CKD-EPI) equation refit without adjustment for race.   • WBC 05/04/2022 7.50  3.40 - 10.80 10*3/mm3 Final   • RBC 05/04/2022 4.49  4.14 - 5.80 10*6/mm3 Final   • Hemoglobin 05/04/2022 12.5 (A) 13.0 - 17.7 g/dL Final   • Hematocrit 05/04/2022 37.9  37.5 - 51.0 % Final   • MCV 05/04/2022 84.4  79.0 - 97.0 fL Final   • MCH 05/04/2022 27.8  26.6 - 33.0 pg Final   • MCHC 05/04/2022 33.0  31.5 - 35.7 g/dL Final   • RDW 05/04/2022 14.3  12.3 - 15.4 % Final   • RDW-SD 05/04/2022 44.1  37.0 - 54.0 fl Final   • MPV 05/04/2022 11.3  6.0 - 12.0 fL Final   • Platelets 05/04/2022 170  140 - 450 10*3/mm3 Final   • Neutrophil % 05/04/2022 58.6  42.7 - 76.0 % Final   • Lymphocyte % 05/04/2022 23.3  19.6 - 45.3 % Final   • Monocyte % 05/04/2022 15.1 (A) 5.0 - 12.0 % Final   • Eosinophil % 05/04/2022 2.3  0.3 - 6.2 % Final   • Basophil % 05/04/2022 0.4  0.0 - 1.5 % Final   • Immature Grans % 05/04/2022 0.3  0.0 - 0.5 % Final   • Neutrophils, Absolute 05/04/2022 4.40  1.70 - 7.00 10*3/mm3 Final   • Lymphocytes, Absolute 05/04/2022 1.75  0.70 - 3.10 10*3/mm3 Final   • Monocytes, Absolute 05/04/2022 1.13 (A) 0.10 - 0.90 10*3/mm3 Final   • Eosinophils, Absolute 05/04/2022 0.17  0.00 - 0.40 10*3/mm3 Final   • Basophils, Absolute 05/04/2022 0.03  0.00 - 0.20 10*3/mm3 Final   • Immature Grans, Absolute 05/04/2022 0.02  0.00 - 0.05 10*3/mm3 Final   • nRBC 05/04/2022 0.0  0.0 - 0.2 /100 WBC Final   • Glucose 05/04/2022 85  70 - 130 mg/dL Final    RN NotifiedOperator: [id number] ARYA Lui ID: [id number]   • Glucose 05/04/2022 93  70 - 130 mg/dL Final    RN NotifiedOperator: [id number] CAMERON Hawley ID: [id number]   •  Glucose 05/04/2022 73  70 - 130 mg/dL Final    RN NotifiedOperator: [id number] CAMERON OLVERAMetjabari ID: [id number]   • Glucose 05/04/2022 86  70 - 130 mg/dL Final    : [id number] PALLAVI SOSAMetjabari ID: [id number]   • Glucose 05/05/2022 243 (A) 65 - 99 mg/dL Final   • BUN 05/05/2022 17  8 - 23 mg/dL Final   • Creatinine 05/05/2022 1.37 (A) 0.76 - 1.27 mg/dL Final   • Sodium 05/05/2022 136  136 - 145 mmol/L Final   • Potassium 05/05/2022 3.9  3.5 - 5.2 mmol/L Final   • Chloride 05/05/2022 104  98 - 107 mmol/L Final   • CO2 05/05/2022 19.0 (A) 22.0 - 29.0 mmol/L Final   • Calcium 05/05/2022 9.3  8.6 - 10.5 mg/dL Final   • BUN/Creatinine Ratio 05/05/2022 12.4  7.0 - 25.0 Final   • Anion Gap 05/05/2022 13.0  5.0 - 15.0 mmol/L Final   • eGFR 05/05/2022 52.1 (A) >60.0 mL/min/1.73 Final    National Kidney Foundation and American Society of Nephrology (ASN) Task Force recommended calculation based on the Chronic Kidney Disease Epidemiology Collaboration (CKD-EPI) equation refit without adjustment for race.   • WBC 05/05/2022 8.69  3.40 - 10.80 10*3/mm3 Final   • RBC 05/05/2022 5.00  4.14 - 5.80 10*6/mm3 Final   • Hemoglobin 05/05/2022 14.0  13.0 - 17.7 g/dL Final   • Hematocrit 05/05/2022 42.7  37.5 - 51.0 % Final   • MCV 05/05/2022 85.4  79.0 - 97.0 fL Final   • MCH 05/05/2022 28.0  26.6 - 33.0 pg Final   • MCHC 05/05/2022 32.8  31.5 - 35.7 g/dL Final   • RDW 05/05/2022 14.4  12.3 - 15.4 % Final   • RDW-SD 05/05/2022 44.9  37.0 - 54.0 fl Final   • MPV 05/05/2022 11.4  6.0 - 12.0 fL Final   • Platelets 05/05/2022 190  140 - 450 10*3/mm3 Final   • Neutrophil % 05/05/2022 73.1  42.7 - 76.0 % Final   • Lymphocyte % 05/05/2022 12.9 (A) 19.6 - 45.3 % Final   • Monocyte % 05/05/2022 12.7 (A) 5.0 - 12.0 % Final   • Eosinophil % 05/05/2022 0.2 (A) 0.3 - 6.2 % Final   • Basophil % 05/05/2022 0.5  0.0 - 1.5 % Final   • Immature Grans % 05/05/2022 0.6 (A) 0.0 - 0.5 % Final   • Neutrophils, Absolute 05/05/2022 6.36  1.70 -  7.00 10*3/mm3 Final   • Lymphocytes, Absolute 05/05/2022 1.12  0.70 - 3.10 10*3/mm3 Final   • Monocytes, Absolute 05/05/2022 1.10 (A) 0.10 - 0.90 10*3/mm3 Final   • Eosinophils, Absolute 05/05/2022 0.02  0.00 - 0.40 10*3/mm3 Final   • Basophils, Absolute 05/05/2022 0.04  0.00 - 0.20 10*3/mm3 Final   • Immature Grans, Absolute 05/05/2022 0.05  0.00 - 0.05 10*3/mm3 Final   • nRBC 05/05/2022 0.0  0.0 - 0.2 /100 WBC Final   • Glucose 05/04/2022 168 (A) 70 - 130 mg/dL Final    RN NotifiedOperator: [id number] ARYA MARRAMeter ID: [id number]   • Glucose 05/05/2022 198 (A) 70 - 130 mg/dL Final    RN NotifiedOperator: [id number] [facility] [facility]: [id number]   • Glucose 05/05/2022 200 (A) 70 - 130 mg/dL Final    RN NotifiedOperator: [id number] DEPOLY JOSEPHINEMeter ID: [id number]   • Glucose 05/05/2022 234 (A) 70 - 130 mg/dL Final    RN NotifiedOperator: [id number] DEPOLY JOSEPHINEMeter ID: [id number]   • Glucose 05/05/2022 140 (A) 70 - 130 mg/dL Final    RN NotifiedOperator: [id number] NAZANIN DOTSONIMIEMeter ID: [id number]   • Glucose 05/06/2022 118 (A) 65 - 99 mg/dL Final   • BUN 05/06/2022 13  8 - 23 mg/dL Final   • Creatinine 05/06/2022 1.20  0.76 - 1.27 mg/dL Final   • Sodium 05/06/2022 140  136 - 145 mmol/L Final   • Potassium 05/06/2022 3.6  3.5 - 5.2 mmol/L Final   • Chloride 05/06/2022 107  98 - 107 mmol/L Final   • CO2 05/06/2022 22.0  22.0 - 29.0 mmol/L Final   • Calcium 05/06/2022 8.7  8.6 - 10.5 mg/dL Final   • Total Protein 05/06/2022 5.9 (A) 6.0 - 8.5 g/dL Final   • Albumin 05/06/2022 3.30 (A) 3.50 - 5.20 g/dL Final   • ALT (SGPT) 05/06/2022 9  1 - 41 U/L Final   • AST (SGOT) 05/06/2022 13  1 - 40 U/L Final   • Alkaline Phosphatase 05/06/2022 50  39 - 117 U/L Final   • Total Bilirubin 05/06/2022 0.9  0.0 - 1.2 mg/dL Final   • Globulin 05/06/2022 2.6  gm/dL Final   • A/G Ratio 05/06/2022 1.3  g/dL Final   • BUN/Creatinine Ratio 05/06/2022 10.8  7.0 - 25.0 Final   • Anion Gap 05/06/2022 11.0  5.0 - 15.0  mmol/L Final   • eGFR 05/06/2022 61.1  >60.0 mL/min/1.73 Final    National Kidney Foundation and American Society of Nephrology (ASN) Task Force recommended calculation based on the Chronic Kidney Disease Epidemiology Collaboration (CKD-EPI) equation refit without adjustment for race.   • WBC 05/06/2022 8.11  3.40 - 10.80 10*3/mm3 Final   • RBC 05/06/2022 4.78  4.14 - 5.80 10*6/mm3 Final   • Hemoglobin 05/06/2022 13.8  13.0 - 17.7 g/dL Final   • Hematocrit 05/06/2022 40.7  37.5 - 51.0 % Final   • MCV 05/06/2022 85.1  79.0 - 97.0 fL Final   • MCH 05/06/2022 28.9  26.6 - 33.0 pg Final   • MCHC 05/06/2022 33.9  31.5 - 35.7 g/dL Final   • RDW 05/06/2022 14.5  12.3 - 15.4 % Final   • RDW-SD 05/06/2022 44.6  37.0 - 54.0 fl Final   • MPV 05/06/2022 11.7  6.0 - 12.0 fL Final   • Platelets 05/06/2022 186  140 - 450 10*3/mm3 Final   • Neutrophil % 05/06/2022 58.0  42.7 - 76.0 % Final   • Lymphocyte % 05/06/2022 25.0  19.6 - 45.3 % Final   • Monocyte % 05/06/2022 13.1 (A) 5.0 - 12.0 % Final   • Eosinophil % 05/06/2022 3.1  0.3 - 6.2 % Final   • Basophil % 05/06/2022 0.6  0.0 - 1.5 % Final   • Immature Grans % 05/06/2022 0.2  0.0 - 0.5 % Final   • Neutrophils, Absolute 05/06/2022 4.70  1.70 - 7.00 10*3/mm3 Final   • Lymphocytes, Absolute 05/06/2022 2.03  0.70 - 3.10 10*3/mm3 Final   • Monocytes, Absolute 05/06/2022 1.06 (A) 0.10 - 0.90 10*3/mm3 Final   • Eosinophils, Absolute 05/06/2022 0.25  0.00 - 0.40 10*3/mm3 Final   • Basophils, Absolute 05/06/2022 0.05  0.00 - 0.20 10*3/mm3 Final   • Immature Grans, Absolute 05/06/2022 0.02  0.00 - 0.05 10*3/mm3 Final   • nRBC 05/06/2022 0.0  0.0 - 0.2 /100 WBC Final   • Glucose 05/06/2022 109  70 - 130 mg/dL Final    RN NotifiedOperator: [id number] NAZANIN BLANCHARDIEMeter ID: [id number]   Lab on 04/13/2022   Component Date Value Ref Range Status   • Glucose 04/13/2022 72  70 - 99 mg/dL Final   • BUN 04/13/2022 18  7 - 23 mg/dL Final   • Creatinine 04/13/2022 1.47 (A) 0.70 - 1.30 mg/dL Final    • Sodium 04/13/2022 137  137 - 145 mmol/L Final   • Potassium 04/13/2022 4.4  3.4 - 5.0 mmol/L Final   • Chloride 04/13/2022 103  101 - 112 mmol/L Final   • CO2 04/13/2022 24.0  22.0 - 30.0 mmol/L Final   • Calcium 04/13/2022 9.2  8.4 - 10.2 mg/dL Final   • BUN/Creatinine Ratio 04/13/2022 12.2  7.0 - 25.0 Final   • Anion Gap 04/13/2022 10.0  5.0 - 15.0 mmol/L Final   • eGFR 04/13/2022 47.9 (A) >60.0 mL/min/1.73 Final    National Kidney Foundation and American Society of Nephrology (ASN) Task Force recommended calculation based on the Chronic Kidney Disease Epidemiology Collaboration (CKD-EPI) equation refit without adjustment for race.   ]       .  .

## 2022-06-09 NOTE — PROGRESS NOTES
QUICK REFERENCE INFORMATION:  The ABCs of the Annual Wellness Visit    Subsequent Medicare Wellness Visit    HEALTH RISK ASSESSMENT    1940    Recent Hospitalizations:  Recently treated at the following:  Other: [facility].        Current Medical Providers:  Patient Care Team:  Serg Dale MD as PCP - General (Family Medicine)  Ashu Escamilla (Ophthalmology)  Birdie Spencer MD (Pulmonary Disease)        Smoking Status:  Social History     Tobacco Use   Smoking Status Never Smoker   Smokeless Tobacco Never Used       Alcohol Consumption:  Social History     Substance and Sexual Activity   Alcohol Use No       Depression Screen:   PHQ-2/PHQ-9 Depression Screening 6/10/2022   Retired PHQ-9 Total Score -   Retired Total Score -   Little Interest or Pleasure in Doing Things 0-->not at all   Feeling Down, Depressed or Hopeless 0-->not at all   PHQ-9: Brief Depression Severity Measure Score 0       Health Habits and Functional and Cognitive Screening:  Functional & Cognitive Status 6/10/2022   Do you have difficulty preparing food and eating? No   Do you have difficulty bathing yourself, getting dressed or grooming yourself? No   Do you have difficulty using the toilet? No   Do you have difficulty moving around from place to place? No   Do you have trouble with steps or getting out of a bed or a chair? No   Current Diet Well Balanced Diet   Dental Exam Up to date   Eye Exam Up to date   Exercise (times per week) 0 times per week   Current Exercises Include No Regular Exercise   Current Exercise Activities Include -   Do you need help using the phone?  No   Are you deaf or do you have serious difficulty hearing?  No   Do you need help with transportation? No   Do you need help shopping? No   Do you need help preparing meals?  No   Do you need help with housework?  No   Do you need help with laundry? No   Do you need help taking your medications? No   Do you need help managing money? No   Do you ever  drive or ride in a car without wearing a seat belt? No   Have you felt unusual stress, anger or loneliness in the last month? No   Who do you live with? Spouse   If you need help, do you have trouble finding someone available to you? No   Have you been bothered in the last four weeks by sexual problems? No   Do you have difficulty concentrating, remembering or making decisions? Yes           Does the patient have evidence of cognitive impairment? No    Asiprin use counseling: Taking ASA appropriately as indicated      Recent Lab Results:    Visual Acuity:  No exam data present    Age-appropriate Screening Schedule:  Refer to the list below for future screening recommendations based on patient's age, sex and/or medical conditions. Orders for these recommended tests are listed in the plan section. The patient has been provided with a written plan.    Health Maintenance   Topic Date Due   • TDAP/TD VACCINES (1 - Tdap) Never done   • ZOSTER VACCINE (2 of 3) 12/04/2015   • INFLUENZA VACCINE  08/01/2022   • DIABETIC EYE EXAM  10/21/2022   • HEMOGLOBIN A1C  11/30/2022   • PROSTATE CANCER SCREENING  05/31/2023   • LIPID PANEL  05/31/2023   • URINE MICROALBUMIN  05/31/2023        Subjective   History of Present Illness    Darwin Fraga is a 82 y.o. male who presents for an Annual Wellness Visit.    The following portions of the patient's history were reviewed and updated as appropriate: allergies, current medications, past family history, past medical history, past social history, past surgical history and problem list.    Outpatient Medications Prior to Visit   Medication Sig Dispense Refill   • aspirin 81 MG EC tablet Take 81 mg by mouth Daily.     • Blood Glucose Monitoring Suppl (True Metrix Meter) w/Device kit USE AS DIRECTED TO CHECK BLOOD SUGAR DAILY 1 kit 0   • Calcium Polycarbophil (FIBERCON PO) Take 2 tablets by mouth Daily.     • carvedilol (COREG) 6.25 MG tablet Take 6.25 mg by mouth 2 (Two) Times a Day With  Meals.     • clopidogrel (PLAVIX) 75 MG tablet Take 75 mg by mouth Daily.     • Docusate Sodium 100 MG capsule Take 100 mg by mouth Daily.     • Entresto 24-26 MG tablet TAKE 1 TABLET TWICE A  tablet 3   • folic acid (FOLVITE) 1 MG tablet TAKE 1 TABLET DAILY (Patient taking differently: Take 1 mg by mouth Daily.) 90 tablet 3   • glucose monitor monitoring kit 1 each Daily. 1 each 0   • levothyroxine (SYNTHROID, LEVOTHROID) 25 MCG tablet TAKE 1 TABLET DAILY (Patient taking differently: Take 25 mcg by mouth Daily.) 90 tablet 3   • Multiple Vitamins-Minerals (PRESERVISION AREDS 2+MULTI VIT) capsule Take 1 capsule by mouth 2 (Two) Times a Day.     • nitroglycerin (NITROSTAT) 0.3 MG SL tablet Place 1 tablet under the tongue Every 5 (Five) Minutes As Needed for Chest Pain. 20 tablet 3   • pantoprazole (PROTONIX) 40 MG EC tablet TAKE 1 TABLET DAILY (Patient taking differently: Take 40 mg by mouth Daily.) 90 tablet 3   • pravastatin (PRAVACHOL) 40 MG tablet TAKE 1 TABLET EVERY NIGHT (Patient taking differently: Take 40 mg by mouth Every Night.) 90 tablet 3   • Probiotic Product (PROBIOTIC DAILY PO) Take 1 capsule by mouth Daily.     • spironolactone (ALDACTONE) 12.5 MG tablet half tablet Take 12.5 mg by mouth Daily.     • tamsulosin (FLOMAX) 0.4 MG capsule 24 hr capsule TAKE 1 CAPSULE DAILY (Patient taking differently: Take 1 capsule by mouth Every Night.) 90 capsule 0   • True Metrix Blood Glucose Test test strip USE 1 STRIP AS DIRECTED AS DIRECTED TO CHECK BLOOD SUGAR ONCE DAILY 50 each 5   • TRUEplus Lancets 33G misc USE AS DIRECTED TO CHECK BLOOD SUGAR ONCE DAILY 100 each 3   • glimepiride (AMARYL) 4 MG tablet TAKE 1 TABLET TWICE A DAY (Patient taking differently: Take 4 mg by mouth 2 (Two) Times a Day.) 180 tablet 3   • Janumet  MG per tablet TAKE 1 TABLET TWICE A DAY (Patient taking differently: Take 1 tablet by mouth 2 (Two) Times a Day With Meals.) 180 tablet 3   • Dapagliflozin Propanediol (Farxiga) 10  MG tablet Take 10 mg by mouth Daily. 90 tablet 0   • benzonatate (TESSALON) 100 MG capsule Take 1 capsule by mouth 3 (Three) Times a Day As Needed for Cough. 30 capsule 0   • fluticasone (FLONASE) 50 MCG/ACT nasal spray 2 sprays into the nostril(s) as directed by provider Daily. Administer 2 sprays in each nostril for each dose. (Patient taking differently: 2 sprays into the nostril(s) as directed by provider Daily As Needed. Administer 2 sprays in each nostril for each dose.) 1 bottle 0   • oxybutynin XL (DITROPAN-XL) 10 MG 24 hr tablet Take 10 mg by mouth every night at bedtime.     • oxyCODONE-acetaminophen (PERCOCET) 7.5-325 MG per tablet Take 1-2 tablets by mouth Every 4 (Four) Hours As Needed (Pain). 10 tablet 0   • promethazine-dextromethorphan (PROMETHAZINE-DM) 6.25-15 MG/5ML syrup Take 5 mL by mouth At Night As Needed for Cough. 120 mL 0     No facility-administered medications prior to visit.       Patient Active Problem List   Diagnosis   • Type 2 diabetes mellitus with complication, without long-term current use of insulin ([facility])   • Primary osteoarthritis involving multiple joints   • Mixed hyperlipidemia   • Essential hypertension   • Benign non-nodular prostatic hyperplasia with lower urinary tract symptoms   • Acquired hypothyroidism   • Gastroesophageal reflux disease without esophagitis   • Coronary artery disease involving native coronary artery of native heart without angina pectoris   • Chronic renal impairment, stage 3a ([facility])   • Macular degeneration of both eyes   • No diabetic retinopathy in both eyes   • Erectile dysfunction associated with type 2 diabetes mellitus ([facility])   • Chest wall deformity   • Hypertensive heart disease with heart failure ([facility])   • Atrioventricular block, complete ([facility])   • ACS (acute coronary syndrome) ([facility])   • Acute bilateral obstructive uropathy   • Kidney stone   • Chronic systolic congestive heart failure ([facility])       Advance Care Planning:  ACP Discussion Status:  "ACP discussion was held with the patient during this visit. Patient has an advance directive (not in EMR), copy requested.      Identification of Risk Factors:  Risk factors include: weight  and cardiovascular risk.    Review of Systems    Compared to one year ago, the patient feels his physical health is worse.  Compared to one year ago, the patient feels his mental health is the same.    Objective     Physical Exam    Vitals:    06/10/22 0929   BP: 120/60   BP Location: Left arm   Patient Position: Sitting   Cuff Size: Large Adult   Pulse: 80   Temp: 98 °F (36.7 °C)   TempSrc: Temporal   SpO2: 98%   Weight: 83.9 kg (185 lb)   Height: 180.3 cm (71\")   PainSc: 0-No pain       Patient's Body mass index is 25.8 kg/m². BMI is above normal parameters. Recommendations include: educational material.      Assessment & Plan   Patient Self-Management and Personalized Health Advice  The patient has been provided with information about: diet, exercise and weight management and preventive services including:   · Exercise counseling provided, Fall Risk assessment done, Nutrition counseling provided, Prostate cancer screening discussed.    Visit Diagnoses:    ICD-10-CM ICD-9-CM   1. Medicare annual wellness visit, subsequent  Z00.00 V70.0   2. Type 2 diabetes mellitus with complication, without long-term current use of insulin ([facility])  E11.8 250.90   3. Essential hypertension  I10 401.9   4. Mixed hyperlipidemia  E78.2 272.2   5. Hypertensive heart disease with heart failure ([facility])  I11.0 402.91     428.9   6. Atrioventricular block, complete ([facility])  I44.2 426.0   7. Acquired hypothyroidism  E03.9 244.9   8. Chronic renal impairment, stage 3a ([facility])  N18.31 585.3   9. Gastroesophageal reflux disease without esophagitis  K21.9 530.81   10. Benign non-nodular prostatic hyperplasia with lower urinary tract symptoms  N40.1 600.91       Orders Placed This Encounter   Procedures   • Comprehensive Metabolic Panel     Standing Status:   " Future     Standing Expiration Date:   6/10/2023     Order Specific Question:   Release to patient     Answer:   Immediate   • T4, Free     Standing Status:   Future     Standing Expiration Date:   6/10/2023     Order Specific Question:   Release to patient     Answer:   Immediate   • TSH     Standing Status:   Future     Standing Expiration Date:   6/10/2023     Order Specific Question:   Release to patient     Answer:   Immediate   • Urinalysis With Culture If Indicated -     Standing Status:   Future     Standing Expiration Date:   6/10/2023     Order Specific Question:   Release to patient     Answer:   Immediate   • Hemoglobin A1c     Standing Status:   Future     Standing Expiration Date:   6/10/2023     Order Specific Question:   Release to patient     Answer:   Immediate   • LDL Cholesterol, Direct     Standing Status:   Future     Standing Expiration Date:   6/10/2023     Order Specific Question:   Release to patient     Answer:   Immediate   • CBC & Differential     Standing Status:   Future     Standing Expiration Date:   6/10/2023     Order Specific Question:   Manual Differential     Answer:   No       Outpatient Encounter Medications as of 6/10/2022   Medication Sig Dispense Refill   • aspirin 81 MG EC tablet Take 81 mg by mouth Daily.     • Blood Glucose Monitoring Suppl (True Metrix Meter) w/Device kit USE AS DIRECTED TO CHECK BLOOD SUGAR DAILY 1 kit 0   • Calcium Polycarbophil (FIBERCON PO) Take 2 tablets by mouth Daily.     • carvedilol (COREG) 6.25 MG tablet Take 6.25 mg by mouth 2 (Two) Times a Day With Meals.     • clopidogrel (PLAVIX) 75 MG tablet Take 75 mg by mouth Daily.     • Docusate Sodium 100 MG capsule Take 100 mg by mouth Daily.     • Entresto 24-26 MG tablet TAKE 1 TABLET TWICE A  tablet 3   • folic acid (FOLVITE) 1 MG tablet TAKE 1 TABLET DAILY (Patient taking differently: Take 1 mg by mouth Daily.) 90 tablet 3   • glucose monitor monitoring kit 1 each Daily. 1 each 0   •  levothyroxine (SYNTHROID, LEVOTHROID) 25 MCG tablet TAKE 1 TABLET DAILY (Patient taking differently: Take 25 mcg by mouth Daily.) 90 tablet 3   • Multiple Vitamins-Minerals (PRESERVISION AREDS 2+MULTI VIT) capsule Take 1 capsule by mouth 2 (Two) Times a Day.     • nitroglycerin (NITROSTAT) 0.3 MG SL tablet Place 1 tablet under the tongue Every 5 (Five) Minutes As Needed for Chest Pain. 20 tablet 3   • pantoprazole (PROTONIX) 40 MG EC tablet TAKE 1 TABLET DAILY (Patient taking differently: Take 40 mg by mouth Daily.) 90 tablet 3   • pravastatin (PRAVACHOL) 40 MG tablet TAKE 1 TABLET EVERY NIGHT (Patient taking differently: Take 40 mg by mouth Every Night.) 90 tablet 3   • Probiotic Product (PROBIOTIC DAILY PO) Take 1 capsule by mouth Daily.     • spironolactone (ALDACTONE) 12.5 MG tablet half tablet Take 12.5 mg by mouth Daily.     • tamsulosin (FLOMAX) 0.4 MG capsule 24 hr capsule TAKE 1 CAPSULE DAILY (Patient taking differently: Take 1 capsule by mouth Every Night.) 90 capsule 0   • True Metrix Blood Glucose Test test strip USE 1 STRIP AS DIRECTED AS DIRECTED TO CHECK BLOOD SUGAR ONCE DAILY 50 each 5   • TRUEplus Lancets 33G misc USE AS DIRECTED TO CHECK BLOOD SUGAR ONCE DAILY 100 each 3   • [DISCONTINUED] glimepiride (AMARYL) 4 MG tablet TAKE 1 TABLET TWICE A DAY (Patient taking differently: Take 4 mg by mouth 2 (Two) Times a Day.) 180 tablet 3   • [DISCONTINUED] Janumet  MG per tablet TAKE 1 TABLET TWICE A DAY (Patient taking differently: Take 1 tablet by mouth 2 (Two) Times a Day With Meals.) 180 tablet 3   • Dapagliflozin Propanediol (Farxiga) 10 MG tablet Take 10 mg by mouth Daily. 90 tablet 0   • metFORMIN ER (GLUCOPHAGE-XR) 500 MG 24 hr tablet Take 1 tablet by mouth 2 (Two) Times a Day. 180 tablet 1   • [DISCONTINUED] benzonatate (TESSALON) 100 MG capsule Take 1 capsule by mouth 3 (Three) Times a Day As Needed for Cough. 30 capsule 0   • [DISCONTINUED] fluticasone (FLONASE) 50 MCG/ACT nasal spray 2  sprays into the nostril(s) as directed by provider Daily. Administer 2 sprays in each nostril for each dose. (Patient taking differently: 2 sprays into the nostril(s) as directed by provider Daily As Needed. Administer 2 sprays in each nostril for each dose.) 1 bottle 0   • [DISCONTINUED] oxybutynin XL (DITROPAN-XL) 10 MG 24 hr tablet Take 10 mg by mouth every night at bedtime.     • [DISCONTINUED] oxyCODONE-acetaminophen (PERCOCET) 7.5-325 MG per tablet Take 1-2 tablets by mouth Every 4 (Four) Hours As Needed (Pain). 10 tablet 0   • [DISCONTINUED] promethazine-dextromethorphan (PROMETHAZINE-DM) 6.25-15 MG/5ML syrup Take 5 mL by mouth At Night As Needed for Cough. 120 mL 0     No facility-administered encounter medications on file as of 6/10/2022.       Reviewed use of high risk medication in the elderly: yes  Reviewed for potential of harmful drug interactions in the elderly: yes    Follow Up:  Return in about 6 months (around 12/10/2022) for Next scheduled follow up, Or sooner as needed With Labs prior to appointment.     An After Visit Summary and PPPS with all of these plans were given to the patient.         .

## 2022-06-10 ENCOUNTER — OFFICE VISIT (OUTPATIENT)
Dept: FAMILY MEDICINE CLINIC | Facility: CLINIC | Age: 82
End: 2022-06-10

## 2022-06-10 VITALS
SYSTOLIC BLOOD PRESSURE: 120 MMHG | BODY MASS INDEX: 25.9 KG/M2 | HEART RATE: 80 BPM | WEIGHT: 185 LBS | HEIGHT: 71 IN | TEMPERATURE: 98 F | OXYGEN SATURATION: 98 % | DIASTOLIC BLOOD PRESSURE: 60 MMHG

## 2022-06-10 DIAGNOSIS — I10 ESSENTIAL HYPERTENSION: Chronic | ICD-10-CM

## 2022-06-10 DIAGNOSIS — N18.31 CHRONIC RENAL IMPAIRMENT, STAGE 3A: Chronic | ICD-10-CM

## 2022-06-10 DIAGNOSIS — E78.2 MIXED HYPERLIPIDEMIA: Chronic | ICD-10-CM

## 2022-06-10 DIAGNOSIS — Z00.00 MEDICARE ANNUAL WELLNESS VISIT, SUBSEQUENT: Primary | ICD-10-CM

## 2022-06-10 DIAGNOSIS — N40.1 BENIGN NON-NODULAR PROSTATIC HYPERPLASIA WITH LOWER URINARY TRACT SYMPTOMS: Chronic | ICD-10-CM

## 2022-06-10 DIAGNOSIS — E03.9 ACQUIRED HYPOTHYROIDISM: Chronic | ICD-10-CM

## 2022-06-10 DIAGNOSIS — I11.0 HYPERTENSIVE HEART DISEASE WITH HEART FAILURE: Chronic | ICD-10-CM

## 2022-06-10 DIAGNOSIS — E11.8 TYPE 2 DIABETES MELLITUS WITH COMPLICATION, WITHOUT LONG-TERM CURRENT USE OF INSULIN: Chronic | ICD-10-CM

## 2022-06-10 DIAGNOSIS — K21.9 GASTROESOPHAGEAL REFLUX DISEASE WITHOUT ESOPHAGITIS: Chronic | ICD-10-CM

## 2022-06-10 DIAGNOSIS — I44.2 ATRIOVENTRICULAR BLOCK, COMPLETE: Chronic | ICD-10-CM

## 2022-06-10 PROCEDURE — 1159F MED LIST DOCD IN RCRD: CPT | Performed by: INTERNAL MEDICINE

## 2022-06-10 PROCEDURE — G0439 PPPS, SUBSEQ VISIT: HCPCS | Performed by: INTERNAL MEDICINE

## 2022-06-10 PROCEDURE — 1126F AMNT PAIN NOTED NONE PRSNT: CPT | Performed by: INTERNAL MEDICINE

## 2022-06-10 PROCEDURE — 1170F FXNL STATUS ASSESSED: CPT | Performed by: INTERNAL MEDICINE

## 2022-06-10 PROCEDURE — 99214 OFFICE O/P EST MOD 30 MIN: CPT | Performed by: INTERNAL MEDICINE

## 2022-06-10 RX ORDER — METFORMIN HYDROCHLORIDE 500 MG/1
500 TABLET, EXTENDED RELEASE ORAL 2 TIMES DAILY
Qty: 180 TABLET | Refills: 1 | Status: SHIPPED | OUTPATIENT
Start: 2022-06-10 | End: 2022-06-24 | Stop reason: ALTCHOICE

## 2022-06-16 ENCOUNTER — LAB (OUTPATIENT)
Dept: LAB | Facility: OTHER | Age: 82
End: 2022-06-16

## 2022-06-16 ENCOUNTER — TRANSCRIBE ORDERS (OUTPATIENT)
Dept: LAB | Facility: OTHER | Age: 82
End: 2022-06-16

## 2022-06-16 DIAGNOSIS — I50.22 CHRONIC SYSTOLIC HEART FAILURE: ICD-10-CM

## 2022-06-16 DIAGNOSIS — I25.5 ISCHEMIC CARDIOMYOPATHY: ICD-10-CM

## 2022-06-16 DIAGNOSIS — I50.22 CHRONIC SYSTOLIC HEART FAILURE: Primary | ICD-10-CM

## 2022-06-16 LAB
ALBUMIN SERPL-MCNC: 3.9 G/DL (ref 3.5–5)
ALBUMIN/GLOB SERPL: 1.6 G/DL (ref 1.1–1.8)
ALP SERPL-CCNC: 66 U/L (ref 38–126)
ALT SERPL W P-5'-P-CCNC: 16 U/L
ANION GAP SERPL CALCULATED.3IONS-SCNC: 8 MMOL/L (ref 5–15)
AST SERPL-CCNC: 19 U/L (ref 17–59)
BASOPHILS # BLD AUTO: 0.03 10*3/MM3 (ref 0–0.2)
BASOPHILS NFR BLD AUTO: 0.5 % (ref 0–1.5)
BILIRUB SERPL-MCNC: 0.7 MG/DL (ref 0.2–1.3)
BUN SERPL-MCNC: 22 MG/DL (ref 7–23)
BUN/CREAT SERPL: 15.6 (ref 7–25)
CALCIUM SPEC-SCNC: 9 MG/DL (ref 8.4–10.2)
CHLORIDE SERPL-SCNC: 104 MMOL/L (ref 101–112)
CO2 SERPL-SCNC: 26 MMOL/L (ref 22–30)
CREAT SERPL-MCNC: 1.41 MG/DL (ref 0.7–1.3)
DEPRECATED RDW RBC AUTO: 48.3 FL (ref 37–54)
EGFRCR SERPLBLD CKD-EPI 2021: 50.4 ML/MIN/1.73
EOSINOPHIL # BLD AUTO: 0.15 10*3/MM3 (ref 0–0.4)
EOSINOPHIL NFR BLD AUTO: 2.4 % (ref 0.3–6.2)
ERYTHROCYTE [DISTWIDTH] IN BLOOD BY AUTOMATED COUNT: 15.3 % (ref 12.3–15.4)
GLOBULIN UR ELPH-MCNC: 2.5 GM/DL (ref 2.3–3.5)
GLUCOSE SERPL-MCNC: 219 MG/DL (ref 70–99)
HCT VFR BLD AUTO: 42.7 % (ref 37.5–51)
HGB BLD-MCNC: 13.8 G/DL (ref 13–17.7)
LYMPHOCYTES # BLD AUTO: 1.39 10*3/MM3 (ref 0.7–3.1)
LYMPHOCYTES NFR BLD AUTO: 21.8 % (ref 19.6–45.3)
MCH RBC QN AUTO: 28 PG (ref 26.6–33)
MCHC RBC AUTO-ENTMCNC: 32.3 G/DL (ref 31.5–35.7)
MCV RBC AUTO: 86.6 FL (ref 79–97)
MONOCYTES # BLD AUTO: 0.73 10*3/MM3 (ref 0.1–0.9)
MONOCYTES NFR BLD AUTO: 11.5 % (ref 5–12)
NEUTROPHILS NFR BLD AUTO: 4.07 10*3/MM3 (ref 1.7–7)
NEUTROPHILS NFR BLD AUTO: 63.8 % (ref 42.7–76)
PLATELET # BLD AUTO: 186 10*3/MM3 (ref 140–450)
PMV BLD AUTO: 11.3 FL (ref 6–12)
POTASSIUM SERPL-SCNC: 4.4 MMOL/L (ref 3.4–5)
PROT SERPL-MCNC: 6.4 G/DL (ref 6.3–8.6)
RBC # BLD AUTO: 4.93 10*6/MM3 (ref 4.14–5.8)
SODIUM SERPL-SCNC: 138 MMOL/L (ref 137–145)
WBC NRBC COR # BLD: 6.37 10*3/MM3 (ref 3.4–10.8)

## 2022-06-16 PROCEDURE — 85025 COMPLETE CBC W/AUTO DIFF WBC: CPT | Performed by: INTERNAL MEDICINE

## 2022-06-16 PROCEDURE — 36415 COLL VENOUS BLD VENIPUNCTURE: CPT | Performed by: INTERNAL MEDICINE

## 2022-06-16 PROCEDURE — 80053 COMPREHEN METABOLIC PANEL: CPT | Performed by: INTERNAL MEDICINE

## 2022-06-23 DIAGNOSIS — E11.8 TYPE 2 DIABETES MELLITUS WITH COMPLICATION, WITHOUT LONG-TERM CURRENT USE OF INSULIN: ICD-10-CM

## 2022-06-23 RX ORDER — DAPAGLIFLOZIN 10 MG/1
TABLET, FILM COATED ORAL
Qty: 90 TABLET | Refills: 3 | Status: SHIPPED | OUTPATIENT
Start: 2022-06-23

## 2022-06-24 ENCOUNTER — TELEPHONE (OUTPATIENT)
Dept: FAMILY MEDICINE CLINIC | Facility: CLINIC | Age: 82
End: 2022-06-24

## 2022-06-24 DIAGNOSIS — E11.8 TYPE 2 DIABETES MELLITUS WITH COMPLICATION, WITHOUT LONG-TERM CURRENT USE OF INSULIN: Chronic | ICD-10-CM

## 2022-06-24 RX ORDER — SITAGLIPTIN AND METFORMIN HYDROCHLORIDE 500; 50 MG/1; MG/1
1 TABLET, FILM COATED ORAL 2 TIMES DAILY WITH MEALS
Qty: 60 TABLET | Refills: 5
Start: 2022-06-24

## 2022-06-24 NOTE — TELEPHONE ENCOUNTER
Continue with current dose of Farxiga.  If he still has Janumet, he can restart Janumet 1 tablet twice daily and stop taking the separate metformin tablet which I started at his recent office visit.  We will see how this does for his blood sugar for a couple of weeks and then make further adjustments as needed.

## 2022-06-24 NOTE — TELEPHONE ENCOUNTER
I read Dr Dale's message to the patient. The patient voiced understanding, had no additional questions, and thanked me for the call.

## 2022-06-24 NOTE — TELEPHONE ENCOUNTER
Metformin 500 mg BID and Farxiga was started last week and fasting sugar level is at 219. Darwin advised glimepiride and Janumet was discontinued.   Darwin is asking if his medication needs to change or give it some more time?

## 2022-06-29 ENCOUNTER — TRANSCRIBE ORDERS (OUTPATIENT)
Dept: GENERAL RADIOLOGY | Facility: CLINIC | Age: 82
End: 2022-06-29

## 2022-06-29 DIAGNOSIS — I25.5 ISCHEMIC CARDIOMYOPATHY: ICD-10-CM

## 2022-06-29 DIAGNOSIS — I10 ESSENTIAL HYPERTENSION: ICD-10-CM

## 2022-06-29 DIAGNOSIS — I50.22 CHRONIC SYSTOLIC HEART FAILURE: Primary | ICD-10-CM

## 2022-07-07 ENCOUNTER — TELEPHONE (OUTPATIENT)
Dept: FAMILY MEDICINE CLINIC | Facility: CLINIC | Age: 82
End: 2022-07-07

## 2022-07-07 ENCOUNTER — LAB (OUTPATIENT)
Dept: LAB | Facility: OTHER | Age: 82
End: 2022-07-07

## 2022-07-07 DIAGNOSIS — I10 ESSENTIAL HYPERTENSION: ICD-10-CM

## 2022-07-07 DIAGNOSIS — I50.22 CHRONIC SYSTOLIC HEART FAILURE: ICD-10-CM

## 2022-07-07 DIAGNOSIS — I25.5 ISCHEMIC CARDIOMYOPATHY: ICD-10-CM

## 2022-07-07 LAB
ANION GAP SERPL CALCULATED.3IONS-SCNC: 8 MMOL/L (ref 5–15)
BUN SERPL-MCNC: 16 MG/DL (ref 7–23)
BUN/CREAT SERPL: 11.9 (ref 7–25)
CALCIUM SPEC-SCNC: 9 MG/DL (ref 8.4–10.2)
CHLORIDE SERPL-SCNC: 106 MMOL/L (ref 101–112)
CO2 SERPL-SCNC: 24 MMOL/L (ref 22–30)
CREAT SERPL-MCNC: 1.35 MG/DL (ref 0.7–1.3)
EGFRCR SERPLBLD CKD-EPI 2021: 53.1 ML/MIN/1.73
GLUCOSE SERPL-MCNC: 168 MG/DL (ref 70–99)
POTASSIUM SERPL-SCNC: 4.4 MMOL/L (ref 3.4–5)
SODIUM SERPL-SCNC: 138 MMOL/L (ref 137–145)

## 2022-07-07 PROCEDURE — 36415 COLL VENOUS BLD VENIPUNCTURE: CPT | Performed by: INTERNAL MEDICINE

## 2022-07-07 PROCEDURE — 80048 BASIC METABOLIC PNL TOTAL CA: CPT | Performed by: INTERNAL MEDICINE

## 2022-07-07 NOTE — TELEPHONE ENCOUNTER
I contacted the patient and informed him of the message. I advised to contact the office if there is no improvement. He stated understanding and thanked me.

## 2022-07-07 NOTE — TELEPHONE ENCOUNTER
The patient came to the office to speak about his recent glucose levels. He is still on current doses of Farxiga and Janumet with glucose levels still in the 230s in the mornings. He stated he took Amaryl 4 mg once in the morning and once at night to try to decrease his glucose. After that he woke up to with his glucose at 65.

## 2022-07-07 NOTE — TELEPHONE ENCOUNTER
Continue with the current doses of Janumet and Farxiga.  The 4 mg dose of Amaryl was too strong for him, and caused his glucose to go too low.  He may try taking Amaryl 2 mg each morning to see if this helps improve his blood sugar control.  He may break the 4 mg tablet in half to obtain the 2 mg dose.

## 2022-10-17 DIAGNOSIS — E78.2 MIXED HYPERLIPIDEMIA: Chronic | ICD-10-CM

## 2022-10-17 RX ORDER — PRAVASTATIN SODIUM 40 MG
TABLET ORAL
Qty: 90 TABLET | Refills: 3 | OUTPATIENT
Start: 2022-10-17

## (undated) DEVICE — GW PTFE FIX/CORE FLXTIP .038 3X150CM

## (undated) DEVICE — GLV SURG NEOLON 2G PF LF 7.5 STRL

## (undated) DEVICE — BALLOON DILATATION CATHETER KIT: Brand: UROMAX ULTRA KIT

## (undated) DEVICE — ADAPT/Y SCPE GATEWAY ADVNTGE

## (undated) DEVICE — PK CYSTO LF 60

## (undated) DEVICE — SOL IRR H2O BTL 1000ML STRL

## (undated) DEVICE — SOL IRRG H2O PL/BG 1000ML STRL

## (undated) DEVICE — BASKT HELICAL GEMENI 4WIRE FILIFORM 4/5F 120CM 14MM

## (undated) DEVICE — SYR LL 3CC

## (undated) DEVICE — CATH URETRL OPN/END 5F70CM

## (undated) DEVICE — GLV SURG NEOLON 2G PF LF 6.5 STRL

## (undated) DEVICE — SOL PVPI SPRY BETADINE 3OZ

## (undated) DEVICE — SYS IRR PUMP SGL ACTN VAC SYR 10CC

## (undated) DEVICE — CONTAINER,SPECIMEN,OR STERILE,4OZ: Brand: MEDLINE

## (undated) DEVICE — SOL IRR NACL 0.9PCT 3000ML